# Patient Record
Sex: FEMALE | Race: WHITE | NOT HISPANIC OR LATINO | Employment: OTHER | URBAN - METROPOLITAN AREA
[De-identification: names, ages, dates, MRNs, and addresses within clinical notes are randomized per-mention and may not be internally consistent; named-entity substitution may affect disease eponyms.]

---

## 2023-03-20 ENCOUNTER — DOCUMENTATION (OUTPATIENT)
Dept: PRIMARY CARE | Facility: CLINIC | Age: 83
End: 2023-03-20
Payer: MEDICARE

## 2023-03-22 ENCOUNTER — PATIENT OUTREACH (OUTPATIENT)
Dept: PRIMARY CARE | Facility: CLINIC | Age: 83
End: 2023-03-22
Payer: MEDICARE

## 2023-04-06 ENCOUNTER — OFFICE VISIT (OUTPATIENT)
Dept: PRIMARY CARE | Facility: CLINIC | Age: 83
End: 2023-04-06
Payer: MEDICARE

## 2023-04-06 VITALS
SYSTOLIC BLOOD PRESSURE: 127 MMHG | HEIGHT: 60 IN | DIASTOLIC BLOOD PRESSURE: 55 MMHG | BODY MASS INDEX: 37.3 KG/M2 | TEMPERATURE: 97.4 F | WEIGHT: 190 LBS

## 2023-04-06 DIAGNOSIS — I48.0 PAROXYSMAL ATRIAL FIBRILLATION (MULTI): Primary | ICD-10-CM

## 2023-04-06 DIAGNOSIS — E66.01 OBESITY, MORBID (MULTI): ICD-10-CM

## 2023-04-06 DIAGNOSIS — R45.89 DEPRESSED MOOD: ICD-10-CM

## 2023-04-06 DIAGNOSIS — I49.5 SA NODE DYSFUNCTION (MULTI): ICD-10-CM

## 2023-04-06 DIAGNOSIS — G95.89 LUMBAR EPIDURAL MASS (MULTI): ICD-10-CM

## 2023-04-06 PROBLEM — H35.30 AMD (AGE RELATED MACULAR DEGENERATION): Status: ACTIVE | Noted: 2023-04-06

## 2023-04-06 PROBLEM — M31.6 TEMPORAL ARTERITIS (MULTI): Status: RESOLVED | Noted: 2023-04-06 | Resolved: 2023-04-06

## 2023-04-06 PROBLEM — I10 HYPERTENSION: Status: ACTIVE | Noted: 2023-04-06

## 2023-04-06 PROBLEM — R92.8 ABNORMAL FINDING ON MAMMOGRAPHY: Status: ACTIVE | Noted: 2023-04-06

## 2023-04-06 PROBLEM — M31.6 TEMPORAL ARTERITIS (MULTI): Status: ACTIVE | Noted: 2023-04-06

## 2023-04-06 PROBLEM — G25.81 RESTLESS LEGS SYNDROME: Status: ACTIVE | Noted: 2023-04-06

## 2023-04-06 PROBLEM — I65.23 ASYMPTOMATIC STENOSIS OF BOTH CAROTID ARTERIES WITHOUT INFARCTION: Status: ACTIVE | Noted: 2023-04-06

## 2023-04-06 PROBLEM — H52.02 HYPERMETROPIA OF LEFT EYE: Status: ACTIVE | Noted: 2023-04-06

## 2023-04-06 PROBLEM — H52.223 MYOPIA OF BOTH EYES WITH REGULAR ASTIGMATISM: Status: ACTIVE | Noted: 2023-04-06

## 2023-04-06 PROBLEM — Z95.0 S/P PLACEMENT OF CARDIAC PACEMAKER: Status: ACTIVE | Noted: 2023-04-06

## 2023-04-06 PROBLEM — N95.2 VAGINAL ATROPHY: Status: ACTIVE | Noted: 2023-04-06

## 2023-04-06 PROBLEM — R01.1 SYSTOLIC MURMUR: Status: ACTIVE | Noted: 2023-04-06

## 2023-04-06 PROBLEM — E78.5 HYPERLIPIDEMIA: Status: ACTIVE | Noted: 2023-04-06

## 2023-04-06 PROBLEM — M54.9 BACK PAIN, CHRONIC: Status: ACTIVE | Noted: 2023-04-06

## 2023-04-06 PROBLEM — G47.33 OBSTRUCTIVE SLEEP APNEA: Status: ACTIVE | Noted: 2023-04-06

## 2023-04-06 PROBLEM — M54.16 LUMBAR RADICULOPATHY: Status: ACTIVE | Noted: 2023-04-06

## 2023-04-06 PROBLEM — E11.22 CHRONIC KIDNEY DISEASE DUE TO DIABETES MELLITUS (MULTI): Status: ACTIVE | Noted: 2023-04-06

## 2023-04-06 PROBLEM — R09.89 LEFT CAROTID BRUIT: Status: ACTIVE | Noted: 2023-04-06

## 2023-04-06 PROBLEM — I44.2 THIRD DEGREE AV BLOCK (MULTI): Status: ACTIVE | Noted: 2023-04-06

## 2023-04-06 PROBLEM — E55.9 VITAMIN D DEFICIENCY: Status: ACTIVE | Noted: 2023-04-06

## 2023-04-06 PROBLEM — M25.559 HIP PAIN: Status: ACTIVE | Noted: 2023-04-06

## 2023-04-06 PROBLEM — Z96.1 BILATERAL PSEUDOPHAKIA: Status: ACTIVE | Noted: 2023-04-06

## 2023-04-06 PROBLEM — Z95.0 PRESENCE OF CARDIAC PACEMAKER: Status: ACTIVE | Noted: 2023-04-06

## 2023-04-06 PROBLEM — M17.0 ARTHRITIS OF BOTH KNEES: Status: ACTIVE | Noted: 2023-04-06

## 2023-04-06 PROBLEM — I27.20 PULMONARY HYPERTENSION (MULTI): Status: ACTIVE | Noted: 2023-04-06

## 2023-04-06 PROBLEM — M35.3 POLYMYALGIA RHEUMATICA (MULTI): Status: RESOLVED | Noted: 2023-04-06 | Resolved: 2023-04-06

## 2023-04-06 PROBLEM — E11.9 NON-INSULIN DEPENDENT TYPE 2 DIABETES MELLITUS (MULTI): Status: ACTIVE | Noted: 2023-04-06

## 2023-04-06 PROBLEM — M72.0 DUPUYTREN'S CONTRACTURE: Status: ACTIVE | Noted: 2023-04-06

## 2023-04-06 PROBLEM — R20.0 NUMBNESS IN FEET: Status: ACTIVE | Noted: 2023-04-06

## 2023-04-06 PROBLEM — E11.59 TYPE 2 DIABETES MELLITUS WITH OTHER CIRCULATORY COMPLICATIONS (MULTI): Status: ACTIVE | Noted: 2023-04-06

## 2023-04-06 PROBLEM — M35.3 POLYMYALGIA RHEUMATICA (MULTI): Status: ACTIVE | Noted: 2023-04-06

## 2023-04-06 PROBLEM — H52.13 MYOPIA OF BOTH EYES WITH REGULAR ASTIGMATISM: Status: ACTIVE | Noted: 2023-04-06

## 2023-04-06 PROBLEM — R60.0 BILATERAL LOWER EXTREMITY EDEMA: Status: ACTIVE | Noted: 2023-04-06

## 2023-04-06 PROBLEM — M65.341 TRIGGER RING FINGER OF RIGHT HAND: Status: ACTIVE | Noted: 2023-04-06

## 2023-04-06 PROBLEM — M81.0 OSTEOPOROSIS: Status: ACTIVE | Noted: 2023-04-06

## 2023-04-06 PROBLEM — J45.30 ASTHMA, MILD PERSISTENT (HHS-HCC): Status: ACTIVE | Noted: 2023-04-06

## 2023-04-06 PROBLEM — M65.30 TRIGGER FINGER: Status: ACTIVE | Noted: 2023-04-06

## 2023-04-06 PROBLEM — H43.813 PVD (POSTERIOR VITREOUS DETACHMENT), BOTH EYES: Status: ACTIVE | Noted: 2023-04-06

## 2023-04-06 PROBLEM — H26.492 PCO (POSTERIOR CAPSULAR OPACIFICATION), LEFT: Status: ACTIVE | Noted: 2023-04-06

## 2023-04-06 PROBLEM — R73.9 HYPERGLYCEMIA: Status: ACTIVE | Noted: 2023-04-06

## 2023-04-06 PROBLEM — G89.29 BACK PAIN, CHRONIC: Status: ACTIVE | Noted: 2023-04-06

## 2023-04-06 PROBLEM — H10.10 ALLERGIC CONJUNCTIVITIS: Status: ACTIVE | Noted: 2023-04-06

## 2023-04-06 PROBLEM — I49.9 CARDIAC ARRHYTHMIA: Status: ACTIVE | Noted: 2023-04-06

## 2023-04-06 PROBLEM — H43.819 POSTERIOR VITREOUS DETACHMENT: Status: ACTIVE | Noted: 2023-04-06

## 2023-04-06 PROBLEM — K21.9 GERD (GASTROESOPHAGEAL REFLUX DISEASE): Status: ACTIVE | Noted: 2023-04-06

## 2023-04-06 PROBLEM — I50.32 CHRONIC DIASTOLIC CONGESTIVE HEART FAILURE (MULTI): Status: ACTIVE | Noted: 2023-04-06

## 2023-04-06 PROBLEM — M25.561 BILATERAL KNEE PAIN: Status: ACTIVE | Noted: 2023-04-06

## 2023-04-06 PROBLEM — H35.54 RPE (RETINAL PIGMENT EPITHELIUM) HYPERTROPHY: Status: ACTIVE | Noted: 2023-04-06

## 2023-04-06 PROBLEM — M25.562 BILATERAL KNEE PAIN: Status: ACTIVE | Noted: 2023-04-06

## 2023-04-06 PROCEDURE — 1036F TOBACCO NON-USER: CPT | Performed by: STUDENT IN AN ORGANIZED HEALTH CARE EDUCATION/TRAINING PROGRAM

## 2023-04-06 PROCEDURE — 3078F DIAST BP <80 MM HG: CPT | Performed by: STUDENT IN AN ORGANIZED HEALTH CARE EDUCATION/TRAINING PROGRAM

## 2023-04-06 PROCEDURE — 1159F MED LIST DOCD IN RCRD: CPT | Performed by: STUDENT IN AN ORGANIZED HEALTH CARE EDUCATION/TRAINING PROGRAM

## 2023-04-06 PROCEDURE — 3074F SYST BP LT 130 MM HG: CPT | Performed by: STUDENT IN AN ORGANIZED HEALTH CARE EDUCATION/TRAINING PROGRAM

## 2023-04-06 PROCEDURE — 1160F RVW MEDS BY RX/DR IN RCRD: CPT | Performed by: STUDENT IN AN ORGANIZED HEALTH CARE EDUCATION/TRAINING PROGRAM

## 2023-04-06 PROCEDURE — 99214 OFFICE O/P EST MOD 30 MIN: CPT | Performed by: STUDENT IN AN ORGANIZED HEALTH CARE EDUCATION/TRAINING PROGRAM

## 2023-04-06 RX ORDER — DILTIAZEM HYDROCHLORIDE 360 MG/1
1 CAPSULE, EXTENDED RELEASE ORAL DAILY
COMMUNITY
Start: 2012-11-10 | End: 2023-07-13 | Stop reason: ALTCHOICE

## 2023-04-06 RX ORDER — HYDROGEN PEROXIDE 3 %
1 SOLUTION, NON-ORAL MISCELLANEOUS DAILY
COMMUNITY

## 2023-04-06 RX ORDER — LOSARTAN POTASSIUM 100 MG/1
1 TABLET ORAL DAILY
COMMUNITY
Start: 2018-04-03 | End: 2024-06-04

## 2023-04-06 RX ORDER — FUROSEMIDE 40 MG/1
1 TABLET ORAL DAILY
COMMUNITY
Start: 2018-04-03

## 2023-04-06 RX ORDER — CLOPIDOGREL BISULFATE 75 MG/1
1 TABLET ORAL DAILY
COMMUNITY
Start: 2016-12-13 | End: 2023-07-13 | Stop reason: ALTCHOICE

## 2023-04-06 RX ORDER — ACETAMINOPHEN 500 MG
1 TABLET ORAL DAILY
COMMUNITY
Start: 2022-03-29

## 2023-04-06 RX ORDER — NEOMYCIN/POLYMYXIN B/PRAMOXINE 3.5-10K-1
CREAM (GRAM) TOPICAL
COMMUNITY
Start: 2022-03-29

## 2023-04-06 RX ORDER — DENOSUMAB 60 MG/ML
INJECTION SUBCUTANEOUS
COMMUNITY
Start: 2021-12-10 | End: 2024-03-18 | Stop reason: SDUPTHER

## 2023-04-06 RX ORDER — FLECAINIDE ACETATE 50 MG/1
1 TABLET ORAL 2 TIMES DAILY
COMMUNITY
Start: 2023-03-21 | End: 2024-04-08 | Stop reason: SDUPTHER

## 2023-04-06 RX ORDER — METOPROLOL TARTRATE 25 MG/1
1 TABLET, FILM COATED ORAL 2 TIMES DAILY
COMMUNITY
Start: 2023-03-18 | End: 2024-04-08 | Stop reason: SDUPTHER

## 2023-04-06 RX ORDER — ROSUVASTATIN CALCIUM 40 MG/1
1 TABLET, COATED ORAL DAILY
COMMUNITY
Start: 2016-09-30 | End: 2023-11-27 | Stop reason: SDUPTHER

## 2023-04-06 ASSESSMENT — PATIENT HEALTH QUESTIONNAIRE - PHQ9
SUM OF ALL RESPONSES TO PHQ9 QUESTIONS 1 AND 2: 0
2. FEELING DOWN, DEPRESSED OR HOPELESS: NOT AT ALL
1. LITTLE INTEREST OR PLEASURE IN DOING THINGS: NOT AT ALL

## 2023-04-06 ASSESSMENT — ENCOUNTER SYMPTOMS
OCCASIONAL FEELINGS OF UNSTEADINESS: 0
DEPRESSION: 0
LOSS OF SENSATION IN FEET: 0

## 2023-04-06 ASSESSMENT — COLUMBIA-SUICIDE SEVERITY RATING SCALE - C-SSRS
2. HAVE YOU ACTUALLY HAD ANY THOUGHTS OF KILLING YOURSELF?: NO
1. IN THE PAST MONTH, HAVE YOU WISHED YOU WERE DEAD OR WISHED YOU COULD GO TO SLEEP AND NOT WAKE UP?: NO
6. HAVE YOU EVER DONE ANYTHING, STARTED TO DO ANYTHING, OR PREPARED TO DO ANYTHING TO END YOUR LIFE?: NO

## 2023-04-06 NOTE — PROGRESS NOTES
"82-year-old female presenting for hospital follow-up.  Admitted March 17, discharged March 18.    Hospital course:    \"DANETTE COHEN is a 82 year old Female with a past medical history of HTN,   GERD, chronic low back pain,T2DM (last HgA1c 6.0%), asthma, CKD stage 3b,   HFpEF, CHB s/p PPM placement, osteoporosis, ALEXANDER on CPAP at home, and   hyperlipidemia.   According to the patient she was called by the device clinic last Monday seen   there were readings on her device that needed to be looked at(episodes of   atrial fibrillation).  Dr. Garcia then called the patient on Wednesday scheduled   an appointment with him for next Tuesday.  Pt c/o weakness for the last week.   Her  was taking her pulse ox and noticed her heart rate going from very   high to very low at times so that combined with the weakness he decided to come   to the emergency room.  Her pacemaker was interrogated in the ER it is a [employer] it showed an AT/AF burden of 2%; no device malfunction was noted per   report and device rep.   Labs obtained on admit notable for elevated , mild troponin elevation of   21, Cr 1.37 and GFR 38. Renal function as expected given patient history of CKD   stage 3. CXR obtained on admit was negative. No medications were administered   to patient while she was in the ED. EKG obtained while in ED showed A-fib with   RVR with occasional paced beats.       New Onset Atrial Fibrillation   - started on heparin gtt.   - evaluated by cardiology who initiated metoprolol BID and transitioned to PO   Eliquis.   - ppm device report shows 2% burden; no device malfunction reported   - patient has scheduled follow up with EP Dr. White on 3/21       She was cleared by cardiology for discharge; The patient is medically stable   for discharge home. \"    Patient feeling well, following with cardiology in the outpatient.  Adjusting well to recent med changes, denies any side effects.    Does admit to some " depressed mood, has been going on long-term.  Several years ago was on Zoloft, wanted to discontinue, feels better off of it.  Has done counseling in the past, did not find it beneficial, does not want to pursue at this time.  Believes exercise would help, and wants to increase her home exercise.  Has good support system.    12 point ROS reviewed and negative other than as stated in HPI    General: Alert, oriented, pleasant, in no acute distress  HEENT:      Head: normocephalic, atraumatic;      eyes: EOMI, no scleral icterus;   CV: Heart with regular rate and rhythm, normal S1/S2, no murmurs  Lungs: CTAB without wheezing, rhonchi or rales; good respiratory effort, no increased work of breathing  Extremities: Trace edema bilaterally no cyanosis  Neuro: Cranial nerves grossly intact; alert and oriented  Psych: Appropriate mood and affect    1.  A-fib 2. SA node dysfunction  -Sinus rhythm on auscultation, feels well, stable  -Continue metoprolol tartrate 25 mg BID, flecainide- 50 mg BID, Eliquis 2.5 mg BID  -Continue to follow with cardiology    3.  Depressed mood  - Patient previously on Zoloft, does not wish to return to medication, does not wish counseling at this time  - Acknowledges the increased exercise would be beneficial    4.  Lumbar epidural mass  - Chart review shows lipomatous mass, being followed by pain management    Follow-up Medicare July as scheduled    Christopher D'Amico, DO

## 2023-04-10 ENCOUNTER — PATIENT OUTREACH (OUTPATIENT)
Dept: PRIMARY CARE | Facility: CLINIC | Age: 83
End: 2023-04-10
Payer: MEDICARE

## 2023-04-10 NOTE — PROGRESS NOTES
Spoke with renea.  States doing good.  Appt went well last week.  No questions or concerns.  Has another PCP appt in a few months.  Seeing cardiology tomorrow.  Does not want another follow up call this month.  Aware to call MD office with questions or concerns.

## 2023-05-02 ENCOUNTER — APPOINTMENT (OUTPATIENT)
Dept: PRIMARY CARE | Facility: CLINIC | Age: 83
End: 2023-05-02
Payer: MEDICARE

## 2023-07-12 PROBLEM — M19.91 LOCALIZED, PRIMARY OSTEOARTHRITIS: Status: ACTIVE | Noted: 2018-06-04

## 2023-07-12 PROBLEM — J45.909 ASTHMA (HHS-HCC): Status: ACTIVE | Noted: 2023-07-12

## 2023-07-12 PROBLEM — R06.09 DYSPNEA ON EXERTION: Status: ACTIVE | Noted: 2023-07-12

## 2023-07-12 RX ORDER — ASPIRIN 81 MG/1
1 TABLET ORAL DAILY
COMMUNITY
Start: 2023-04-11

## 2023-07-12 RX ORDER — DICLOFENAC SODIUM 10 MG/G
GEL TOPICAL
COMMUNITY
Start: 2018-06-04

## 2023-07-12 RX ORDER — GABAPENTIN 300 MG/1
CAPSULE ORAL
COMMUNITY
Start: 2022-10-03 | End: 2023-07-13 | Stop reason: ALTCHOICE

## 2023-07-12 RX ORDER — ALBUTEROL SULFATE 90 UG/1
AEROSOL, METERED RESPIRATORY (INHALATION) 4 TIMES DAILY PRN
COMMUNITY
End: 2023-11-28 | Stop reason: ALTCHOICE

## 2023-07-13 ENCOUNTER — OFFICE VISIT (OUTPATIENT)
Dept: PRIMARY CARE | Facility: CLINIC | Age: 83
End: 2023-07-13
Payer: MEDICARE

## 2023-07-13 VITALS
OXYGEN SATURATION: 96 % | WEIGHT: 193 LBS | HEART RATE: 76 BPM | DIASTOLIC BLOOD PRESSURE: 70 MMHG | BODY MASS INDEX: 37.89 KG/M2 | HEIGHT: 60 IN | SYSTOLIC BLOOD PRESSURE: 144 MMHG

## 2023-07-13 DIAGNOSIS — M72.0 DUPUYTREN'S CONTRACTURE: ICD-10-CM

## 2023-07-13 DIAGNOSIS — M35.3 POLYMYALGIA RHEUMATICA (MULTI): ICD-10-CM

## 2023-07-13 DIAGNOSIS — E11.59 TYPE 2 DIABETES MELLITUS WITH OTHER CIRCULATORY COMPLICATIONS (MULTI): Primary | ICD-10-CM

## 2023-07-13 DIAGNOSIS — Z00.00 MEDICARE ANNUAL WELLNESS VISIT, SUBSEQUENT: ICD-10-CM

## 2023-07-13 DIAGNOSIS — M81.0 OSTEOPOROSIS, UNSPECIFIED OSTEOPOROSIS TYPE, UNSPECIFIED PATHOLOGICAL FRACTURE PRESENCE: ICD-10-CM

## 2023-07-13 DIAGNOSIS — Z00.00 WELLNESS EXAMINATION: ICD-10-CM

## 2023-07-13 DIAGNOSIS — M54.9 CHRONIC BACK PAIN, UNSPECIFIED BACK LOCATION, UNSPECIFIED BACK PAIN LATERALITY: ICD-10-CM

## 2023-07-13 DIAGNOSIS — E78.5 HYPERLIPIDEMIA, UNSPECIFIED HYPERLIPIDEMIA TYPE: ICD-10-CM

## 2023-07-13 DIAGNOSIS — Z78.0 ASYMPTOMATIC POSTMENOPAUSAL STATE: ICD-10-CM

## 2023-07-13 DIAGNOSIS — I48.0 PAROXYSMAL ATRIAL FIBRILLATION (MULTI): ICD-10-CM

## 2023-07-13 DIAGNOSIS — J45.909 ASTHMA, UNSPECIFIED ASTHMA SEVERITY, UNSPECIFIED WHETHER COMPLICATED, UNSPECIFIED WHETHER PERSISTENT (HHS-HCC): ICD-10-CM

## 2023-07-13 DIAGNOSIS — I50.32 CHRONIC DIASTOLIC CONGESTIVE HEART FAILURE (MULTI): ICD-10-CM

## 2023-07-13 DIAGNOSIS — G89.29 CHRONIC BACK PAIN, UNSPECIFIED BACK LOCATION, UNSPECIFIED BACK PAIN LATERALITY: ICD-10-CM

## 2023-07-13 DIAGNOSIS — E55.9 VITAMIN D DEFICIENCY: ICD-10-CM

## 2023-07-13 DIAGNOSIS — E11.22 CHRONIC KIDNEY DISEASE DUE TO DIABETES MELLITUS (MULTI): ICD-10-CM

## 2023-07-13 DIAGNOSIS — I49.5 SINOATRIAL NODE DYSFUNCTION (MULTI): ICD-10-CM

## 2023-07-13 PROBLEM — M48.062 LUMBAR STENOSIS WITH NEUROGENIC CLAUDICATION: Status: ACTIVE | Noted: 2023-07-13

## 2023-07-13 PROBLEM — M31.6 TEMPORAL ARTERITIS (MULTI): Status: ACTIVE | Noted: 2023-07-13

## 2023-07-13 PROCEDURE — 1126F AMNT PAIN NOTED NONE PRSNT: CPT | Performed by: STUDENT IN AN ORGANIZED HEALTH CARE EDUCATION/TRAINING PROGRAM

## 2023-07-13 PROCEDURE — 99397 PER PM REEVAL EST PAT 65+ YR: CPT | Performed by: STUDENT IN AN ORGANIZED HEALTH CARE EDUCATION/TRAINING PROGRAM

## 2023-07-13 PROCEDURE — 1170F FXNL STATUS ASSESSED: CPT | Performed by: STUDENT IN AN ORGANIZED HEALTH CARE EDUCATION/TRAINING PROGRAM

## 2023-07-13 PROCEDURE — 99214 OFFICE O/P EST MOD 30 MIN: CPT | Performed by: STUDENT IN AN ORGANIZED HEALTH CARE EDUCATION/TRAINING PROGRAM

## 2023-07-13 PROCEDURE — G0439 PPPS, SUBSEQ VISIT: HCPCS | Performed by: STUDENT IN AN ORGANIZED HEALTH CARE EDUCATION/TRAINING PROGRAM

## 2023-07-13 PROCEDURE — 1036F TOBACCO NON-USER: CPT | Performed by: STUDENT IN AN ORGANIZED HEALTH CARE EDUCATION/TRAINING PROGRAM

## 2023-07-13 PROCEDURE — 3077F SYST BP >= 140 MM HG: CPT | Performed by: STUDENT IN AN ORGANIZED HEALTH CARE EDUCATION/TRAINING PROGRAM

## 2023-07-13 PROCEDURE — 1160F RVW MEDS BY RX/DR IN RCRD: CPT | Performed by: STUDENT IN AN ORGANIZED HEALTH CARE EDUCATION/TRAINING PROGRAM

## 2023-07-13 PROCEDURE — 3078F DIAST BP <80 MM HG: CPT | Performed by: STUDENT IN AN ORGANIZED HEALTH CARE EDUCATION/TRAINING PROGRAM

## 2023-07-13 PROCEDURE — 1159F MED LIST DOCD IN RCRD: CPT | Performed by: STUDENT IN AN ORGANIZED HEALTH CARE EDUCATION/TRAINING PROGRAM

## 2023-07-13 ASSESSMENT — PATIENT HEALTH QUESTIONNAIRE - PHQ9
SUM OF ALL RESPONSES TO PHQ9 QUESTIONS 1 AND 2: 0
1. LITTLE INTEREST OR PLEASURE IN DOING THINGS: NOT AT ALL
2. FEELING DOWN, DEPRESSED OR HOPELESS: NOT AT ALL

## 2023-07-13 ASSESSMENT — ENCOUNTER SYMPTOMS
LOSS OF SENSATION IN FEET: 0
DEPRESSION: 0
OCCASIONAL FEELINGS OF UNSTEADINESS: 0

## 2023-07-13 ASSESSMENT — ACTIVITIES OF DAILY LIVING (ADL)
DRESSING: INDEPENDENT
BATHING: INDEPENDENT
MANAGING_FINANCES: INDEPENDENT
GROCERY_SHOPPING: INDEPENDENT
BATHING: INDEPENDENT
GROCERY_SHOPPING: INDEPENDENT
TAKING_MEDICATION: INDEPENDENT
DRESSING: INDEPENDENT
MANAGING_FINANCES: INDEPENDENT
DOING_HOUSEWORK: INDEPENDENT
DOING_HOUSEWORK: INDEPENDENT
TAKING_MEDICATION: INDEPENDENT

## 2023-07-13 NOTE — PROGRESS NOTES
Subjective   Reason for Visit: Marcelle Hewitt is an 82 y.o. female here for a Medicare Wellness visit.          Reviewed all medications by prescribing practitioner or clinical pharmacist (such as prescriptions, OTCs, herbal therapies and supplements) and documented in the medical record.    HPI    Patient Care Team:  Christopher D'Amico, DO as PCP - General  Christopher D'Amico, DO as PCP - [employer] PCP     Review of Systems    Objective   Vitals:  /70   Pulse 76   Ht 1.524 m (5')   Wt 87.5 kg (193 lb)   SpO2 96%   BMI 37.69 kg/m²       Physical Exam    Assessment/Plan   Problem List Items Addressed This Visit    None           HPI: 82-year-old female presenting for follow-up on multiple chronic conditions, Medicare annual wellness exam/CPE.    Chronic back pain  Stable, following with pain management.  Has upcoming epidural    A-fib, SA node dysfunction, CHF, HLD  Stable, tolerating current regimen well.  Asymptomatic.    DMII, CKD 3B  Stable on SGLT2 only.  A1c generally at goal.  Does not see podiatry.  Is up-to-date on eye exam.     Asthma  Stable without any medication    Osteoporosis  Stable, on Prolia every 6 months.  Due for DEXA.    12 point ROS reviewed and negative other than as stated in HPI      General: Alert, oriented, pleasant, in no acute distress  HEENT:      Head: normocephalic, atraumatic;      eyes: EOMI, no scleral icterus;   Neck: soft, supple, non-tender, no masses appreciated  CV: Heart with regular rate and rhythm, normal S1/S2, no murmurs  Lungs: CTAB without wheezing, rhonchi or rales; good respiratory effort, no increased work of breathing  Abdomen: soft, non-tender, non-distended, no masses appreciated  Extremities: Trace edema, no cyanosis  Neuro: Cranial nerves grossly intact; alert and oriented  Psych: Appropriate mood and affect    1. HM  -CBC, CMP, Lipid panel, Vit D, TSH with reflex T4  -Vaccines:       Flu: Out of season      Shingrix:  Recommended, advised to go to local pharmacy      Pneumococcal: UTD      Tdap: Recommended, advised to go to local pharmacy  -Manjeet w/ roney: no longer screening  -Colonoscopy: no longer screening  -Osteoporosis screening: DEXA ordered    2. Chronic back pain  -Continue to follow with pain management  -Upcoming epidural scheduled    3.  A-fib 4. SA node dysfunction 5.  CHF  -Sinus rhythm on auscultation, feels well, stable  -Continue metoprolol tartrate 25 mg BID, flecainide 50 mg BID, Eliquis 2.5 mg BID  -Furosemide 40 mg as needed, losartan 100 mg, Jardiance 25 mg  -Continue to follow with cardiology    6.  DMII 7.  CKD 3  -A1c consistently at goal, last GFR at 49  -UTD eye exam, diabetic foot exam positive in office, podiatry referral given  -Continue Jardiance 25 mg qd  -Avoid nephrotoxic drugs, and stay adequately hydrated, will refer to nephrology if GFR decreases below thirty    8.  Asthma  -Stable without any medication    9. HLD  -Continue rosuvastatin 40 mg daily     10. Osteoporosis  -Continue Prolia 60 mg every 6 months    F/U 6 months or sooner if indicated    Chris D'Amico, DO

## 2023-07-19 ENCOUNTER — LAB (OUTPATIENT)
Dept: LAB | Facility: LAB | Age: 83
End: 2023-07-19
Payer: MEDICARE

## 2023-07-19 DIAGNOSIS — M54.9 CHRONIC BACK PAIN, UNSPECIFIED BACK LOCATION, UNSPECIFIED BACK PAIN LATERALITY: ICD-10-CM

## 2023-07-19 DIAGNOSIS — I49.5 SINOATRIAL NODE DYSFUNCTION (MULTI): ICD-10-CM

## 2023-07-19 DIAGNOSIS — E11.22 CHRONIC KIDNEY DISEASE DUE TO DIABETES MELLITUS (MULTI): ICD-10-CM

## 2023-07-19 DIAGNOSIS — E11.59 TYPE 2 DIABETES MELLITUS WITH OTHER CIRCULATORY COMPLICATIONS (MULTI): ICD-10-CM

## 2023-07-19 DIAGNOSIS — M81.0 OSTEOPOROSIS, UNSPECIFIED OSTEOPOROSIS TYPE, UNSPECIFIED PATHOLOGICAL FRACTURE PRESENCE: ICD-10-CM

## 2023-07-19 DIAGNOSIS — E55.9 VITAMIN D DEFICIENCY: ICD-10-CM

## 2023-07-19 DIAGNOSIS — Z00.00 WELLNESS EXAMINATION: ICD-10-CM

## 2023-07-19 DIAGNOSIS — M72.0 DUPUYTREN'S CONTRACTURE: ICD-10-CM

## 2023-07-19 DIAGNOSIS — J45.909 ASTHMA, UNSPECIFIED ASTHMA SEVERITY, UNSPECIFIED WHETHER COMPLICATED, UNSPECIFIED WHETHER PERSISTENT (HHS-HCC): ICD-10-CM

## 2023-07-19 DIAGNOSIS — Z00.00 MEDICARE ANNUAL WELLNESS VISIT, SUBSEQUENT: ICD-10-CM

## 2023-07-19 DIAGNOSIS — I48.0 PAROXYSMAL ATRIAL FIBRILLATION (MULTI): ICD-10-CM

## 2023-07-19 DIAGNOSIS — I50.32 CHRONIC DIASTOLIC CONGESTIVE HEART FAILURE (MULTI): ICD-10-CM

## 2023-07-19 DIAGNOSIS — E78.5 HYPERLIPIDEMIA, UNSPECIFIED HYPERLIPIDEMIA TYPE: ICD-10-CM

## 2023-07-19 DIAGNOSIS — G89.29 CHRONIC BACK PAIN, UNSPECIFIED BACK LOCATION, UNSPECIFIED BACK PAIN LATERALITY: ICD-10-CM

## 2023-07-19 DIAGNOSIS — M35.3 POLYMYALGIA RHEUMATICA (MULTI): ICD-10-CM

## 2023-07-19 LAB
ALANINE AMINOTRANSFERASE (SGPT) (U/L) IN SER/PLAS: 13 U/L (ref 7–45)
ALBUMIN (G/DL) IN SER/PLAS: 3.8 G/DL (ref 3.4–5)
ALKALINE PHOSPHATASE (U/L) IN SER/PLAS: 67 U/L (ref 33–136)
ANION GAP IN SER/PLAS: 11 MMOL/L (ref 10–20)
ASPARTATE AMINOTRANSFERASE (SGOT) (U/L) IN SER/PLAS: 16 U/L (ref 9–39)
BILIRUBIN TOTAL (MG/DL) IN SER/PLAS: 0.4 MG/DL (ref 0–1.2)
CALCIDIOL (25 OH VITAMIN D3) (NG/ML) IN SER/PLAS: 33 NG/ML
CALCIUM (MG/DL) IN SER/PLAS: 9.2 MG/DL (ref 8.6–10.6)
CARBON DIOXIDE, TOTAL (MMOL/L) IN SER/PLAS: 28 MMOL/L (ref 21–32)
CHLORIDE (MMOL/L) IN SER/PLAS: 110 MMOL/L (ref 98–107)
CHOLESTEROL (MG/DL) IN SER/PLAS: 137 MG/DL (ref 0–199)
CHOLESTEROL IN HDL (MG/DL) IN SER/PLAS: 45.6 MG/DL
CHOLESTEROL/HDL RATIO: 3
CREATININE (MG/DL) IN SER/PLAS: 0.96 MG/DL (ref 0.5–1.05)
ERYTHROCYTE DISTRIBUTION WIDTH (RATIO) BY AUTOMATED COUNT: 15.9 % (ref 11.5–14.5)
ERYTHROCYTE MEAN CORPUSCULAR HEMOGLOBIN CONCENTRATION (G/DL) BY AUTOMATED: 32.6 G/DL (ref 32–36)
ERYTHROCYTE MEAN CORPUSCULAR VOLUME (FL) BY AUTOMATED COUNT: 91 FL (ref 80–100)
ERYTHROCYTES (10*6/UL) IN BLOOD BY AUTOMATED COUNT: 4.25 X10E12/L (ref 4–5.2)
ESTIMATED AVERAGE GLUCOSE FOR HBA1C: 128 MG/DL
GFR FEMALE: 58 ML/MIN/1.73M2
GLUCOSE (MG/DL) IN SER/PLAS: 99 MG/DL (ref 74–99)
HEMATOCRIT (%) IN BLOOD BY AUTOMATED COUNT: 38.7 % (ref 36–46)
HEMOGLOBIN (G/DL) IN BLOOD: 12.6 G/DL (ref 12–16)
HEMOGLOBIN A1C/HEMOGLOBIN TOTAL IN BLOOD: 6.1 %
LDL: 62 MG/DL (ref 0–99)
LEUKOCYTES (10*3/UL) IN BLOOD BY AUTOMATED COUNT: 11.1 X10E9/L (ref 4.4–11.3)
NRBC (PER 100 WBCS) BY AUTOMATED COUNT: 0 /100 WBC (ref 0–0)
PLATELETS (10*3/UL) IN BLOOD AUTOMATED COUNT: 222 X10E9/L (ref 150–450)
POTASSIUM (MMOL/L) IN SER/PLAS: 4.4 MMOL/L (ref 3.5–5.3)
PROTEIN TOTAL: 6.2 G/DL (ref 6.4–8.2)
SODIUM (MMOL/L) IN SER/PLAS: 145 MMOL/L (ref 136–145)
THYROTROPIN (MIU/L) IN SER/PLAS BY DETECTION LIMIT <= 0.05 MIU/L: 2.43 MIU/L (ref 0.44–3.98)
TRIGLYCERIDE (MG/DL) IN SER/PLAS: 148 MG/DL (ref 0–149)
UREA NITROGEN (MG/DL) IN SER/PLAS: 19 MG/DL (ref 6–23)
VLDL: 30 MG/DL (ref 0–40)

## 2023-07-19 PROCEDURE — 84443 ASSAY THYROID STIM HORMONE: CPT

## 2023-07-19 PROCEDURE — 85027 COMPLETE CBC AUTOMATED: CPT

## 2023-07-19 PROCEDURE — 80061 LIPID PANEL: CPT

## 2023-07-19 PROCEDURE — 83036 HEMOGLOBIN GLYCOSYLATED A1C: CPT

## 2023-07-19 PROCEDURE — 80053 COMPREHEN METABOLIC PANEL: CPT

## 2023-07-19 PROCEDURE — 36415 COLL VENOUS BLD VENIPUNCTURE: CPT

## 2023-07-19 PROCEDURE — 82306 VITAMIN D 25 HYDROXY: CPT

## 2023-07-19 NOTE — LETTER
July 25, 2023     Marcelle Hewitt  [address] [facility] [id number]      Dear Ms. Hewitt:    Below are the results from your recent visit:  CKD stable, kidney function has improved over the past year.  Continue to avoid nephrotoxic medications.     Hemoglobin A1c at 6.1, at goal.     Remaining labs unremarkable.

## 2023-07-20 NOTE — RESULT ENCOUNTER NOTE
CKD stable, kidney function has improved over the past year.  Continue to avoid nephrotoxic medications.    Hemoglobin A1c at 6.1, at goal.    Remaining labs unremarkable.

## 2023-07-24 ENCOUNTER — DOCUMENTATION (OUTPATIENT)
Dept: PRIMARY CARE | Facility: CLINIC | Age: 83
End: 2023-07-24
Payer: MEDICARE

## 2023-07-25 ENCOUNTER — HOSPITAL ENCOUNTER (OUTPATIENT)
Dept: DATA CONVERSION | Facility: HOSPITAL | Age: 83
End: 2023-07-25
Attending: ANESTHESIOLOGY
Payer: MEDICARE

## 2023-07-25 DIAGNOSIS — M48.062 SPINAL STENOSIS, LUMBAR REGION WITH NEUROGENIC CLAUDICATION: ICD-10-CM

## 2023-07-25 DIAGNOSIS — M54.16 RADICULOPATHY, LUMBAR REGION: ICD-10-CM

## 2023-07-27 ENCOUNTER — TELEPHONE (OUTPATIENT)
Dept: PRIMARY CARE | Facility: CLINIC | Age: 83
End: 2023-07-27
Payer: MEDICARE

## 2023-07-27 DIAGNOSIS — G89.29 CHRONIC PAIN OF BOTH KNEES: ICD-10-CM

## 2023-07-27 DIAGNOSIS — G89.29 CHRONIC BACK PAIN, UNSPECIFIED BACK LOCATION, UNSPECIFIED BACK PAIN LATERALITY: Primary | ICD-10-CM

## 2023-07-27 DIAGNOSIS — M54.9 CHRONIC BACK PAIN, UNSPECIFIED BACK LOCATION, UNSPECIFIED BACK PAIN LATERALITY: Primary | ICD-10-CM

## 2023-07-27 DIAGNOSIS — M25.562 CHRONIC PAIN OF BOTH KNEES: ICD-10-CM

## 2023-07-27 DIAGNOSIS — M25.561 CHRONIC PAIN OF BOTH KNEES: ICD-10-CM

## 2023-09-03 PROBLEM — E66.01 CLASS 2 SEVERE OBESITY WITH SERIOUS COMORBIDITY AND BODY MASS INDEX (BMI) OF 37.0 TO 37.9 IN ADULT (MULTI): Status: ACTIVE | Noted: 2023-09-03

## 2023-09-03 PROBLEM — E66.812 CLASS 2 SEVERE OBESITY WITH SERIOUS COMORBIDITY AND BODY MASS INDEX (BMI) OF 37.0 TO 37.9 IN ADULT: Status: ACTIVE | Noted: 2023-09-03

## 2023-09-19 ENCOUNTER — APPOINTMENT (OUTPATIENT)
Dept: PRIMARY CARE | Facility: CLINIC | Age: 83
End: 2023-09-19
Payer: MEDICARE

## 2023-10-02 NOTE — OP NOTE
Post Operative Note:     PreOp Diagnosis: Lumbar spinal stenosis with neurogenic  claudication   Post-Procedure Diagnosis: Lumbar spinal stenosis  with neurogenic claudication   Procedure: 1.  Right L3 and right L4 transforaminal  epidural steroid injection using fluoroscopic guidance   Surgeon: Kaylee Byrnes MD, PhD   Resident/Fellow/Other Assistant: Red Briones MD   Estimated Blood Loss (mL): none   Specimen: no   Findings: See Operative Note     Operative Report Dictated:  Dictation: not applicable - note contains Operative  Report   Operative Report:    Ms. Hewitt is an 82-year-old lady with history of mild to moderate lumbar spinal stenosis presenting for right L3 and right L4 transforaminal epidural steroid  injection.  The patient has been denied by her insurance carrier to have the Vertiflex interspinous spacer procedure.  He has discontinued her Eliquis 4-1/2 days ago and understands to resume it tomorrow.    The patient was identified in the preoperative area and informed consent was obtained.  Patient was brought to the procedure room and placed in the prone position.  Using fluoroscopic guidance, the skin and subcutaneous tissue overlying needle trajectories  to the right L3 and L4 foramina were anesthetized with a total of 5 cc of Lidocaine.  22-gauge pencil point needles were then advanced under fluoroscopic guidance into the foramina where the L3 and L4 nerve roots exit on the right.  Needle positions were  confirmed in AP and lateral views.  Injection of iohexol contrast under live fluoroscopy revealed appropriate epidural spread without vascular uptake.  Thereafter, 5 mg dexamethasone injected into each needle tip and the needles removed.  Patient was  then transferred to the recovery room in stable condition and will update us on outpatient basis on the response to the procedure.     Attestation:   Note Completion:  Attending Attestation I was present for the entire procedure          Electronic Signatures:  Kaylee Byrnes)  (Signed 25-Jul-2023 13:59)   Authored: Post Operative Note, Note Completion      Last Updated: 25-Jul-2023 13:59 by Kaylee Byrnes)

## 2023-10-10 ENCOUNTER — APPOINTMENT (OUTPATIENT)
Dept: CARDIOLOGY | Facility: CLINIC | Age: 83
End: 2023-10-10
Payer: MEDICARE

## 2023-10-10 ENCOUNTER — OFFICE VISIT (OUTPATIENT)
Dept: CARDIOLOGY | Facility: CLINIC | Age: 83
End: 2023-10-10
Payer: MEDICARE

## 2023-10-10 ENCOUNTER — OFFICE VISIT (OUTPATIENT)
Dept: NEUROLOGY | Facility: CLINIC | Age: 83
End: 2023-10-10
Payer: MEDICARE

## 2023-10-10 VITALS
HEART RATE: 81 BPM | SYSTOLIC BLOOD PRESSURE: 125 MMHG | BODY MASS INDEX: 37.89 KG/M2 | DIASTOLIC BLOOD PRESSURE: 71 MMHG | WEIGHT: 193 LBS | HEIGHT: 60 IN

## 2023-10-10 VITALS
SYSTOLIC BLOOD PRESSURE: 156 MMHG | HEART RATE: 70 BPM | HEIGHT: 60 IN | DIASTOLIC BLOOD PRESSURE: 72 MMHG | BODY MASS INDEX: 38.21 KG/M2 | WEIGHT: 194.6 LBS

## 2023-10-10 DIAGNOSIS — G25.81 RLS (RESTLESS LEGS SYNDROME): ICD-10-CM

## 2023-10-10 DIAGNOSIS — G44.89 OTHER HEADACHE SYNDROME: Primary | ICD-10-CM

## 2023-10-10 DIAGNOSIS — R20.0 NUMBNESS IN FEET: ICD-10-CM

## 2023-10-10 DIAGNOSIS — Z95.0 PRESENCE OF CARDIAC PACEMAKER: Primary | ICD-10-CM

## 2023-10-10 PROCEDURE — 1126F AMNT PAIN NOTED NONE PRSNT: CPT | Performed by: INTERNAL MEDICINE

## 2023-10-10 PROCEDURE — 3074F SYST BP LT 130 MM HG: CPT | Performed by: PSYCHIATRY & NEUROLOGY

## 2023-10-10 PROCEDURE — 1160F RVW MEDS BY RX/DR IN RCRD: CPT | Performed by: INTERNAL MEDICINE

## 2023-10-10 PROCEDURE — 3078F DIAST BP <80 MM HG: CPT | Performed by: INTERNAL MEDICINE

## 2023-10-10 PROCEDURE — 99214 OFFICE O/P EST MOD 30 MIN: CPT | Performed by: PSYCHIATRY & NEUROLOGY

## 2023-10-10 PROCEDURE — 3078F DIAST BP <80 MM HG: CPT | Performed by: PSYCHIATRY & NEUROLOGY

## 2023-10-10 PROCEDURE — 3077F SYST BP >= 140 MM HG: CPT | Performed by: INTERNAL MEDICINE

## 2023-10-10 PROCEDURE — 1160F RVW MEDS BY RX/DR IN RCRD: CPT | Performed by: PSYCHIATRY & NEUROLOGY

## 2023-10-10 PROCEDURE — 1036F TOBACCO NON-USER: CPT | Performed by: INTERNAL MEDICINE

## 2023-10-10 PROCEDURE — 99214 OFFICE O/P EST MOD 30 MIN: CPT | Performed by: INTERNAL MEDICINE

## 2023-10-10 PROCEDURE — 1036F TOBACCO NON-USER: CPT | Performed by: PSYCHIATRY & NEUROLOGY

## 2023-10-10 PROCEDURE — 1126F AMNT PAIN NOTED NONE PRSNT: CPT | Performed by: PSYCHIATRY & NEUROLOGY

## 2023-10-10 PROCEDURE — 1159F MED LIST DOCD IN RCRD: CPT | Performed by: INTERNAL MEDICINE

## 2023-10-10 PROCEDURE — 1159F MED LIST DOCD IN RCRD: CPT | Performed by: PSYCHIATRY & NEUROLOGY

## 2023-10-10 NOTE — PROGRESS NOTES
Subjective     Marcelle Hewitt is a 83 y.o. year old female seen in follow-up today for restless legs syndrome, lumbar stenosis, numbness in feet, headache    HPI    83-year-old right-handed  woman with past medical history significant for hypertension, diabetes, dyslipidemia, coronary disease, pacemaker placement in 2008 for syncope, L1 compression deformity, cataract surgeries.     I have followed her since 2009. She presented initially with back and lower extremity pain including RLS responsive to ropinirole. More recently she complained in early 2014 of neck and shoulder girdle pain and new headaches. Inflammatory markers were elevated. Temporal artery biopsy was negative. She took prednisone for a diagnosis of polymyalgia rheumatica and ultimately came off prednisone in mid 2016. She was found in November 2016 on carotid ultrasound to have 50-69% bilateral ICA stenosis, but a subsequent carotid ultrasound in November 2017 found less than 50% bilateral ICA stenosis.      I evaluated her by audiovisual visit on 2/1/2021. At that time she reported headaches and I sent her for labs which showed normal ESR and CRP.     I evaluated her again on 6/10/2021, in the office. She reported resolution of headaches at that time. She continued to be bothered by low back and bilateral lower extremity pain including a possible component of RLS. I suggested turmeric 450 mg at bedtime.     I evaluated him again on 2/15/2022, in the office. She indicated an exacerbation of headaches for 2-3 weeks fairly continuously around November/December 2021, and around the time did test positive for COVID-19 with low-grade fevers and some somnolence as the manifestation. Her headaches subsequently resolved and had not returned at the point she saw me last. He did indicate ongoing low back and bilateral lower extremity pain.    I evaluated her most recently on 10/11/2022 in the office.  She appeared neurologically stable at that  visit with no significant recurrence of headaches.  She was seeing pain management regarding chronic low back pain.  She was not significantly bothered by RLS symptoms.    She is evaluated again today in the office.    She has had no recent headaches of note.  A couple of months ago on a single occasion she noted awakening in the middle of the night with headache but the pain was only moderate and the headache was relatively short-lived.    She notes RLS symptoms occasionally but disruption of sleep is only very infrequent.  She is not taking gabapentin or other specific agent for this condition.    Her main complaint today is numbness in the feet.  She indicates that she has noted this to some extent for about the past 5 years and subjectively it has been a static complaint.  It involves both the soles and dorsal aspects of the feet.  However, she was additionally concerned when she saw her PCP in July for a wellness check and apparently had difficulty perceiving a light touch stimulus over the feet.  She was referred to podiatry but her first appointment with them is not until 11/6.    She endorses occasional pain in the calves which tends to occur in the context of weather changes, but denies painful feet.    She is on Jardiance but it indicates that is primarily for her heart rather than for diabetes.  She is not on insulin.    With regard to vision she indicates a diagnosis of dry macular degeneration OS, for which she is on AREDS.    Regard to gait/balance she does not routinely use a cane or walker but indicates that she is very careful.  She denies recent falls.  She does occasionally note transient instability on arising from a chair after prolonged sitting, so typically stands still for at least 5-10 seconds before she starts walking.    She has ongoing low back pain but it is significantly improved after epidural injections.  The injections did not improve the numbness in her distal lower  extremities.    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 (CMS/[facility])    Chronic diastolic congestive heart failure (CMS/[facility])    Cardiac arrhythmia    Left carotid bruit    Hypertension    Hypermetropia of left eye    Hyperlipidemia    Hyperglycemia    Hip pain    GERD (gastroesophageal reflux disease)    Lumbar radiculopathy    Numbness in feet    Myopia of both eyes with regular astigmatism    Obstructive sleep apnea    Osteoporosis    PCO (posterior capsular opacification), left    Presence of cardiac pacemaker    Pulmonary hypertension (CMS/[facility])    Restless legs syndrome    RPE (retinal pigment epithelium) hypertrophy    Posterior vitreous detachment    PVD (posterior vitreous detachment), both eyes    Sinoatrial node dysfunction (CMS/[facility])    Systolic murmur    Third degree AV block (CMS/[facility])    Vitamin D deficiency    Non-insulin dependent type 2 diabetes mellitus (CMS/[facility])    Type 2 diabetes mellitus with other circulatory complications (CMS/[facility])    S/P placement of cardiac pacemaker    Vaginal atrophy    Arthritis of both knees    Lumbar epidural mass (CMS/[facility])    Obesity, morbid (CMS/[facility])    Asthma    Dyspnea on exertion    Localized, primary osteoarthritis    Paroxysmal atrial fibrillation (CMS/[facility])    Lumbar stenosis with neurogenic claudication    Temporal arteritis (CMS/[facility])    Polymyalgia rheumatica (CMS/[facility])    BMI 37.0-37.9, adult    Class 2 severe obesity with serious comorbidity and body mass index (BMI) of 37.0  to 37.9 in adult (CMS/[facility])     Past Medical History:   Diagnosis Date    Abnormal finding of blood chemistry, unspecified 07/08/2022    Abnormal blood chemistry    Anesthesia of skin 11/12/2020    Numbness of foot    Bilateral primary osteoarthritis of hip 03/01/2021    Osteoarthritis, hip, bilateral    Dermatochalasis of unspecified eye, unspecified eyelid 05/06/2019    Dermatochalasis    Dry eye syndrome of bilateral lacrimal glands 03/10/2022    Dry eyes    Dry eye syndrome of unspecified lacrimal gland 04/29/2016    Dry eye syndrome    Encounter for prophylactic measures, unspecified 11/12/2020    Need for prophylactic measure    Encounter for screening mammogram for malignant neoplasm of breast     Visit for screening mammogram    Headache, unspecified 07/02/2021    Morning headache    History of falling 07/12/2017    History of fall    Keratoconjunctivitis sicca, not specified as Sjogren's, bilateral 03/10/2022    Keratoconjunctivitis sicca of both eyes not due to Sjogren's syndrome    Meibomian gland dysfunction left eye, upper and lower eyelids 03/10/2022    Meibomian gland dysfunction (MGD) of upper and lower eyelid of left eye    Meibomian gland dysfunction right eye, upper and lower eyelids 03/10/2022    Meibomian gland dysfunction (MGD) of upper and lower eyelid of right eye    Menopausal and female climacteric states 04/29/2016    Postmenopausal disorder    Morbid (severe) obesity due to excess calories (CMS/[facility]) 07/08/2022    Class 2 severe obesity with serious comorbidity in adult    Myopia, bilateral 03/10/2022    Myopia with presbyopia of both eyes    Myopia, right eye 03/10/2022    Myopia of right eye    Nonexudative age-related macular degeneration, bilateral, early dry stage 05/06/2019    Early dry stage nonexudative age-related macular degeneration of both eyes    Obesity, unspecified 12/02/2020    Obesity (BMI 30-39.9)    Obstructive sleep apnea (adult) (pediatric) 09/21/2017    ALEXANDER on CPAP     Other abnormalities of breathing 12/20/2021    Difficulty breathing    Other conditions influencing health status     DEXA Body Composition Study    Other conditions influencing health status     History of pregnancy    Other fracture of upper and lower end of left fibula, subsequent encounter for closed fracture with routine healing 06/04/2019    Closed fracture of distal end of left fibula with routine healing, unspecified fracture morphology, subsequent encounter    Other headache syndrome 07/13/2016    Other headache syndrome    Other secondary cataract, unspecified eye     PCO (posterior capsular opacification)    Other specified abnormal findings of blood chemistry 04/08/2020    Elevated serum creatinine    Other symptoms and signs involving the musculoskeletal system 09/08/2021    Weakness of both lower extremities    Other vitreous opacities, unspecified eye     Floaters    Pain in leg, unspecified 11/13/2019    Leg pain    Pain in unspecified limb 09/14/2017    Limb pain    Personal history of diseases of the skin and subcutaneous tissue 04/11/2019    History of cellulitis    Personal history of other diseases of the musculoskeletal system and connective tissue 07/25/2017    History of neck pain    Personal history of other diseases of the musculoskeletal system and connective tissue 07/12/2017    History of muscle spasm    Personal history of other diseases of the musculoskeletal system and connective tissue 06/27/2019    History of osteoporosis    Personal history of other diseases of the musculoskeletal system and connective tissue 03/02/2021    History of spinal stenosis    Personal history of other diseases of the nervous system and sense organs 03/10/2022    History of blepharitis    Personal history of other diseases of the nervous system and sense organs 11/16/2017    History of sciatica    Personal history of other diseases of the nervous system and sense organs 04/17/2019    History of iritis     Personal history of other drug therapy 10/06/2021    History of influenza vaccination    Personal history of other medical treatment 02/22/2021    History of screening mammography    Personal history of other specified conditions 10/06/2021    History of excessive sweating    Personal history of other specified conditions 02/15/2022    History of headache    Personal history of other specified conditions 12/08/2020    History of shortness of breath    Personal history of other specified conditions 12/19/2017    History of urinary frequency    Personal history of other specified conditions 04/03/2018    History of edema    Personal history of other specified conditions 07/08/2022    History of edema    Personal history of other specified conditions 04/12/2019    History of chronic pain    Personal history of urinary (tract) infections 07/15/2021    History of urinary tract infection    Polyosteoarthritis, unspecified 11/12/2020    Osteoarthritis of multiple joints    Presence of intraocular lens     Presence of artificial intra-ocular lens    Presence of intraocular lens 03/10/2022    Pseudophakia of both eyes    Presence of spectacles and contact lenses     Wears eyeglasses    Shortness of breath 06/30/2022    SOB (shortness of breath) on exertion    Spinal stenosis, lumbar region without neurogenic claudication 02/15/2022    Lumbar canal stenosis    Unilateral primary osteoarthritis, left knee 05/07/2020    Primary osteoarthritis of left knee    Unspecified disorder of refraction 03/10/2022    Refractive error    Unspecified epiphora, unspecified side     Eye tearing    Urinary tract infection, site not specified 12/19/2017    Recurrent UTI     Past Surgical History:   Procedure Laterality Date    CARDIAC PACEMAKER PLACEMENT  09/17/2021    Pacemaker Permanent Placement    CATARACT EXTRACTION  11/15/2013    Cataract Surgery    OTHER SURGICAL HISTORY  06/04/2019    Tonsillectomy     Social History     Tobacco Use     Smoking status: Never    Smokeless tobacco: Never   Substance Use Topics    Alcohol use: Not on file     family history includes Breast cancer in her sister; Diabetes in her sister; Heart disease in her father and mother; Hypertension in her sister; Multiple sclerosis in her sister; Sleep apnea in her father and mother; Strabismus in her child.    Current Outpatient Medications:     albuterol 90 mcg/actuation inhaler, Inhale 4 times a day as needed., Disp: , Rfl:     apixaban (Eliquis) 2.5 mg tablet, Take 1 tablet (2.5 mg) by mouth 2 times a day., Disp: , Rfl:     apixaban (Eliquis) 2.5 mg tablet, TAKE 1 TABLET BY MOUTH TWO TIMES A DAY, Disp: 60 tablet, Rfl: 11    aspirin 81 mg EC tablet, Take 1 tablet (81 mg) by mouth once daily., Disp: , Rfl:     calcium phosphate-vitamin D3 250 mg-12.5 mcg (500 unit) tablet,chewable, TAKE BY MOUTH AS DIRECTED, Disp: , Rfl:     cholecalciferol (Vitamin D-3) 50 mcg (2,000 unit) capsule, Take 1 capsule (50 mcg) by mouth once daily., Disp: , Rfl:     denosumab (Prolia) 60 mg/mL syringe, 60 mg subq every 6 months, Disp: , Rfl:     diclofenac sodium (Voltaren) 1 % gel gel, , Disp: , Rfl:     empagliflozin (Jardiance) 25 mg, Take 1 tablet (25 mg) by mouth once daily., Disp: , Rfl:     empagliflozin (Jardiance) 25 mg, TAKE 1 TABLET BY MOUTH ONCE DAILY IN THE MORNING, Disp: 30 tablet, Rfl: 11    esomeprazole (NexIUM) 20 mg DR capsule, Take 1 capsule (20 mg) by mouth once daily., Disp: , Rfl:     flecainide (Tambocor) 50 mg tablet, Take 1 tablet (50 mg) by mouth 2 times a day., Disp: , Rfl:     furosemide (Lasix) 40 mg tablet, Take 1 tablet (40 mg) by mouth once daily., Disp: , Rfl:     losartan (Cozaar) 100 mg tablet, Take 1 tablet (100 mg) by mouth once daily., Disp: , Rfl:     metoprolol tartrate (Lopressor) 25 mg tablet, Take 1 tablet (25 mg) by mouth 2 times a day., Disp: , Rfl:     rosuvastatin (Crestor) 40 mg tablet, Take 1 tablet (40 mg) by mouth once daily., Disp: , Rfl:    Allergies   Allergen Reactions    Niacin Unknown    Pregabalin Unknown    Sulfa (Sulfonamide Antibiotics) Unknown       Objective   Neurological Exam  Physical Exam    Physical Examination:  General: Alert woman who was ambulatory without assistive devices.    Cardiovascular: Warm feet without discoloration or edema, with strong symmetric dorsalis pedis pulses.  Cranial Nerves:  Funduscopic exam was  not well visualized bilaterally on nondilated exam.  Pupils were equal, round and reactive to light with no relative afferent pupillary defect.  Extraocular movements were intact and conjugate without nystagmus.  No ptosis.  Visual fields were full to confrontation tested binocularly.  Facial sensation was symmetric to pin.  Facial motor function was symmetrically intact.  Hearing was grossly intact.  No dysarthria.  Shoulder shrug was symmetric.  Tongue protrusion was midline.  Motor: Muscle bulk and tone were normal throughout. There was no pronator drift or asymmetry of finger taps. Confrontation strength was symmetrically 5/5 throughout the upper and lower extremities.   Sensation: Pin was symmetrically sharp over the volar fingertips and was also sharp over the toes without stocking gradient.  Vibration thresholds were normal at the index fingers.  Vibration perception was absent bilaterally at the great toes, trace at the left medial malleolus, strong at the right lateral malleolus.  Romberg sign was absent.  Station: Intact and stable.  Gait: Stable and remarkable for a mildly tentative appearance and a short stride length, without apraxia/shuffling.  She needed to touch the wall quite frequently to perform tandem.    Assessment/Plan     She is doing well from the standpoint of headaches, also from the standpoint of restless legs syndrome.    Today's visit was mostly focused on her complaint of numbness in the feet.  I reviewed a differential diagnosis of polyneuropathy versus lumbosacral polyradiculopathy, or  a combination of the two    As she is troubled by this symptom and anxious as to  how best to approach it,  I recommended further evaluation with EMG.    She indicated however that she would first like to keep her upcoming appointment with podiatry in early November before considering the EMG.  I advised her to contact the office if she would like to proceed with it.    We will determine further evaluation and management steps and timing of her next office visit depending on the EMG findings.

## 2023-10-10 NOTE — PATIENT INSTRUCTIONS
I am glad to hear that you are doing well from the standpoint of headache.    We mainly discussed your foot numbness today.  It may be indicative of a peripheral neuropathy, whether related to borderline diabetes or other factors.  However, it may be instead be emanating from your lumbar spine.    The best way to evaluate further is electrodiagnostic testing (EMG).  This will help to determine whether there is a neuropathy to be be evaluated further or whether the numbness in your feet is indeed coming from your lumbar spine, in which case there is not much rationale for further testing unless you are inclined to pursue spine surgery.    I will anticipate seeing you back shortly for the EMG and we will decide on further evaluation and management steps, and timing of your next visit, depending on the EMG findings.

## 2023-10-10 NOTE — PROGRESS NOTES
Subjective   Marcelle Hewitt is a 83 y.o. female.    Chief Complaint:  Dx. Cad, htn, hdl, A-fib    This is an 82 y/o female here today for a six month Cardiology follow up visit. She complains of numbness to feet and generalized weakness. She continues to get sob with exertion. She was seen at [facility] in June 2022 with fatigue, cough, and increased sob. Continues to have 1-2+ lower leg edema. She had a Cardiac cath in 2018, while in [place], which showed normal coronaries. An Echo was done in April, reviewed results- see report. Has a pacemaker which is checked every six months and PRN. Reviewed lab work results and medications. She wears CPAP at night for sleep apnea. She has arthritis to both knees which affects her ability to exercise.         ROS  ASSESSMENT:     1. Documented normal coronary arteries by cardiac cath 11/1994 and 10/2001 and 1/2018. The patient again had cardiac cath in [place] 01/2018. Normal coronary arteries. Echocardiogram was performed on 10/05/2016 showing a preserved LV ejection fraction of 55â€“60 percent with mild hypokinesis of the anteroseptal wall presumably due to pacemaker activity. There was however evidence of moderate pulmonary hypertension with moderate 2+ tricuspid valve regurgitation.  2. Positive family history of CAD.  3. History of rate related left bundle branch block conduction delay.  4. Status post dual-chamber permanent pacemaker insertion 08/14/2008 for cardiogenic syncope with pulse generator replacement 08/12/2019. The patient did have a recent device check on 04/06/2020 showing right atrial pacing 87% right ventricular pacing only 1%. There were no ventricular high rate episodes. There were 3 episodes of either atrial tachycardia or atrial fibrillation all lasting less than 1 minute. The patient did have a more recent pacemaker interrogation on 8/27/2020 done remotely. This demonstrated 100% right atrial pacing with 0% right ventricular pacemaking  activity. The estimated battery life is 12 years. There were no episodes of atrial tachycardia or atrial fibrillation on Cardizem  mg daily.  The patient has had a more recent device interrogation 6/7/2023 estimated battery life 9.5 years.  5 episodes of high atrial rates longest lasting greater than 48 hours on 3/13/2023.  Total burden of atrial fibrillation is 2%.  She is atrial pacing 92% ventricular pacing 1%.  The patient is presently on flecainide 50 mg twice daily Eliquis 2.5 mg twice daily and metoprolol 25 mg twice daily.  5. Hypertension. The patient will remain on the same dose of the Cardizem  mg daily and Losartan 50 mg daily. If blood pressure elevates in the future could increase the dose of losartan.  6. Hyperlipidemia. She is presently on rosuvastatin 40 mg daily and her most recent lab work from 04/01/2020 included a cholesterol of 130 LDL 63 HDL 40 triglyceride 132. The CBC and SMA panels were normal with creatinine of 1.11. The patient did have repeat lab work performed on 11/12/2020 with cholesterol 156 LDL 77 triglyceride 100 HDL 58. The liver function panel was normal. Creatinine was 1.22 and glycohemoglobin 6.4%. Vitamin D level was 30. The CBC was normal.  The patient had recent lab work 7/19/2023 with cholesterol 137 LDL 62 HDL 45 triglyceride 148 which is satisfactory.  The glycohemoglobin was 6.1% CBC and electrolyte panel is normal.  7. History of cardiogenic syncope.  8. GERD.  9. Bronchospastic airway disease.  10.Osteoporosis.  11.History of atypical pneumonia.  12.Remote left-sided cataract extraction.  13.Borderline type 2 diabetes. The patient's most recent glycohemoglobin was 6. 5% on 04/01/2020 and also 6.4% when checked on 11/2/2020.  Patient complaining of some numbness in the feet scheduled for EMG.  14. PVD: Patient apparently saw PCP after visit from [place] with bilateral pedal edema and redness. Was switched from losartan-hydrochlorothiazide to furosemide 40 mg  daily and given antibiotic. Remain on furosemide 40 mg daily. The patient did have lower extremity ultrasound on 04/03/2018 negative for DVT.  15. Obstructive sleep apnea: On CPAP.   16. Low-grade carotid vascular disease. Carotid US done 11/2017 showed < 50% stenosis bilaterally with possible left subclavian stenosis. The patient is on Plavix 75 mg daily as per neurology.  17. OA. Left knee moderate OA. Follows with Dr Wing. Recent x-rays from 05/07/2020 demonstrated bilateral advanced medial compartmental DJD.  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 The patient is had 2 episodes of stumbling and falling over the past several weeks. On one occasion she tripped over a curb and another time on uneven pavement. A head CT on 11/22/2019 was unremarkable. A CT scan of the lumbosacral spine on 11/20/2019 showed multilevel DJD most prominent at L5-S1. the patient is followed by neurology.  Patient has received epidural injections to the lumbosacral spine for her chronic low back pain.  She has some numbness in her feet and will be scheduled for EMG.  19. Vitamin D deficiency. The patient had a vitamin D level of only 26 on 10/04/2019 as been placed on supplementation.  20. Obstructive sleep apnea. Continue follow-up with pulmonology and the use of a BiPAP mask.     Objective   Cardiovascular:      Comments: This is an 84 y/o female here today for a cardiology follow up visit  Cardiovascular Rate and Rhythm regular with S1 and S2 with 2/6 murmur  Pulmonary lungs clear bilaterally to auscultation posterior  Neck supple left Carotid bruit no JVP good carotid upstroke  Abdomen soft nontender bowel sounds present in all four quadrants  Extremities with 1+ lower leg edema pedal pulse present bilaterally             Lab Review:   Lab on 07/19/2023   Component Date Value    WBC 07/19/2023 11.1     nRBC 07/19/2023 0.0     RBC 07/19/2023 4.25     Hemoglobin 07/19/2023 12.6     Hematocrit 07/19/2023 38.7     MCV 07/19/2023 91     MCHC  07/19/2023 32.6     Platelets 07/19/2023 222     RDW 07/19/2023 15.9 (H)     Cholesterol 07/19/2023 137     HDL 07/19/2023 45.6     Cholesterol/HDL Ratio 07/19/2023 3.0     LDL 07/19/2023 62     VLDL 07/19/2023 30     Triglycerides 07/19/2023 148     Glucose 07/19/2023 99     Sodium 07/19/2023 145     Potassium 07/19/2023 4.4     Chloride 07/19/2023 110 (H)     Bicarbonate 07/19/2023 28     Anion Gap 07/19/2023 11     Urea Nitrogen 07/19/2023 19     Creatinine 07/19/2023 0.96     GFR Female 07/19/2023 58 (A)     Calcium 07/19/2023 9.2     Albumin 07/19/2023 3.8     Alkaline Phosphatase 07/19/2023 67     Total Protein 07/19/2023 6.2 (L)     AST 07/19/2023 16     Total Bilirubin 07/19/2023 0.4     ALT (SGPT) 07/19/2023 13     TSH 07/19/2023 2.43     Vitamin D, 25-Hydroxy 07/19/2023 33     Hemoglobin A1C 07/19/2023 6.1 (A)     Estimated Average Glucose 07/19/2023 128    Legacy Encounter on 06/27/2023   Component Date Value    Ventricular Rate 06/27/2023 74     Atrial Rate 06/27/2023 340     QRS Duration 06/27/2023 140     QT Interval 06/27/2023 414     QTC Calculation(Bazett) 06/27/2023 459     R Axis 06/27/2023 1     T Axis 06/27/2023 134     QRS Count 06/27/2023 12     Q Onset 06/27/2023 213     T Offset 06/27/2023 420     QTC Fredericia 06/27/2023 444        Assessment/Plan   Follow up in four months  Lab work was done in July  Continue current medications

## 2023-10-11 ENCOUNTER — HOSPITAL ENCOUNTER (OUTPATIENT)
Dept: NEUROLOGY | Facility: CLINIC | Age: 83
Discharge: HOME | End: 2023-10-11
Payer: MEDICARE

## 2023-10-11 DIAGNOSIS — G56.01 CARPAL TUNNEL SYNDROME OF RIGHT WRIST: ICD-10-CM

## 2023-10-11 DIAGNOSIS — R20.0 NUMBNESS IN FEET: ICD-10-CM

## 2023-10-11 DIAGNOSIS — G62.89 OTHER POLYNEUROPATHY: Primary | ICD-10-CM

## 2023-10-11 PROCEDURE — 95913 NRV CNDJ TEST 13/> STUDIES: CPT | Performed by: PSYCHIATRY & NEUROLOGY

## 2023-10-11 PROCEDURE — 95886 MUSC TEST DONE W/N TEST COMP: CPT | Performed by: PSYCHIATRY & NEUROLOGY

## 2023-10-11 NOTE — PROCEDURES
EMG & nerve conduction    Date/Time: 10/11/2023 2:55 PM    Performed by: Jayce Borden MD  Authorized by: Jayce Borden MD    Consent:     Consent obtained:  Written    Consent given by:  Patient  Universal protocol:     Procedure explained and questions answered to patient or proxy's satisfaction: yes      Patient identity confirmed:  Verbally with patient  Indications:     Indications:  Polyneuropathy  Sedation:     Sedation type:  None  Anesthesia:     Anesthesia method:  None  Procedure specific details:      This is an abnormal study, with findings as follows:    1) There is electrophysiologic evidence consistent with a mild right median neuropathy at the wrist without active denervation in the sampled thenar muscle.    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    3) There is electrophysiologic evidence suggestive of a superimposed chronic right lumbosacral polyradiculopathy.    Jayce Borden MD    Post-procedure details:     Procedure completion:  Tolerated well, no immediate complications

## 2023-10-12 ENCOUNTER — PHARMACY VISIT (OUTPATIENT)
Dept: PHARMACY | Facility: CLINIC | Age: 83
End: 2023-10-12
Payer: MEDICARE

## 2023-10-12 DIAGNOSIS — I48.91 A-FIB (MULTI): Primary | ICD-10-CM

## 2023-10-12 DIAGNOSIS — E11.9 DIABETES MELLITUS TYPE II, NON INSULIN DEPENDENT (MULTI): ICD-10-CM

## 2023-10-16 ENCOUNTER — PHARMACY VISIT (OUTPATIENT)
Dept: PHARMACY | Facility: CLINIC | Age: 83
End: 2023-10-16
Payer: MEDICARE

## 2023-10-16 PROCEDURE — RXMED WILLOW AMBULATORY MEDICATION CHARGE

## 2023-10-17 ENCOUNTER — ANCILLARY PROCEDURE (OUTPATIENT)
Dept: RADIOLOGY | Facility: CLINIC | Age: 83
End: 2023-10-17
Payer: MEDICARE

## 2023-10-17 DIAGNOSIS — Z78.0 ASYMPTOMATIC MENOPAUSAL STATE: ICD-10-CM

## 2023-10-17 PROCEDURE — 77080 DXA BONE DENSITY AXIAL: CPT | Performed by: RADIOLOGY

## 2023-10-17 PROCEDURE — 77080 DXA BONE DENSITY AXIAL: CPT

## 2023-10-17 NOTE — RESULT ENCOUNTER NOTE
Bone density scan showed some improvement from previous read.  Continue Prolia, continue with scans every 2 years.

## 2023-10-17 NOTE — LETTER
October 20, 2023     Marcelle Hewitt  [address] [facility] [address] [address]      Dear Ms. Hewitt:    Below are the results from your recent visit:      Bone density scan showed some improvement from previous read.  Continue Prolia, continue with scans every 2 years.           If you have any questions or concerns, please don't hesitate to call.

## 2023-10-20 ENCOUNTER — TELEPHONE (OUTPATIENT)
Dept: NEUROLOGY | Facility: CLINIC | Age: 83
End: 2023-10-20
Payer: MEDICARE

## 2023-10-20 DIAGNOSIS — G62.89 OTHER POLYNEUROPATHY: Primary | ICD-10-CM

## 2023-10-20 NOTE — TELEPHONE ENCOUNTER
She contacted the office regarding the recent EMG and questions about how to proceed.    I reviewed the findings of the EMG.  Moderate chronic axonal polyneuropathy.  Mild right median neuropathy at the wrist.  Cannot exclude superimposed component of chronic right lumbar polyradiculopathy.    I recommended further laboratory testing for etiologies of polyneuropathy.  She will have the labs drawn at her convenience and my office will contact her with the results.    I recommended night splinting of the right wrist for median neuropathy at the wrist.  I reviewed the appropriate type of splint for her to obtain and counseled her to try to wear this consistently overnight every night.

## 2023-10-24 ENCOUNTER — CLINICAL SUPPORT (OUTPATIENT)
Dept: PHARMACY | Facility: HOSPITAL | Age: 83
End: 2023-10-24
Payer: MEDICARE

## 2023-10-24 ENCOUNTER — PHARMACY VISIT (OUTPATIENT)
Dept: PHARMACY | Facility: CLINIC | Age: 83
End: 2023-10-24
Payer: MEDICARE

## 2023-10-24 DIAGNOSIS — I50.32 CHRONIC DIASTOLIC CONGESTIVE HEART FAILURE (MULTI): Primary | ICD-10-CM

## 2023-10-24 DIAGNOSIS — I48.91 A-FIB (MULTI): ICD-10-CM

## 2023-10-24 DIAGNOSIS — I48.0 PAROXYSMAL ATRIAL FIBRILLATION (MULTI): ICD-10-CM

## 2023-10-24 DIAGNOSIS — E11.9 DIABETES MELLITUS TYPE II, NON INSULIN DEPENDENT (MULTI): ICD-10-CM

## 2023-10-24 PROCEDURE — RXMED WILLOW AMBULATORY MEDICATION CHARGE

## 2023-10-24 NOTE — Clinical Note
Larry Hadley! Completed a 3 month follow up with Marcelle. She is doing very well. Seems like Jardiance has helped with SOB/coughing. Will follow up in 3 months.

## 2023-10-24 NOTE — PROGRESS NOTES
"Pharmacist Clinic: Anticoagulation and Heart Failure Management  Marcelle Hewitt was referred to the Clinical Pharmacy Team for her anticoagulation and Heart Failure management.    Referring Provider:  Afua Best    Last Appointment w/ Pharmacist: 7/28/2023   Pharmacist Name: Roxannberta Cabezasris   _______________________________________________________________________  PHARMACY ASSESSMENT    Review of Past Appointment:   - Patient was referred for Eliquis and Jardiance management. I got her set up for financial aid through [facility]. Now receiving both medications for free.     RELEVANT LAB RESULTS  Lab Results   Component Value Date    BILITOT 0.4 07/19/2023    CALCIUM 9.2 07/19/2023    CO2 28 07/19/2023     (H) 07/19/2023    CREATININE 0.96 07/19/2023    GLUCOSE 99 07/19/2023    ALKPHOS 67 07/19/2023    K 4.4 07/19/2023    PROT 6.2 (L) 07/19/2023     07/19/2023    AST 16 07/19/2023    ALT 13 07/19/2023    BUN 19 07/19/2023    ANIONGAP 11 07/19/2023    MG 2.00 03/17/2023    PHOS 2.8 03/18/2023    ALBUMIN 3.8 07/19/2023    GFRF 58 (A) 07/19/2023     Lab Results   Component Value Date    TRIG 148 07/19/2023    CHOL 137 07/19/2023    HDL 45.6 07/19/2023     No results found for: \"BMCBC\", \"CBCDIF\"   _______________________________________________________________________  ANTICOAGULATION ASSESSMENT    The ASCVD Risk score (Thomas DK, et al., 2019) failed to calculate for the following reasons:    The 2019 ASCVD risk score is only valid for ages 40 to 79    DIAGNOSIS: prevention of nonvalvular atrial fibrilliation stroke and systemic embolism  - Patient is projected to be on anticoagulation indefinitely   - [id number] Score: [6] (only included if diagnosis is atrial fibrillation)   Age: [<65 (0)] [65-74 (+1)] [> 75 (+2)]: 2  Sex: [Male/Female (+1)]: 1  CHF history: [No/Yes(+1)]: 1  Hypertension history: [No/Yes(+1)]: 1  Stroke/TIA/thromboembolism history: [No/Yes(+2)]: 0  Vascular disease history (prior MI, " peripheral artery disease, aortic plaque): [No/Yes(+1)]: 0  Diabetes history: [No/Yes(+1)]: 1    CURRENT PHARMACOTHERAPY:   - Eliquis 2.5 mg  - 84 yo  - Estes: 88.3 kg  - Scr 0.96  - Provider prefers lower dose with kidney function     UPDATE ON PHARMACOTHERAPY:   Affordability/Accessibility: Receiving Eliquis for free through  PAP. Renewal: 8/4/2024  Adherence/Organization: Taking as prescribed   Adverse Effects: No side effects; every now and then little bruising     Recent Hospitalizations: No (3/17/2023 - a fib diagnosis)   Recent Falls/Trauma: No - careful when moving around   Changes in Tobacco or Alcohol Intake: No alcohol intake - sometimes a glass of wine when out for events  _______________________________________________________________________  CHF ASSESSMENT     Symptom/Staging:  -Most recent ejection fraction: 55-60% (4/2022)  -NYHA Stage: Unknown    Guideline-Directed Medical Therapy:  -ARNI: No   -If no, then ACEi/ARB?: Yes, describe: losartan  -Beta Blocker: Yes, describe: metoprolol  -MRA: No  -SGLT2i: Yes, describe: Jardiance    Secondary Prevention:  -The ASCVD Risk score (Thomas DK, et al., 2019) failed to calculate for the following reasons:    The 2019 ASCVD risk score is only valid for ages 40 to 79   -Aspirin 81mg? yes  -Statin?: Yes, describe: rosuvastatin  -HTN?: Yes, describe: last 156/72    CURRENT PHARMACOTHERAPY:   - Jardiance 25 mg every day  - GFR 58  - A1c 6.1%    UPDATE ON PHARMACOTHERAPY:   Affordability/Accessibility: Patient is receiving Jardiance for free through  PAP. Renewal: 8/4/2024  Adherence/Organization: Taking as prescribed   Adverse Effects: No side effects    Monitoring Weights at Home: No  Home Weight Recordings: NA    Past In Office Weight Readings: 194 lbs    Monitoring Blood Pressure at Home: Yes  Home BP Recordings: 150/60-70    Past In Office BP Readings: 156/72    HEALTH MANAGEMENT    Maintaining fluid restriction (<2 L/day): No  Edema/swelling: Yes -  occasionally in the left leg/lower calf; when that happens she takes her Lasix    Shortness of breath: No - improved since taking Jardiance; still there a little bit  Trouble sleeping/lying down: No - does have sleep apnea but wears mask  and sleeps very well   Dry/hacking cough: No  Recent Hospitalizations: No    ______________________________________________________________________  PATIENT EDUCATION/GOALS  - Counseled patient on MOA, expectations, duration of therapy, contraindications, administration, and monitoring parameters  - Counseled patient of side effects that are indicative of bleeding such as dark tarry stool, unexplainable bruising, or vomiting up a coffee ground like substance  - Counseled patient on lifestyle modifications that can decrease your risk of having complications (smoking cessation, losing weight, daily weights, vaccines)  - Counseled patient on fluid intake and weight management. Recommended to not consume more than 2 liters of fliuids per day. If they have gained more than 2-3 pounds within a 24 hours period (or 5 pounds in a week), contact their cardiologist  - Answered all patient questions and concerns  _______________________________________________________  RECOMMENDATIONS/PLAN  1. CONTINUE Jardiance 25 mg every day and Eliquis 2.5 mg BID   2. New prescriptions were sent to [facility] pharmacy as old prescriptions were mixed up with conversion.   3. Will follow up in 3 months.     Next Cardiology Appointment: 12/26/2023  Clinical Pharmacist follow up: 1/24/2024  VAF/Application Expiration: Yes    Date: 8/4/2024  Type of Encounter: Leeann Crowley PharmD    Verbal consent to manage patient's drug therapy was obtained from the patient . They were informed they may decline to participate or withdraw from participation in pharmacy services at any time.    Continue all meds under the continuation of care with the referring provider and clinical pharmacy team.

## 2023-10-25 ENCOUNTER — OFFICE VISIT (OUTPATIENT)
Dept: SLEEP MEDICINE | Facility: CLINIC | Age: 83
End: 2023-10-25
Payer: MEDICARE

## 2023-10-25 ENCOUNTER — LAB (OUTPATIENT)
Dept: LAB | Facility: LAB | Age: 83
End: 2023-10-25
Payer: MEDICARE

## 2023-10-25 VITALS
HEIGHT: 60 IN | SYSTOLIC BLOOD PRESSURE: 150 MMHG | WEIGHT: 195 LBS | OXYGEN SATURATION: 98 % | DIASTOLIC BLOOD PRESSURE: 62 MMHG | BODY MASS INDEX: 38.28 KG/M2 | HEART RATE: 70 BPM

## 2023-10-25 DIAGNOSIS — I10 HYPERTENSION, UNSPECIFIED TYPE: ICD-10-CM

## 2023-10-25 DIAGNOSIS — G47.33 OBSTRUCTIVE SLEEP APNEA: ICD-10-CM

## 2023-10-25 DIAGNOSIS — G47.33 OSA (OBSTRUCTIVE SLEEP APNEA): Primary | ICD-10-CM

## 2023-10-25 DIAGNOSIS — G62.89 OTHER POLYNEUROPATHY: ICD-10-CM

## 2023-10-25 LAB
PROT SERPL-MCNC: 6.5 G/DL (ref 6.4–8.2)
RHEUMATOID FACT SER NEPH-ACNC: 12 IU/ML (ref 0–15)
VIT B12 SERPL-MCNC: 222 PG/ML (ref 211–911)

## 2023-10-25 PROCEDURE — 86320 SERUM IMMUNOELECTROPHORESIS: CPT | Performed by: PSYCHIATRY & NEUROLOGY

## 2023-10-25 PROCEDURE — 1160F RVW MEDS BY RX/DR IN RCRD: CPT | Performed by: STUDENT IN AN ORGANIZED HEALTH CARE EDUCATION/TRAINING PROGRAM

## 2023-10-25 PROCEDURE — 1036F TOBACCO NON-USER: CPT | Performed by: STUDENT IN AN ORGANIZED HEALTH CARE EDUCATION/TRAINING PROGRAM

## 2023-10-25 PROCEDURE — 84155 ASSAY OF PROTEIN SERUM: CPT

## 2023-10-25 PROCEDURE — 1126F AMNT PAIN NOTED NONE PRSNT: CPT | Performed by: STUDENT IN AN ORGANIZED HEALTH CARE EDUCATION/TRAINING PROGRAM

## 2023-10-25 PROCEDURE — 99214 OFFICE O/P EST MOD 30 MIN: CPT | Performed by: STUDENT IN AN ORGANIZED HEALTH CARE EDUCATION/TRAINING PROGRAM

## 2023-10-25 PROCEDURE — 3078F DIAST BP <80 MM HG: CPT | Performed by: STUDENT IN AN ORGANIZED HEALTH CARE EDUCATION/TRAINING PROGRAM

## 2023-10-25 PROCEDURE — 82607 VITAMIN B-12: CPT

## 2023-10-25 PROCEDURE — 86431 RHEUMATOID FACTOR QUANT: CPT

## 2023-10-25 PROCEDURE — 82525 ASSAY OF COPPER: CPT

## 2023-10-25 PROCEDURE — 36415 COLL VENOUS BLD VENIPUNCTURE: CPT

## 2023-10-25 PROCEDURE — 1159F MED LIST DOCD IN RCRD: CPT | Performed by: STUDENT IN AN ORGANIZED HEALTH CARE EDUCATION/TRAINING PROGRAM

## 2023-10-25 PROCEDURE — 86334 IMMUNOFIX E-PHORESIS SERUM: CPT

## 2023-10-25 PROCEDURE — 86038 ANTINUCLEAR ANTIBODIES: CPT

## 2023-10-25 PROCEDURE — 84165 PROTEIN E-PHORESIS SERUM: CPT | Performed by: PSYCHIATRY & NEUROLOGY

## 2023-10-25 PROCEDURE — 3077F SYST BP >= 140 MM HG: CPT | Performed by: STUDENT IN AN ORGANIZED HEALTH CARE EDUCATION/TRAINING PROGRAM

## 2023-10-25 PROCEDURE — 84165 PROTEIN E-PHORESIS SERUM: CPT

## 2023-10-25 ASSESSMENT — SLEEP AND FATIGUE QUESTIONNAIRES
HOW LIKELY ARE YOU TO NOD OFF OR FALL ASLEEP WHEN YOU ARE A PASSENGER IN A CAR FOR AN HOUR WITHOUT A BREAK: SLIGHT CHANCE OF DOZING
HOW LIKELY ARE YOU TO NOD OFF OR FALL ASLEEP WHILE SITTING AND READING: SLIGHT CHANCE OF DOZING
HOW LIKELY ARE YOU TO NOD OFF OR FALL ASLEEP WHILE LYING DOWN TO REST IN THE AFTERNOON WHEN CIRCUMSTANCES PERMIT: SLIGHT CHANCE OF DOZING
HOW LIKELY ARE YOU TO NOD OFF OR FALL ASLEEP WHILE SITTING AND TALKING TO SOMEONE: WOULD NEVER DOZE
HOW LIKELY ARE YOU TO NOD OFF OR FALL ASLEEP WHILE SITTING QUIETLY AFTER LUNCH WITHOUT ALCOHOL: WOULD NEVER DOZE
SITING INACTIVE IN A PUBLIC PLACE LIKE A CLASS ROOM OR A MOVIE THEATER: WOULD NEVER DOZE
SLEEP_PROBLEM_NOTICEABLE_TO_OTHERS: A LITTLE
WORRIED_DISTRESSED_DUE_TO_SLEEP: A LITTLE
SATISFACTION_WITH_CURRENT_SLEEP_PATTERN: SATISFIED
HOW LIKELY ARE YOU TO NOD OFF OR FALL ASLEEP IN A CAR, WHILE STOPPED FOR A FEW MINUTES IN TRAFFIC: SLIGHT CHANCE OF DOZING
HOW LIKELY ARE YOU TO NOD OFF OR FALL ASLEEP WHILE WATCHING TV: SLIGHT CHANCE OF DOZING
ESS-CHAD TOTAL SCORE: 5
SLEEP_PROBLEM_INTERFERES_DAILY_ACTIVITIES: A LITTLE

## 2023-10-25 NOTE — ASSESSMENT & PLAN NOTE
BP Readings from Last 1 Encounters:   10/25/23 150/62     - doing well, asymptomatic  - discussed at length the impact of untreated ALEXANDER and BP control  - continue current management and follow-up with PCP

## 2023-10-25 NOTE — ASSESSMENT & PLAN NOTE
BMI Readings from Last 1 Encounters:   10/25/23 38.08 kg/m²     - encouraged healthy weight loss via diet and exercise  - consider referral to the weight loss program at follow-up visit

## 2023-10-25 NOTE — ASSESSMENT & PLAN NOTE
- doing well with PAP therapy  - continue current setting  - renew PAP supply orders  - [facility] to fix the CPAP upload syncing issue

## 2023-10-25 NOTE — PROGRESS NOTES
Patient: Marcelle Hewitt    [id number]  : 1940 -- AGE 83 y.o.    Provider: Asim Shipman MD     Location [facility]   Service Date: 10/25/2023              [facility]  Followup Visit Note    HISTORY OF PRESENT ILLNESS     HISTORY OF PRESENT ILLNESS   Marcelle Hewitt is a 83 y.o. female with h/o ALEXANDER and Obesity who presents to a [facility] for followup.     Assessment and plan from last visit: 22    82 yo female with h/o HTN, DM, Obesity and ALEXANDER on CPAP here for f/u visit     # ALEXANDER  - previously diagnosed in  with severe ALEXANDER with AHI ~ 70  - doing well, continue current setting  - renew supply order sent to [employer]  - last download ended on  this year, potentially because the discontinuation of 3G service (hence not transmitting data any longer)  - checking against the machine, the last 30 day compliance remained excellent with good rAHI controlled.  - her machine is old and is running out of motor lifespan.   - ordering a new machine     # HTN  - 145/60  - no headache, blurry vision, chest pain, palpitation, dizziness, syncopal episodes   - continue to f/u with PCP      # Obesity  - current BMI 37  - working on weight loss  - Encouraged continuing healthy weight loss via diet and exercise      RTC 3 months after receiving the new machine        Current History    On today's visit, the patient reports that she went through some troubleshooting with [employer] and they just replaced her machine.  Now the machine is working properly, but doesn't seem like it's connected to [employer] airview cloud.      PAP Info  DURABLE MEDICAL EQUIPMENT COMPANY: [employer]  Machine: THERAPY: [employer] AIRSENSE 11  10 cm H2O - 16 cm H2O  Issues with therapy: ISSUES WITH THERAPY: None  Benefits with PAP: PERCEIVED BENEFITS OF PAP: refreshing sleep    RLS Followup:   none.    Daytime Symptoms    Patient reports DAYTIME SYMPTOMS: no daytime  symptoms  Patient denies daytime symptoms including: Denies: excessive daytime sleepiness sleep inertia late to work/school due to sleepiness irritability during the day difficulty with memory or concentration during the day feeling sleepy when driving    Naps: No  Fatigue: denies feeling fatigue    ESS: 5  MARY: 4  FOSQ: 38    REVIEW OF SYSTEMS     REVIEW OF SYSTEMS  Review of Systems      ALLERGIES AND MEDICATIONS     ALLERGIES  Allergies   Allergen Reactions    Niacin Unknown    Pregabalin Unknown    Sulfa (Sulfonamide Antibiotics) Unknown       MEDICATIONS: She has a current medication list which includes the following prescription(s): albuterol - Inhale 4 times a day as needed, apixaban - Take 1 tablet (2.5 mg) by mouth 2 times a day, aspirin - Take 1 tablet (81 mg) by mouth once daily, calcium phosphate-vitamin d3 - TAKE BY MOUTH AS DIRECTED, cholecalciferol - Take 1 capsule (2,000 Units) by mouth once daily, prolia - 60 mg subq every 6 months, diclofenac sodium, empagliflozin - Take 1 tablet (25 mg) by mouth once daily, esomeprazole - Take 1 capsule (20 mg) by mouth once daily, flecainide - Take 1 tablet (50 mg) by mouth 2 times a day, furosemide - Take 1 tablet (40 mg) by mouth once daily, losartan - Take 1 tablet (100 mg) by mouth once daily, metoprolol tartrate - Take 1 tablet (25 mg) by mouth 2 times a day, and rosuvastatin - Take 1 tablet (40 mg) by mouth once daily.    PAST MEDICAL HISTORY : She  has a past medical history of Abnormal finding of blood chemistry, unspecified (07/08/2022), Anesthesia of skin (11/12/2020), Bilateral primary osteoarthritis of hip (03/01/2021), Dermatochalasis of unspecified eye, unspecified eyelid (05/06/2019), Dry eye syndrome of bilateral lacrimal glands (03/10/2022), Dry eye syndrome of unspecified lacrimal gland (04/29/2016), Encounter for prophylactic measures, unspecified (11/12/2020), Encounter for screening mammogram for malignant neoplasm of breast, Headache,  unspecified (07/02/2021), History of falling (07/12/2017), Keratoconjunctivitis sicca, not specified as Sjogren's, bilateral (03/10/2022), Meibomian gland dysfunction left eye, upper and lower eyelids (03/10/2022), Meibomian gland dysfunction right eye, upper and lower eyelids (03/10/2022), Menopausal and female climacteric states (04/29/2016), Morbid (severe) obesity due to excess calories (CMS/[facility]) (07/08/2022), Myopia, bilateral (03/10/2022), Myopia, right eye (03/10/2022), Nonexudative age-related macular degeneration, bilateral, early dry stage (05/06/2019), Obesity, unspecified (12/02/2020), Obstructive sleep apnea (adult) (pediatric) (09/21/2017), Other abnormalities of breathing (12/20/2021), Other conditions influencing health status, Other conditions influencing health status, Other fracture of upper and lower end of left fibula, subsequent encounter for closed fracture with routine healing (06/04/2019), Other headache syndrome (07/13/2016), Other secondary cataract, unspecified eye, Other specified abnormal findings of blood chemistry (04/08/2020), Other symptoms and signs involving the musculoskeletal system (09/08/2021), Other vitreous opacities, unspecified eye, Pain in leg, unspecified (11/13/2019), Pain in unspecified limb (09/14/2017), Personal history of diseases of the skin and subcutaneous tissue (04/11/2019), Personal history of other diseases of the musculoskeletal system and connective tissue (07/25/2017), Personal history of other diseases of the musculoskeletal system and connective tissue (07/12/2017), Personal history of other diseases of the musculoskeletal system and connective tissue (06/27/2019), Personal history of other diseases of the musculoskeletal system and connective tissue (03/02/2021), Personal history of other diseases of the nervous system and sense organs (03/10/2022), Personal history of other diseases of the nervous system and sense organs (11/16/2017), Personal history  of other diseases of the nervous system and sense organs (04/17/2019), Personal history of other drug therapy (10/06/2021), Personal history of other medical treatment (02/22/2021), Personal history of other specified conditions (10/06/2021), Personal history of other specified conditions (02/15/2022), Personal history of other specified conditions (12/08/2020), Personal history of other specified conditions (12/19/2017), Personal history of other specified conditions (04/03/2018), Personal history of other specified conditions (07/08/2022), Personal history of other specified conditions (04/12/2019), Personal history of urinary (tract) infections (07/15/2021), Polyosteoarthritis, unspecified (11/12/2020), Presence of intraocular lens, Presence of intraocular lens (03/10/2022), Presence of spectacles and contact lenses, Shortness of breath (06/30/2022), Spinal stenosis, lumbar region without neurogenic claudication (02/15/2022), Unilateral primary osteoarthritis, left knee (05/07/2020), Unspecified disorder of refraction (03/10/2022), Unspecified epiphora, unspecified side, and Urinary tract infection, site not specified (12/19/2017).    PAST SURGICAL HISTORY: She  has a past surgical history that includes Other surgical history (06/04/2019); Cardiac pacemaker placement (09/17/2021); and Cataract extraction (11/15/2013).     FAMILY HISTORY: No changes since previous visit. Otherwise non-contributory as charted.     SOCIAL HISTORY  She  reports that she has never smoked. She has never used smokeless tobacco. She reports that she does not drink alcohol and does not use drugs.       PHYSICAL EXAM     VITAL SIGNS: /62   Pulse 70   Ht 1.524 m (5')   Wt 88.5 kg (195 lb)   SpO2 98%   BMI 38.08 kg/m²      PREVIOUS WEIGHTS:  Wt Readings from Last 3 Encounters:   10/25/23 88.5 kg (195 lb)   10/10/23 88.3 kg (194 lb 9.6 oz)   10/10/23 87.5 kg (193 lb)         RESULTS/DATA     Bicarbonate (mmol/L)   Date Value    07/19/2023 28   03/18/2023 22   03/17/2023 22       PAP Adherence  A PAP adherence download was obtained and data was reviewed personally today in clinic.        ASSESSMENT/PLAN     Ms. Hewitt is a 83 y.o. female and she returns in followup to the [facility] for ALEXANDER.    Problem List, Orders, Assessment, Recommendations:  Problem List Items Addressed This Visit             ICD-10-CM    Hypertension I10     BP Readings from Last 1 Encounters:   10/25/23 150/62   - doing well, asymptomatic  - discussed at length the impact of untreated ALEXANDER and BP control  - continue current management and follow-up with PCP          Obstructive sleep apnea G47.33     - doing well with PAP therapy  - continue current setting  - renew PAP supply orders  - [facility] to fix the CPAP upload syncing issue           BMI 38.0-38.9,adult Z68.38     BMI Readings from Last 1 Encounters:   10/25/23 38.08 kg/m²   - encouraged healthy weight loss via diet and exercise  - consider referral to the weight loss program at follow-up visit            Other Visit Diagnoses         Codes    ALEXANDER (obstructive sleep apnea)    -  Primary G47.33            Disposition    Return to clinic in 12 months

## 2023-10-26 LAB — ANA SER QL HEP2 SUBST: NEGATIVE

## 2023-10-27 LAB — COPPER SERPL-MCNC: 108.4 UG/DL (ref 80–155)

## 2023-10-30 LAB
ALBUMIN: 3.8 G/DL (ref 3.4–5)
ALPHA 1 GLOBULIN: 0.4 G/DL (ref 0.2–0.6)
ALPHA 2 GLOBULIN: 1 G/DL (ref 0.4–1.1)
BETA GLOBULIN: 0.8 G/DL (ref 0.5–1.2)
GAMMA GLOBULIN: 0.6 G/DL (ref 0.5–1.4)
IMMUNOFIXATION COMMENT: NORMAL
PATH REVIEW - SERUM IMMUNOFIXATION: NORMAL
PATH REVIEW-SERUM PROTEIN ELECTROPHORESIS: NORMAL
PROTEIN ELECTROPHORESIS COMMENT: NORMAL

## 2023-11-02 ENCOUNTER — TELEPHONE (OUTPATIENT)
Dept: NEUROLOGY | Facility: CLINIC | Age: 83
End: 2023-11-02
Payer: MEDICARE

## 2023-11-08 ENCOUNTER — PHARMACY VISIT (OUTPATIENT)
Dept: PHARMACY | Facility: CLINIC | Age: 83
End: 2023-11-08
Payer: MEDICARE

## 2023-11-08 PROCEDURE — RXMED WILLOW AMBULATORY MEDICATION CHARGE

## 2023-11-27 DIAGNOSIS — E78.5 HYPERLIPIDEMIA: Primary | ICD-10-CM

## 2023-11-27 RX ORDER — ROSUVASTATIN CALCIUM 40 MG/1
40 TABLET, COATED ORAL DAILY
Qty: 90 TABLET | Refills: 3 | Status: SHIPPED | OUTPATIENT
Start: 2023-11-27 | End: 2024-03-08 | Stop reason: SDUPTHER

## 2023-11-28 ENCOUNTER — OFFICE VISIT (OUTPATIENT)
Dept: CARDIOLOGY | Facility: CLINIC | Age: 83
End: 2023-11-28
Payer: MEDICARE

## 2023-11-28 VITALS
BODY MASS INDEX: 36.91 KG/M2 | OXYGEN SATURATION: 98 % | WEIGHT: 188 LBS | SYSTOLIC BLOOD PRESSURE: 119 MMHG | HEART RATE: 78 BPM | DIASTOLIC BLOOD PRESSURE: 57 MMHG | HEIGHT: 60 IN

## 2023-11-28 DIAGNOSIS — I48.0 PAROXYSMAL ATRIAL FIBRILLATION (MULTI): Primary | ICD-10-CM

## 2023-11-28 PROCEDURE — 1126F AMNT PAIN NOTED NONE PRSNT: CPT | Performed by: INTERNAL MEDICINE

## 2023-11-28 PROCEDURE — 1160F RVW MEDS BY RX/DR IN RCRD: CPT | Performed by: INTERNAL MEDICINE

## 2023-11-28 PROCEDURE — 99214 OFFICE O/P EST MOD 30 MIN: CPT | Performed by: INTERNAL MEDICINE

## 2023-11-28 PROCEDURE — 3078F DIAST BP <80 MM HG: CPT | Performed by: INTERNAL MEDICINE

## 2023-11-28 PROCEDURE — 1159F MED LIST DOCD IN RCRD: CPT | Performed by: INTERNAL MEDICINE

## 2023-11-28 PROCEDURE — 3074F SYST BP LT 130 MM HG: CPT | Performed by: INTERNAL MEDICINE

## 2023-11-28 PROCEDURE — 1036F TOBACCO NON-USER: CPT | Performed by: INTERNAL MEDICINE

## 2023-11-28 PROCEDURE — 93005 ELECTROCARDIOGRAM TRACING: CPT | Performed by: INTERNAL MEDICINE

## 2023-11-28 ASSESSMENT — ENCOUNTER SYMPTOMS
OCCASIONAL FEELINGS OF UNSTEADINESS: 0
LOSS OF SENSATION IN FEET: 0
DEPRESSION: 0

## 2023-11-28 ASSESSMENT — PATIENT HEALTH QUESTIONNAIRE - PHQ9
1. LITTLE INTEREST OR PLEASURE IN DOING THINGS: NOT AT ALL
SUM OF ALL RESPONSES TO PHQ9 QUESTIONS 1 AND 2: 0
2. FEELING DOWN, DEPRESSED OR HOPELESS: NOT AT ALL

## 2023-11-28 ASSESSMENT — PAIN SCALES - GENERAL: PAINLEVEL: 0-NO PAIN

## 2023-11-28 NOTE — PROGRESS NOTES
Referred by Sebastian Bernal MD provider found for   Chief Complaint   Patient presents with    Atrial Fibrillation        Marcelle Hewitt is a 83 y.o. year old female patient with h/o PAF, sinus node dysfunction s/p PPM. Presents for follow up.      PMHx/PSHx: As above  Tobacco Denies, Alcohol Denies, Caffeine use  Denies, Drug use  Denies  FamHx: unremarkable     Allergies:  Allergies   Allergen Reactions    Niacin Unknown    Pregabalin Unknown    Sulfa (Sulfonamide Antibiotics) Unknown        Review of Systems    Constitutional: not feeling tired.   Eyes: no eyesight problems.   ENT: no hearing loss and no nosebleeds.   Cardiovascular: no intermittent leg claudication and as noted in HPI.   Respiratory: no chronic cough and no shortness of breath.   Gastrointestinal: no change in bowel habits and no blood in stools.   Genitourinary: no urinary frequency and no hematuria.   Skin: no skin rashes.   Neurological: no seizures and no frequent falls.   Psychiatric: no depression and not suicidal.   All other systems have been reviewed and are negative for complaint.     Outpatient Medications:  Current Outpatient Medications   Medication Instructions    aspirin 81 mg EC tablet 1 tablet, oral, Daily    calcium phosphate-vitamin D3 250 mg-12.5 mcg (500 unit) tablet,chewable TAKE BY MOUTH AS DIRECTED    cholecalciferol (Vitamin D-3) 50 mcg (2,000 unit) capsule 1 capsule, oral, Daily    denosumab (Prolia) 60 mg/mL syringe 60 mg subq every 6 months    diclofenac sodium (Voltaren) 1 % gel gel     Eliquis 2.5 mg, oral, 2 times daily    esomeprazole (NexIUM) 20 mg DR capsule 1 capsule, oral, Daily    flecainide (Tambocor) 50 mg tablet 1 tablet, oral, 2 times daily    furosemide (Lasix) 40 mg tablet 1 tablet, oral, Daily, PRN swelling    Jardiance 25 mg, oral, Daily    losartan (Cozaar) 100 mg tablet 1 tablet, oral, Daily    metoprolol tartrate (Lopressor) 25 mg tablet 1 tablet, oral, 2 times daily    rosuvastatin  (CRESTOR) 40 mg, oral, Daily         Last Recorded Vitals:      4/11/2023     1:28 PM 6/27/2023    10:45 AM 7/13/2023    10:46 AM 10/10/2023    10:56 AM 10/10/2023     2:05 PM 10/25/2023     9:49 AM 11/28/2023     9:58 AM   Vitals   Systolic 158 115 144 125 156 150 119   Diastolic 60 62 70 71 72 62 57   Heart Rate 76 71 76 81 70 70 78   Resp 16         Height (in) 1.524 m (5') 1.524 m (5') 1.524 m (5') 1.524 m (5') 1.524 m (5') 1.524 m (5') 1.524 m (5')   Weight (lb) 191.5  193 193 194.6 195 188   BMI 37.4 kg/m2 37.4 kg/m2 37.69 kg/m2 37.69 kg/m2 38.01 kg/m2 38.08 kg/m2 36.72 kg/m2   BSA (m2) 1.92 m2 1.92 m2 1.92 m2 1.92 m2 1.93 m2 1.94 m2 1.9 m2   Visit Report   Report Report    Report Report    Report Report Report    Visit Vitals  /57 (BP Location: Right arm, Patient Position: Sitting, BP Cuff Size: Large adult)   Pulse 78   Ht 1.524 m (5')   Wt 85.3 kg (188 lb)   SpO2 98%   BMI 36.72 kg/m²   Smoking Status Never   BSA 1.9 m²        Physical Exam:  Constitutional: alert and in no acute distress.   Eyes: no erythema, swelling or discharge from the eye .   Neck: neck is supple, symmetric, trachea midline, no masses  and no thyromegaly .   Pulmonary: no increased work of breathing or signs of respiratory distress  and lungs clear to auscultation.    Cardiovascular: carotid pulses 2+ bilaterally with no bruit , JVP was normal, no thrills , regular rhythm, normal S1 and S2, no murmurs , pedal pulses 2+ bilaterally  and no edema .   Abdomen: abdomen non-tender, no masses  and no hepatomegaly .   Skin: skin warm and dry, normal skin turgor .   Psychiatric judgment and insight is normal  and oriented to person, place and time .        Assessment/Plan   Problem List Items Addressed This Visit             ICD-10-CM    Paroxysmal atrial fibrillation (CMS/HCC) - Primary I48.0    Relevant Orders    ECG 12 lead (Clinic Performed)       Marcelle F Kash is a 83 y.o. year old female patient with h/o PAF, sinus node  dysfunction s/p PPM. Presents for follow up.    Her current ECG shows A paced rhythm with intrinsic conduction with LBBB ( msec).   The patient reports doing well. She has a stable SOB for years, denies chest pain or SOB. Will continue her current treatment and follow up in 1 year.         Sebastian White MD  Cardiac Electrophysiology      Thank you very much for allowing me to participate in the care of this pleasant patient. Please do not hesitate to contact me with any further questions or concerns regarding his care.    **Disclaimer: This note was dictated by speech recognition, and every effort has been made to prevent any error in transcription, however minor errors may be present**

## 2023-12-05 LAB
ATRIAL RATE: 78 BPM
P AXIS: 101 DEGREES
P OFFSET: 193 MS
P ONSET: 148 MS
PR INTERVAL: 130 MS
Q ONSET: 213 MS
QRS COUNT: 13 BEATS
QRS DURATION: 138 MS
QT INTERVAL: 404 MS
QTC CALCULATION(BAZETT): 460 MS
QTC FREDERICIA: 441 MS
R AXIS: -11 DEGREES
T AXIS: 141 DEGREES
T OFFSET: 415 MS
VENTRICULAR RATE: 78 BPM

## 2023-12-11 ENCOUNTER — TELEMEDICINE CLINICAL SUPPORT (OUTPATIENT)
Dept: PHARMACY | Facility: HOSPITAL | Age: 83
End: 2023-12-11
Payer: MEDICARE

## 2024-01-02 ENCOUNTER — HOSPITAL ENCOUNTER (OUTPATIENT)
Dept: CARDIOLOGY | Facility: CLINIC | Age: 84
Discharge: HOME | End: 2024-01-02
Payer: MEDICARE

## 2024-01-02 DIAGNOSIS — I44.2 ATRIOVENTRICULAR BLOCK, COMPLETE (MULTI): ICD-10-CM

## 2024-01-02 PROCEDURE — 93296 REM INTERROG EVL PM/IDS: CPT

## 2024-01-02 PROCEDURE — 93294 REM INTERROG EVL PM/LDLS PM: CPT | Performed by: INTERNAL MEDICINE

## 2024-01-08 ENCOUNTER — TELEPHONE (OUTPATIENT)
Dept: PRIMARY CARE | Facility: CLINIC | Age: 84
End: 2024-01-08
Payer: MEDICARE

## 2024-01-08 DIAGNOSIS — M35.3 POLYMYALGIA RHEUMATICA (MULTI): ICD-10-CM

## 2024-01-08 DIAGNOSIS — E78.5 HYPERLIPIDEMIA, UNSPECIFIED HYPERLIPIDEMIA TYPE: ICD-10-CM

## 2024-01-08 DIAGNOSIS — M81.0 OSTEOPOROSIS, UNSPECIFIED OSTEOPOROSIS TYPE, UNSPECIFIED PATHOLOGICAL FRACTURE PRESENCE: ICD-10-CM

## 2024-01-08 DIAGNOSIS — I48.0 PAROXYSMAL ATRIAL FIBRILLATION (MULTI): ICD-10-CM

## 2024-01-08 DIAGNOSIS — E11.59 TYPE 2 DIABETES MELLITUS WITH OTHER CIRCULATORY COMPLICATIONS (MULTI): ICD-10-CM

## 2024-01-08 DIAGNOSIS — E11.22 CHRONIC KIDNEY DISEASE DUE TO DIABETES MELLITUS (MULTI): ICD-10-CM

## 2024-01-08 DIAGNOSIS — I49.5 SINOATRIAL NODE DYSFUNCTION (MULTI): ICD-10-CM

## 2024-01-08 DIAGNOSIS — E55.9 VITAMIN D DEFICIENCY: Primary | ICD-10-CM

## 2024-01-09 ENCOUNTER — LAB (OUTPATIENT)
Dept: LAB | Facility: LAB | Age: 84
End: 2024-01-09
Payer: MEDICARE

## 2024-01-09 DIAGNOSIS — E11.22 CHRONIC KIDNEY DISEASE DUE TO DIABETES MELLITUS (MULTI): ICD-10-CM

## 2024-01-09 DIAGNOSIS — I50.32 CHRONIC DIASTOLIC CONGESTIVE HEART FAILURE (MULTI): ICD-10-CM

## 2024-01-09 DIAGNOSIS — E11.59 TYPE 2 DIABETES MELLITUS WITH OTHER CIRCULATORY COMPLICATIONS (MULTI): ICD-10-CM

## 2024-01-09 DIAGNOSIS — M81.0 OSTEOPOROSIS, UNSPECIFIED OSTEOPOROSIS TYPE, UNSPECIFIED PATHOLOGICAL FRACTURE PRESENCE: ICD-10-CM

## 2024-01-09 DIAGNOSIS — G89.29 CHRONIC BACK PAIN, UNSPECIFIED BACK LOCATION, UNSPECIFIED BACK PAIN LATERALITY: ICD-10-CM

## 2024-01-09 DIAGNOSIS — Z00.00 MEDICARE ANNUAL WELLNESS VISIT, SUBSEQUENT: ICD-10-CM

## 2024-01-09 DIAGNOSIS — J45.909 ASTHMA, UNSPECIFIED ASTHMA SEVERITY, UNSPECIFIED WHETHER COMPLICATED, UNSPECIFIED WHETHER PERSISTENT (HHS-HCC): ICD-10-CM

## 2024-01-09 DIAGNOSIS — M72.0 DUPUYTREN'S CONTRACTURE: ICD-10-CM

## 2024-01-09 DIAGNOSIS — E78.5 HYPERLIPIDEMIA, UNSPECIFIED HYPERLIPIDEMIA TYPE: ICD-10-CM

## 2024-01-09 DIAGNOSIS — M35.3 POLYMYALGIA RHEUMATICA (MULTI): ICD-10-CM

## 2024-01-09 DIAGNOSIS — I49.5 SINOATRIAL NODE DYSFUNCTION (MULTI): ICD-10-CM

## 2024-01-09 DIAGNOSIS — Z00.00 WELLNESS EXAMINATION: ICD-10-CM

## 2024-01-09 DIAGNOSIS — M54.9 CHRONIC BACK PAIN, UNSPECIFIED BACK LOCATION, UNSPECIFIED BACK PAIN LATERALITY: ICD-10-CM

## 2024-01-09 DIAGNOSIS — I48.0 PAROXYSMAL ATRIAL FIBRILLATION (MULTI): ICD-10-CM

## 2024-01-09 DIAGNOSIS — E55.9 VITAMIN D DEFICIENCY: ICD-10-CM

## 2024-01-09 LAB
25(OH)D3 SERPL-MCNC: 31 NG/ML (ref 30–100)
ALBUMIN SERPL BCP-MCNC: 4 G/DL (ref 3.4–5)
ALP SERPL-CCNC: 61 U/L (ref 33–136)
ALT SERPL W P-5'-P-CCNC: 14 U/L (ref 7–45)
ANION GAP SERPL CALC-SCNC: 16 MMOL/L (ref 10–20)
AST SERPL W P-5'-P-CCNC: 15 U/L (ref 9–39)
BILIRUB SERPL-MCNC: 0.4 MG/DL (ref 0–1.2)
BUN SERPL-MCNC: 21 MG/DL (ref 6–23)
CALCIUM SERPL-MCNC: 9.5 MG/DL (ref 8.6–10.6)
CHLORIDE SERPL-SCNC: 108 MMOL/L (ref 98–107)
CHOLEST SERPL-MCNC: 137 MG/DL (ref 0–199)
CHOLESTEROL/HDL RATIO: 3.2
CO2 SERPL-SCNC: 23 MMOL/L (ref 21–32)
CREAT SERPL-MCNC: 0.99 MG/DL (ref 0.5–1.05)
CREAT UR-MCNC: 87.2 MG/DL (ref 20–320)
EGFRCR SERPLBLD CKD-EPI 2021: 56 ML/MIN/1.73M*2
ERYTHROCYTE [DISTWIDTH] IN BLOOD BY AUTOMATED COUNT: 14.2 % (ref 11.5–14.5)
EST. AVERAGE GLUCOSE BLD GHB EST-MCNC: 131 MG/DL
GLUCOSE SERPL-MCNC: 124 MG/DL (ref 74–99)
HBA1C MFR BLD: 6.2 %
HCT VFR BLD AUTO: 37 % (ref 36–46)
HDLC SERPL-MCNC: 42.8 MG/DL
HGB BLD-MCNC: 12.6 G/DL (ref 12–16)
LDLC SERPL CALC-MCNC: 60 MG/DL
MCH RBC QN AUTO: 29.4 PG (ref 26–34)
MCHC RBC AUTO-ENTMCNC: 34.1 G/DL (ref 32–36)
MCV RBC AUTO: 86 FL (ref 80–100)
MICROALBUMIN UR-MCNC: 9.5 MG/L
MICROALBUMIN/CREAT UR: 10.9 UG/MG CREAT
NON HDL CHOLESTEROL: 94 MG/DL (ref 0–149)
NRBC BLD-RTO: 0 /100 WBCS (ref 0–0)
PLATELET # BLD AUTO: 234 X10*3/UL (ref 150–450)
POTASSIUM SERPL-SCNC: 4.1 MMOL/L (ref 3.5–5.3)
PROT SERPL-MCNC: 6.2 G/DL (ref 6.4–8.2)
RBC # BLD AUTO: 4.29 X10*6/UL (ref 4–5.2)
SODIUM SERPL-SCNC: 143 MMOL/L (ref 136–145)
TRIGL SERPL-MCNC: 170 MG/DL (ref 0–149)
TSH SERPL-ACNC: 1.93 MIU/L (ref 0.44–3.98)
VLDL: 34 MG/DL (ref 0–40)
WBC # BLD AUTO: 7.9 X10*3/UL (ref 4.4–11.3)

## 2024-01-09 PROCEDURE — 84443 ASSAY THYROID STIM HORMONE: CPT

## 2024-01-09 PROCEDURE — 82570 ASSAY OF URINE CREATININE: CPT

## 2024-01-09 PROCEDURE — 80053 COMPREHEN METABOLIC PANEL: CPT

## 2024-01-09 PROCEDURE — 80061 LIPID PANEL: CPT

## 2024-01-09 PROCEDURE — 82306 VITAMIN D 25 HYDROXY: CPT

## 2024-01-09 PROCEDURE — 36415 COLL VENOUS BLD VENIPUNCTURE: CPT

## 2024-01-09 PROCEDURE — 83036 HEMOGLOBIN GLYCOSYLATED A1C: CPT

## 2024-01-09 PROCEDURE — 85027 COMPLETE CBC AUTOMATED: CPT

## 2024-01-09 PROCEDURE — 82043 UR ALBUMIN QUANTITATIVE: CPT

## 2024-01-09 NOTE — LETTER
January 15, 2024     Marcelle Hewitt  [address] [address]      Dear Ms. Hewitt:    Below are the results from your recent visit:      A1c at 6.2, at goal, continue current medication of Jardiance.     Borderline kidney dysfunction, pretty good for age, continue to avoid nephrotoxic medications.     Cholesterol mostly good, borderline elevated triglycerides, continue to practice healthy dietary habits including reduce carbohydrates.     Remaining labs unremarkable.           If you have any questions or concerns, please don't hesitate to call.

## 2024-01-10 ENCOUNTER — OFFICE VISIT (OUTPATIENT)
Dept: PRIMARY CARE | Facility: CLINIC | Age: 84
End: 2024-01-10
Payer: MEDICARE

## 2024-01-10 ENCOUNTER — TELEPHONE (OUTPATIENT)
Dept: PRIMARY CARE | Facility: CLINIC | Age: 84
End: 2024-01-10

## 2024-01-10 VITALS
HEART RATE: 78 BPM | BODY MASS INDEX: 36.71 KG/M2 | OXYGEN SATURATION: 95 % | DIASTOLIC BLOOD PRESSURE: 60 MMHG | WEIGHT: 187 LBS | HEIGHT: 60 IN | SYSTOLIC BLOOD PRESSURE: 138 MMHG

## 2024-01-10 DIAGNOSIS — I50.32 CHRONIC DIASTOLIC CONGESTIVE HEART FAILURE (MULTI): ICD-10-CM

## 2024-01-10 DIAGNOSIS — N18.31 STAGE 3A CHRONIC KIDNEY DISEASE (MULTI): Primary | ICD-10-CM

## 2024-01-10 DIAGNOSIS — E11.22 CHRONIC KIDNEY DISEASE DUE TO DIABETES MELLITUS (MULTI): ICD-10-CM

## 2024-01-10 DIAGNOSIS — E66.01 OBESITY, MORBID (MULTI): ICD-10-CM

## 2024-01-10 DIAGNOSIS — M35.3 POLYMYALGIA RHEUMATICA (MULTI): ICD-10-CM

## 2024-01-10 DIAGNOSIS — G95.89 LUMBAR EPIDURAL MASS (MULTI): ICD-10-CM

## 2024-01-10 DIAGNOSIS — I48.0 PAROXYSMAL ATRIAL FIBRILLATION (MULTI): ICD-10-CM

## 2024-01-10 DIAGNOSIS — M31.6 TEMPORAL ARTERITIS (MULTI): ICD-10-CM

## 2024-01-10 PROBLEM — S83.90XA SPRAIN OF KNEE: Status: ACTIVE | Noted: 2024-01-10

## 2024-01-10 PROBLEM — L03.90 CELLULITIS: Status: ACTIVE | Noted: 2024-01-10

## 2024-01-10 PROBLEM — H02.889 MEIBOMIAN GLAND DYSFUNCTION: Status: ACTIVE | Noted: 2023-11-02

## 2024-01-10 PROBLEM — M17.12 OSTEOARTHRITIS OF LEFT KNEE: Status: ACTIVE | Noted: 2024-01-10

## 2024-01-10 PROBLEM — R25.2 SPASM: Status: ACTIVE | Noted: 2024-01-10

## 2024-01-10 PROBLEM — K59.00 CONSTIPATION: Status: ACTIVE | Noted: 2024-01-10

## 2024-01-10 PROBLEM — R61 EXCESSIVE SWEATING: Status: ACTIVE | Noted: 2024-01-10

## 2024-01-10 PROBLEM — R45.89 DEPRESSED MOOD: Status: ACTIVE | Noted: 2024-01-10

## 2024-01-10 PROBLEM — I65.29 STENOSIS OF CAROTID ARTERY: Status: ACTIVE | Noted: 2022-11-01

## 2024-01-10 PROBLEM — G62.9 POLYNEUROPATHY: Status: ACTIVE | Noted: 2023-10-11

## 2024-01-10 PROBLEM — F32.A DEPRESSIVE DISORDER: Status: ACTIVE | Noted: 2024-01-10

## 2024-01-10 PROBLEM — Z96.1 PSEUDOPHAKIA: Status: ACTIVE | Noted: 2023-11-02
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
## 2024-01-10 PROBLEM — H20.9 IRITIS: Status: ACTIVE | Noted: 2019-05-06

## 2024-01-10 PROBLEM — H52.209 ASTIGMATISM: Status: ACTIVE | Noted: 2023-11-02

## 2024-01-10 PROBLEM — I25.10 ARTERIOSCLEROSIS OF CORONARY ARTERY: Status: ACTIVE | Noted: 2023-10-10

## 2024-01-10 PROBLEM — R60.9 EDEMA: Status: ACTIVE | Noted: 2024-01-10

## 2024-01-10 PROBLEM — S82.839A CLOSED FRACTURE OF DISTAL END OF FIBULA: Status: ACTIVE | Noted: 2024-01-10

## 2024-01-10 PROBLEM — Z20.822 CONTACT WITH AND (SUSPECTED) EXPOSURE TO COVID-19: Status: ACTIVE | Noted: 2023-03-18

## 2024-01-10 PROBLEM — G56.01 CARPAL TUNNEL SYNDROME OF RIGHT WRIST: Status: ACTIVE | Noted: 2023-10-11

## 2024-01-10 PROCEDURE — 1160F RVW MEDS BY RX/DR IN RCRD: CPT | Performed by: STUDENT IN AN ORGANIZED HEALTH CARE EDUCATION/TRAINING PROGRAM

## 2024-01-10 PROCEDURE — 1036F TOBACCO NON-USER: CPT | Performed by: STUDENT IN AN ORGANIZED HEALTH CARE EDUCATION/TRAINING PROGRAM

## 2024-01-10 PROCEDURE — 99214 OFFICE O/P EST MOD 30 MIN: CPT | Performed by: STUDENT IN AN ORGANIZED HEALTH CARE EDUCATION/TRAINING PROGRAM

## 2024-01-10 PROCEDURE — 3078F DIAST BP <80 MM HG: CPT | Performed by: STUDENT IN AN ORGANIZED HEALTH CARE EDUCATION/TRAINING PROGRAM

## 2024-01-10 PROCEDURE — 3075F SYST BP GE 130 - 139MM HG: CPT | Performed by: STUDENT IN AN ORGANIZED HEALTH CARE EDUCATION/TRAINING PROGRAM

## 2024-01-10 PROCEDURE — 1159F MED LIST DOCD IN RCRD: CPT | Performed by: STUDENT IN AN ORGANIZED HEALTH CARE EDUCATION/TRAINING PROGRAM

## 2024-01-10 PROCEDURE — 1126F AMNT PAIN NOTED NONE PRSNT: CPT | Performed by: STUDENT IN AN ORGANIZED HEALTH CARE EDUCATION/TRAINING PROGRAM

## 2024-01-10 PROCEDURE — G2211 COMPLEX E/M VISIT ADD ON: HCPCS | Performed by: STUDENT IN AN ORGANIZED HEALTH CARE EDUCATION/TRAINING PROGRAM

## 2024-01-10 ASSESSMENT — COLUMBIA-SUICIDE SEVERITY RATING SCALE - C-SSRS
1. IN THE PAST MONTH, HAVE YOU WISHED YOU WERE DEAD OR WISHED YOU COULD GO TO SLEEP AND NOT WAKE UP?: NO
6. HAVE YOU EVER DONE ANYTHING, STARTED TO DO ANYTHING, OR PREPARED TO DO ANYTHING TO END YOUR LIFE?: NO
2. HAVE YOU ACTUALLY HAD ANY THOUGHTS OF KILLING YOURSELF?: NO

## 2024-01-10 ASSESSMENT — ENCOUNTER SYMPTOMS
LOSS OF SENSATION IN FEET: 0
OCCASIONAL FEELINGS OF UNSTEADINESS: 0
DEPRESSION: 0

## 2024-01-10 NOTE — RESULT ENCOUNTER NOTE
A1c at 6.2, at goal, continue current medication of Jardiance.    Borderline kidney dysfunction, pretty good for age, continue to avoid nephrotoxic medications.    Cholesterol mostly good, borderline elevated triglycerides, continue to practice healthy dietary habits including reduce carbohydrates.    Remaining labs unremarkable.

## 2024-01-10 NOTE — PROGRESS NOTES
83-year-old female presenting for follow-up on multiple chronic conditions:    Chronic back pain, chronic knee pain  Has been worsening somewhat, follows with pain management, he had advised the procedure that her insurance denied.  Patient declines physical therapy, states she tried 1 or 2 sessions in the past and did not get good benefit.    A-fib, SA node dysfunction, CHF, HLD  Stable, tolerating current regimen well.  Asymptomatic.    DMII, CKD 3A, diabetic neuropathy  Stable on SGLT2 only.  A1c generally at goal.  Saw podiatry in the interval, diagnosed with diabetic neuropathy.  Stable.     Asthma  Stable without any medication    Osteoporosis  Stable, on Prolia every 6 months    12 point ROS reviewed and negative other than as stated in HPI      General: Alert, oriented, pleasant, in no acute distress  HEENT:      Head: normocephalic, atraumatic;      eyes: EOMI, no scleral icterus;   Neck: soft, supple, non-tender, no masses appreciated  CV: Heart with regular rate and rhythm, normal S1/S2, no murmurs  Lungs: CTAB without wheezing, rhonchi or rales; good respiratory effort, no increased work of breathing  Extremities: Trace-+1 edema bilaterally  Neuro: Cranial nerves grossly intact; alert and oriented  Psych: Appropriate mood and affect    # Chronic back pain #chronic knee pain  -Continue to follow with pain management  -Highly encourage trial of physical therapy, or increased home exercise, I do believe this will go a long way if she puts in the time    # A-fib #SA node dysfunction #CHF  -Sinus rhythm on auscultation, feels well, stable  -Continue metoprolol tartrate 25 mg BID, flecainide 50 mg BID, Eliquis 2.5 mg BID  -Furosemide 40 mg as needed, losartan 100 mg, Jardiance 25 mg  -Continue to follow with cardiology    #DMII #CKD 3 #Diabetic neuropathy  -A1c consistently at goal, last GFR at 56  -UTD eye exam, podiatry  -Continue Jardiance 25 mg qd  -Avoid nephrotoxic drugs, and stay adequately hydrated,  will refer to nephrology if GFR decreases below thirty    # Asthma  -Stable without any medication    #HLD  -Continue rosuvastatin 40 mg daily     #Osteoporosis  -Continue Prolia 60 mg every 6 months    F/U 6 months or sooner if indicated    Chris D'Amico, DO

## 2024-01-17 ENCOUNTER — OFFICE VISIT (OUTPATIENT)
Dept: ORTHOPEDIC SURGERY | Facility: CLINIC | Age: 84
End: 2024-01-17
Payer: MEDICARE

## 2024-01-17 DIAGNOSIS — M17.0 PRIMARY OSTEOARTHRITIS OF BOTH KNEES: Primary | ICD-10-CM

## 2024-01-17 PROCEDURE — 20610 DRAIN/INJ JOINT/BURSA W/O US: CPT | Performed by: ORTHOPAEDIC SURGERY

## 2024-01-17 PROCEDURE — 1126F AMNT PAIN NOTED NONE PRSNT: CPT | Performed by: ORTHOPAEDIC SURGERY

## 2024-01-17 PROCEDURE — 1036F TOBACCO NON-USER: CPT | Performed by: ORTHOPAEDIC SURGERY

## 2024-01-17 PROCEDURE — 1159F MED LIST DOCD IN RCRD: CPT | Performed by: ORTHOPAEDIC SURGERY

## 2024-01-17 PROCEDURE — 1160F RVW MEDS BY RX/DR IN RCRD: CPT | Performed by: ORTHOPAEDIC SURGERY

## 2024-01-17 RX ORDER — LIDOCAINE HYDROCHLORIDE 10 MG/ML
4 INJECTION INFILTRATION; PERINEURAL
Status: COMPLETED | OUTPATIENT
Start: 2024-01-17 | End: 2024-01-17

## 2024-01-17 RX ORDER — TRIAMCINOLONE ACETONIDE 40 MG/ML
1 INJECTION, SUSPENSION INTRA-ARTICULAR; INTRAMUSCULAR
Status: COMPLETED | OUTPATIENT
Start: 2024-01-17 | End: 2024-01-17

## 2024-01-17 RX ADMIN — TRIAMCINOLONE ACETONIDE 1 ML: 40 INJECTION, SUSPENSION INTRA-ARTICULAR; INTRAMUSCULAR at 13:29

## 2024-01-17 RX ADMIN — LIDOCAINE HYDROCHLORIDE 4 ML: 10 INJECTION INFILTRATION; PERINEURAL at 13:29

## 2024-01-17 ASSESSMENT — PAIN SCALES - GENERAL: PAINLEVEL_OUTOF10: 5 - MODERATE PAIN

## 2024-01-17 ASSESSMENT — PAIN - FUNCTIONAL ASSESSMENT: PAIN_FUNCTIONAL_ASSESSMENT: 0-10

## 2024-01-17 NOTE — PROGRESS NOTES
L Inj/Asp: bilateral knee on 1/17/2024 1:29 PM  Indications: pain and joint swelling  Details: 22 G needle, anterolateral approach  Medications (Right): 1 mL triamcinolone acetonide 40 mg/mL; 4 mL lidocaine 10 mg/mL (1 %)  Medications (Left): 1 mL triamcinolone acetonide 40 mg/mL; 4 mL lidocaine 10 mg/mL (1 %)    Discussion:  I discussed the conservative treatment options for knee osteoarthritis including but not limited to physical therapy, oral NSAIDS, activity and lifestyle modification, and corticosteroid injections. Pt has elected to undergo a cortisone injection today. I have explained the risk and benefits of an injection including the possibility of joint infection, bleeding, damage to cartilage, allergic reaction. Patient verbalized understanding and gave verbal consent wishes to proceed with a intra-articular cortisone injection for their knee.    Procedure:  After discussing the risk and benefits of the procedure, we proceeded with an intra-articular bilateral knee injection.    With the patient's informed verbal consent, the bilateral knees were prepped in standard sterile fashion with Chlorhexidine. The skin was then anesthetized with ethyl chloride spray and cleaned again with Chlorhexidine. The bilateral knees were then apirated/injected with a prefilled 20-gauge syringe of 40 mg Kenalog + 4 ml Lidocaine using the lateral approach without complications.  The patient tolerated this well and felt immediate initial relief of symptoms. A bandaid was applied and the patient ambulated out of the clinic on ther own accord without difficulty. Patient was instructed to avoid physical activity for 24-48 hours to prevent the knees from swelling and may ice the knees as tolerated. Patient should contact the office if any signs of of infection appear: redness, fever, chills, drainage, swelling or warmth to the knees.  Pt understands that the injections can be repeated no sooner than 3 months.      Procedure,  treatment alternatives, risks and benefits explained, specific risks discussed. Consent was given by the patient. Immediately prior to procedure a time out was called to verify the correct patient, procedure, equipment, support staff and site/side marked as required. Patient was prepped and draped in the usual sterile fashion.

## 2024-01-23 NOTE — PROGRESS NOTES
"Pharmacist Clinic: Anticoagulation and Heart Failure Management  Marcelle Hewitt was referred to the Clinical Pharmacy Team for her anticoagulation and Heart Failure management.    Referring Provider:  Afua Best    Last Appointment w/ Pharmacist: 10/24/2023  Pharmacist Name: oRxann Batres, PharmD  _______________________________________________________________________  PHARMACY ASSESSMENT    Review of Past Appointment:   - Patient reported a little bruising and denies recent hospitalizations and falls.  - Patient also reported edema in left calf (takes Lasix for swelling)    RELEVANT LAB RESULTS  Lab Results   Component Value Date    BILITOT 0.4 01/09/2024    CALCIUM 9.5 01/09/2024    CO2 23 01/09/2024     (H) 01/09/2024    CREATININE 0.99 01/09/2024    GLUCOSE 124 (H) 01/09/2024    ALKPHOS 61 01/09/2024    K 4.1 01/09/2024    PROT 6.2 (L) 01/09/2024     01/09/2024    AST 15 01/09/2024    ALT 14 01/09/2024    BUN 21 01/09/2024    ANIONGAP 16 01/09/2024    MG 2.00 03/17/2023    PHOS 2.8 03/18/2023    ALBUMIN 4.0 01/09/2024    GFRF 58 (A) 07/19/2023     Lab Results   Component Value Date    TRIG 170 (H) 01/09/2024    CHOL 137 01/09/2024    LDLCALC 60 01/09/2024    HDL 42.8 01/09/2024     No results found for: \"BMCBC\", \"CBCDIF\"   _______________________________________________________________________  ANTICOAGULATION ASSESSMENT    The ASCVD Risk score (Thomas STAPLES, et al., 2019) failed to calculate for the following reasons:    The 2019 ASCVD risk score is only valid for ages 40 to 79    DIAGNOSIS: prevention of nonvalvular atrial fibrilliation stroke and systemic embolism  - Patient is projected to be on anticoagulation indefinitely  - [id number] Score: [6] (only included if diagnosis is atrial fibrillation)   Age: [<65 (0)] [65-74 (+1)] [> 75 (+2)]: 2  Sex: [Male/Female (+1)]: 1  CHF history: [No/Yes(+1)]: 1  Hypertension history: [No/Yes(+1)]: 1  Stroke/TIA/thromboembolism history: [No/Yes(+2)]: " 0  Vascular disease history (prior MI, peripheral artery disease, aortic plaque): [No/Yes(+1)]: 0  Diabetes history: [No/Yes(+1)]: 1    CURRENT PHARMACOTHERAPY:   - Eliquis 2.5 mg   - 82 yo  - 84.8 kg  - Scr: 0.99  - eGFR: 56    UPDATE ON PHARMACOTHERAPY:   Affordability/Accessibility: Patient receives Eliquis through  PAP financial assistance  Adherence/Organization: Patient reported sometimes she forgets to take evening dose of Eliquis.     Adverse Effects: Patient denied adverse effects associated with Eliquis    Recent Hospitalizations: None  Recent Falls/Trauma: Patient reported falling yesterday on her back but denied pain or hitting her head.  Changes in Tobacco or Alcohol Intake: No alcohol intake _______________________________________________________________________  CHF ASSESSMENT     Symptom/Staging:  -Most recent ejection fraction: 55-60%  -NYHA Stage: Unknown    Guideline-Directed Medical Therapy:  -ARNI: No   -If no, then ACEi/ARB?: Yes, describe: Losartan 100 mg  -Beta Blocker: Yes, describe: Metoprolol tartrate 25 mg  -MRA: No  -SGLT2i: Yes, describe: Jardiance 25 mg    Secondary Prevention:  -The ASCVD Risk score (Thomas DK, et al., 2019) failed to calculate for the following reasons:    The 2019 ASCVD risk score is only valid for ages 40 to 79   -Aspirin 81mg? yes  -Statin?: Yes, describe: Rosuvastatin 40 mg  -HTN?: Yes, describe: losartan 100 mg , metoprolol tartrate 25 mg    CURRENT PHARMACOTHERAPY:   - Jardiance 25 mg once daily    UPDATE ON PHARMACOTHERAPY:   Affordability/Accessibility: Patient receives Jardiance through  PAP financial assistance   Adherence/Organization: Patient is compliant with Jardiance  Adverse Effects: Patient reports frequent urination    Monitoring Weights at Home: No  Home Weight Recordings: N/a    Past In Office Weight Readings: 84.8 kg    Monitoring Blood Pressure at Home: No  Home BP Recordings: N/a    Past In Office BP Readings: 138/60    HEALTH  MANAGEMENT    Maintaining fluid restriction (<2 L/day): N/A  Edema/swelling: Yes, little swelling in left leg (take Lasix as needed; patient reported medications helps a lot)  Shortness of breath: Yes, only with exertion  Trouble sleeping/lying down: No  Dry/hacking cough: No  Recent Hospitalizations: No    DISCUSSION/NOTES  - Patient reported sometimes forgetting to take evening dose of Eliquis. Patient denied adverse effects associated with Eliquis. Patient stated she fell yesterday on her back but did not get injured or hit her head.     - Patient reported little swelling in her left leg and is taking Lasix as needed (only needs to take Lasix once a month).   ______________________________________________________________________  PATIENT EDUCATION/GOALS  - Counseled patient on MOA, expectations, duration of therapy, contraindications, administration, and monitoring parameters  - Counseled patient of side effects that are indicative of bleeding such as dark tarry stool, unexplainable bruising, or vomiting up a coffee ground like substance  - Counseled patient on lifestyle modifications that can decrease your risk of having complications (smoking cessation, losing weight, daily weights, vaccines)  - Counseled patient on fluid intake and weight management. Recommended to not consume more than 2 liters of fliuids per day. If they have gained more than 2-3 pounds within a 24 hours period (or 5 pounds in a week), contact their cardiologist  - Answered all patient questions and concern  _______________________________________________________________________  RECOMMENDATIONS/PLAN  1. Continue Eliquis 2.5 mg BID (provider prefers lower dose due to kidney function)  2. Continue Jardiance 25 mg once daily    Next Cardiology Appointment: 2/8/2024  Clinical Pharmacist follow up: 4/24/2024  VAF/Application Expiration: Yes    Date: 8/4/2024  Type of Encounter: Leeann Shore PharmD  PGY-1 Pharmacy Resident    Verbal  consent to manage patient's drug therapy was obtained from the patient . They were informed they may decline to participate or withdraw from participation in pharmacy services at any time.    Continue all meds under the continuation of care with the referring provider and clinical pharmacy team.

## 2024-01-24 ENCOUNTER — TELEMEDICINE (OUTPATIENT)
Dept: PHARMACY | Facility: HOSPITAL | Age: 84
End: 2024-01-24
Payer: MEDICARE

## 2024-01-24 DIAGNOSIS — I48.91 ATRIAL FIBRILLATION, UNSPECIFIED TYPE (MULTI): Primary | ICD-10-CM

## 2024-01-24 DIAGNOSIS — I50.32 CHRONIC DIASTOLIC CONGESTIVE HEART FAILURE (MULTI): ICD-10-CM

## 2024-01-24 PROCEDURE — RXMED WILLOW AMBULATORY MEDICATION CHARGE

## 2024-01-24 NOTE — Clinical Note
Hey Leonie! My resident did a follow up with Marcelle regarding her anticoag and HF management. - Patient reported sometimes forgetting to take evening dose of Eliquis. Patient denied adverse effects associated with Eliquis. Patient stated she fell yesterday on her back but did not get injured or hit her head.    - Patient reported little swelling in her left leg and is taking Lasix as needed (only needs to take Lasix once a month).

## 2024-01-26 ENCOUNTER — PHARMACY VISIT (OUTPATIENT)
Dept: PHARMACY | Facility: CLINIC | Age: 84
End: 2024-01-26
Payer: MEDICARE

## 2024-02-07 PROBLEM — N18.31 STAGE 3A CHRONIC KIDNEY DISEASE (MULTI): Status: ACTIVE | Noted: 2024-02-07

## 2024-02-08 ENCOUNTER — OFFICE VISIT (OUTPATIENT)
Dept: CARDIOLOGY | Facility: CLINIC | Age: 84
End: 2024-02-08
Payer: MEDICARE

## 2024-02-08 VITALS
HEIGHT: 60 IN | DIASTOLIC BLOOD PRESSURE: 88 MMHG | OXYGEN SATURATION: 95 % | WEIGHT: 194.19 LBS | SYSTOLIC BLOOD PRESSURE: 128 MMHG | HEART RATE: 79 BPM | BODY MASS INDEX: 38.12 KG/M2

## 2024-02-08 DIAGNOSIS — R06.09 DYSPNEA ON EXERTION: ICD-10-CM

## 2024-02-08 DIAGNOSIS — I50.32 CHRONIC DIASTOLIC CONGESTIVE HEART FAILURE (MULTI): Primary | ICD-10-CM

## 2024-02-08 PROCEDURE — 3074F SYST BP LT 130 MM HG: CPT | Performed by: INTERNAL MEDICINE

## 2024-02-08 PROCEDURE — RXMED WILLOW AMBULATORY MEDICATION CHARGE

## 2024-02-08 PROCEDURE — 3079F DIAST BP 80-89 MM HG: CPT | Performed by: INTERNAL MEDICINE

## 2024-02-08 PROCEDURE — 99213 OFFICE O/P EST LOW 20 MIN: CPT | Performed by: INTERNAL MEDICINE

## 2024-02-08 PROCEDURE — 1159F MED LIST DOCD IN RCRD: CPT | Performed by: INTERNAL MEDICINE

## 2024-02-08 PROCEDURE — 1126F AMNT PAIN NOTED NONE PRSNT: CPT | Performed by: INTERNAL MEDICINE

## 2024-02-08 PROCEDURE — 1160F RVW MEDS BY RX/DR IN RCRD: CPT | Performed by: INTERNAL MEDICINE

## 2024-02-08 PROCEDURE — 1036F TOBACCO NON-USER: CPT | Performed by: INTERNAL MEDICINE

## 2024-02-08 ASSESSMENT — PAIN SCALES - GENERAL: PAINLEVEL: 0-NO PAIN

## 2024-02-08 ASSESSMENT — PATIENT HEALTH QUESTIONNAIRE - PHQ9
10. IF YOU CHECKED OFF ANY PROBLEMS, HOW DIFFICULT HAVE THESE PROBLEMS MADE IT FOR YOU TO DO YOUR WORK, TAKE CARE OF THINGS AT HOME, OR GET ALONG WITH OTHER PEOPLE: SOMEWHAT DIFFICULT
2. FEELING DOWN, DEPRESSED OR HOPELESS: SEVERAL DAYS
SUM OF ALL RESPONSES TO PHQ9 QUESTIONS 1 AND 2: 2
1. LITTLE INTEREST OR PLEASURE IN DOING THINGS: SEVERAL DAYS

## 2024-02-08 ASSESSMENT — ENCOUNTER SYMPTOMS
LOSS OF SENSATION IN FEET: 1
DEPRESSION: 1
OCCASIONAL FEELINGS OF UNSTEADINESS: 0

## 2024-02-08 NOTE — PATIENT INSTRUCTIONS
You were seen in the [facility] for your shortness of breath and leg swelling due to chronic diastolic heart failure.     Your symptoms are partly due to diastolic heart failure. Diastolic heart failure happens when the heart muscle gets stiff and relaxes slowly between heart beats as it fills with blood. This causes the body to hold on to too much salt and fluid making your shortness of breath worse. Your medical conditions of high blood pressure, type 2 diabetes, atrial fibrillation, and sleep apnea on CPAP have contributed to you developing diastolic heart failure.     Continue Jardiance to 25 mg a day. This medication is a SGLT2 inhibitor and can help reduce heart attack risk by up to 20% and reduce your risk of being admitted to the hospital because of diastolic heart failure by 50%. This medication works by forcing the kidneys to lose  gram of blood sugar a day which helps lower blood sugar levels, blood pressure, and cholesterol levels. Jardiance will help treat your diastolic heart failure by lowering your fluid level inside your heart and blood vessels and help reduce your ankle swelling. This will likely help with your shortness of breath as well and was first approved as a diabetes medication to control blood sugar.      Exercise Prescription:  Do aerobic exercise in intervals (3 minutes of exercise, 3 minutes of rest) for 4 cycles each day starting 3 times a week and build up to 5 times a week.We are doing interval therapy because the pressure inside your heart builds up over several minutes but returns to normal after resting for 3 minutes. Aerobic exercise can help strengthen your leg muscles and help treat your knee arthritis which is sending some shortness of breath signals to your heart.     Continue take your morning furosemide 40 mg tablet 30 minutes after taking Jardiance 25 mg. You are using furosemide 40 mg about 2 times a week for leg swelling. Take that  morning if you gain 2 pounds overnight or 3 pounds over 2 nights.     Follow up with Dr. Zhou in 12 months.

## 2024-02-08 NOTE — PROGRESS NOTES
Subjective   Marcelle Hewitt is a 83 y.o. female who presents to the [facility] for follow up of chronic exertional dyspnea. Last seen in February 2023.     Since our last visit, Ms. Hewitt has had a small improvement in exertional dyspnea taking Jardiance 25 mg. Initially started on Jardiance 10 mg a day at start of 2022. 10 lb weight loss since starting SGLT2i.     Less pedal edema (now trace). Using furosemide 40 mg tablets 2x/week now instead of daily. Notes dyspnea worst after sitting for a long time in correlation with arthritis symptomatology. Improves after moving for a minute or 2.     No active cardiac symptoms of chest pain, PND, orthopnea, ARIANA, palpitations, syncope, or claudication. Still CANTRELL limited by 1 flight of stairs Found to be in atrial fibrillation on device check in spring 2023 and now taking Eliquis for CVA prevention.    Dyspnea History:  She has a several year history of worsening exertional dyspnea. Now CANTRELL with 1 flight of stairs or 1-2 blocks of walking. Dyspnea worst with bending over. Progressively worsened up until February 2022 visit with Dr. Lal and myself at [facility] visit. 1/3/2022 V/Q scan showed low probability for PE. 2022 CT chest showed no acute cardiopulm process, no IPF, atelectasis, and stable lung nodule per report.     Past Medical History:  1. Chronic diastolic heart failure  2. Hypertension  3. Dyslipidemia  4. Type 2 diabetes mellitus  5. Dyslipidemia  6. ALEXANDER on CPAP x 10 years  7. 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 Follow by Dr. White.     Social History:  Never a smoker     Family History:  No premature CAD in 1st degree relatives ( 55 years of age for male relatives, 65 years of age for female relatives)     Review of Systems    A 14 point review of systems was asked. All questions were negative except for pertinent positives listed in the HPI.     Current Outpatient Medications on File Prior to Visit    Medication Sig Dispense Refill    apixaban (Eliquis) 2.5 mg tablet Take 1 tablet (2.5 mg) by mouth 2 times a day. 180 tablet 3    aspirin 81 mg EC tablet Take 1 tablet (81 mg) by mouth once daily.      calcium phosphate-vitamin D3 250 mg-12.5 mcg (500 unit) tablet,chewable TAKE BY MOUTH AS DIRECTED      cholecalciferol (Vitamin D-3) 50 mcg (2,000 unit) capsule Take 1 capsule (50 mcg) by mouth once daily.      denosumab (Prolia) 60 mg/mL syringe 60 mg subq every 6 months      diclofenac sodium (Voltaren) 1 % gel gel       empagliflozin (Jardiance) 25 mg Take 1 tablet (25 mg) by mouth once daily. 90 tablet 3    esomeprazole (NexIUM) 20 mg DR capsule Take 1 capsule (20 mg) by mouth once daily.      flecainide (Tambocor) 50 mg tablet Take 1 tablet (50 mg) by mouth 2 times a day.      furosemide (Lasix) 40 mg tablet Take 1 tablet (40 mg) by mouth once daily. PRN swelling      losartan (Cozaar) 100 mg tablet Take 1 tablet (100 mg) by mouth once daily.      metoprolol tartrate (Lopressor) 25 mg tablet Take 1 tablet (25 mg) by mouth 2 times a day.      rosuvastatin (Crestor) 40 mg tablet Take 1 tablet (40 mg) by mouth once daily. 90 tablet 3    lubricating eye drops ophthalmic solution 1 drop if needed for dry eyes.       No current facility-administered medications on file prior to visit.      Objective   Physical Exam  BP Readings from Last 3 Encounters:   02/08/24 128/88   01/10/24 138/60   11/28/23 119/57      Wt Readings from Last 3 Encounters:   02/08/24 88.1 kg (194 lb 3 oz)   01/10/24 84.8 kg (187 lb)   11/28/23 85.3 kg (188 lb)      BMI: Estimated body mass index is 37.92 kg/m² as calculated from the following:    Height as of this encounter: 1.524 m (5').    Weight as of this encounter: 88.1 kg (194 lb 3 oz).  BSA: Estimated body surface area is 1.93 meters squared as calculated from the following:    Height as of this encounter: 1.524 m (5').    Weight as of this encounter: 88.1 kg (194 lb 3 oz).    General: no  acute distress  HEENT: EOMI, no scleral icterus.  Lungs: Clear to auscultation bilaterally without wheezing, rales, or rhonchi.  Cardiovascular: Regular rhythm and rate. Normal S1 and S2. No murmurs, rubs, or gallops are appreciated. JVP normal.  Abdomen: Soft, nontender, nondistended. Bowel sounds present.  Extremities: Warm and well perfused with equal 2+ pulses bilaterally.  No edema present.  Neurologic: Alert and oriented x3.    I have personally reviewed the following images and laboratory findings:  Last echocardiogram:  2021 echocardiogram: LV EF 60-65%, no LVH (LVMI 86 gm/m2), indeterminate diastology (E/e' 15), normal LA size (THANIA 28 ml/m2), normal RV/RA, DCPM wires seen, no AI, no MR, mild TR, RVSP 45 mm Hg (RAP 3 mm Hg).    Last cath / stress test:  2018 [facility] ([place], [place]): Normal coronary arteries, LV EF 60% by V-gram, LVEDP 27 mm Hg.    Most recent EC2023 ECG: Atrial paced at 78 bpm. Personally reviewed in office.     Assessment/Plan   1. Chronic dyspnea:  Multi-factorial dyspnea due to the following causes: Chronic diastolic heart failure, hypertension, type 2 diabetes, skeletal muscle deconditioning, osteoarthritis, ALEXANDER.     She had chronic diastolic heart failure on review of prior C in  (LVEDP 27 mm Hg) and  echocardiogram (E/e' 15) indicating increase LV filling pressure. Borderline type 2 diabetes diagnosis (A1c 6.8%).     Continue Jardiance 25 mg a day. Had 10-15 lb weight loss starting SGLT2i and fluid levels are now euvolemic on exam today. Taking furosemide 40 mg about 2x/week. Can take PRN for 2 lb weight gain or increase in ARIANA that morning.     Discussed interval exercise program today. Movement for 3-4 minutes and then rest 3-4 minutes. Her knee arthritis is contributing dyspnea signaling to her brain along with muscle deconditioning signalling. Exercise training will help address both of these dyspnea contributors.      Instructed patient to check home BP trend with goal 120-130 mm Hg. Blood pressure at goal today. Increase aerobic exercise activity for blood pressure control and biofeedback training to reduce exertional dyspnea perception.     Follow up with Dr. Zhou in 12 months       SIGNATURE: Jeison Zhou MD PATIENT NAME: Marcelle Hewitt   DATE/TIME: February 8, 2024 11:19 AM MRN: [id number]

## 2024-02-12 ENCOUNTER — PHARMACY VISIT (OUTPATIENT)
Dept: PHARMACY | Facility: CLINIC | Age: 84
End: 2024-02-12
Payer: MEDICARE

## 2024-02-12 ENCOUNTER — TELEPHONE (OUTPATIENT)
Dept: PRIMARY CARE | Facility: CLINIC | Age: 84
End: 2024-02-12
Payer: MEDICARE

## 2024-02-12 DIAGNOSIS — M81.0 OSTEOPOROSIS, UNSPECIFIED OSTEOPOROSIS TYPE, UNSPECIFIED PATHOLOGICAL FRACTURE PRESENCE: Primary | ICD-10-CM

## 2024-02-14 ENCOUNTER — APPOINTMENT (OUTPATIENT)
Dept: CARDIOLOGY | Facility: CLINIC | Age: 84
End: 2024-02-14
Payer: MEDICARE

## 2024-02-15 ENCOUNTER — LAB (OUTPATIENT)
Dept: LAB | Facility: LAB | Age: 84
End: 2024-02-15
Payer: MEDICARE

## 2024-02-15 DIAGNOSIS — M81.0 OSTEOPOROSIS, UNSPECIFIED OSTEOPOROSIS TYPE, UNSPECIFIED PATHOLOGICAL FRACTURE PRESENCE: ICD-10-CM

## 2024-02-15 LAB
ALBUMIN SERPL BCP-MCNC: 4 G/DL (ref 3.4–5)
ANION GAP SERPL CALC-SCNC: 15 MMOL/L (ref 10–20)
BUN SERPL-MCNC: 26 MG/DL (ref 6–23)
CALCIUM SERPL-MCNC: 9.3 MG/DL (ref 8.6–10.6)
CHLORIDE SERPL-SCNC: 108 MMOL/L (ref 98–107)
CO2 SERPL-SCNC: 25 MMOL/L (ref 21–32)
CREAT SERPL-MCNC: 1.21 MG/DL (ref 0.5–1.05)
EGFRCR SERPLBLD CKD-EPI 2021: 44 ML/MIN/1.73M*2
GLUCOSE SERPL-MCNC: 103 MG/DL (ref 74–99)
PHOSPHATE SERPL-MCNC: 3.8 MG/DL (ref 2.5–4.9)
POTASSIUM SERPL-SCNC: 4.3 MMOL/L (ref 3.5–5.3)
SODIUM SERPL-SCNC: 144 MMOL/L (ref 136–145)

## 2024-02-15 PROCEDURE — 36415 COLL VENOUS BLD VENIPUNCTURE: CPT

## 2024-02-15 PROCEDURE — 80069 RENAL FUNCTION PANEL: CPT

## 2024-02-21 DIAGNOSIS — M81.0 OSTEOPOROSIS, UNSPECIFIED OSTEOPOROSIS TYPE, UNSPECIFIED PATHOLOGICAL FRACTURE PRESENCE: Primary | ICD-10-CM

## 2024-02-21 RX ORDER — ALBUTEROL SULFATE 0.83 MG/ML
3 SOLUTION RESPIRATORY (INHALATION) AS NEEDED
Status: CANCELLED | OUTPATIENT
Start: 2024-02-22

## 2024-02-21 RX ORDER — EPINEPHRINE 0.3 MG/.3ML
0.3 INJECTION SUBCUTANEOUS EVERY 5 MIN PRN
Status: CANCELLED | OUTPATIENT
Start: 2024-02-22

## 2024-02-21 RX ORDER — DIPHENHYDRAMINE HYDROCHLORIDE 50 MG/ML
50 INJECTION INTRAMUSCULAR; INTRAVENOUS AS NEEDED
Status: CANCELLED | OUTPATIENT
Start: 2024-02-22

## 2024-02-21 RX ORDER — FAMOTIDINE 10 MG/ML
20 INJECTION INTRAVENOUS ONCE AS NEEDED
Status: CANCELLED | OUTPATIENT
Start: 2024-02-22

## 2024-02-22 ENCOUNTER — APPOINTMENT (OUTPATIENT)
Dept: INFUSION THERAPY | Facility: CLINIC | Age: 84
End: 2024-02-22
Payer: MEDICARE

## 2024-02-27 ENCOUNTER — DOCUMENTATION (OUTPATIENT)
Dept: INFUSION THERAPY | Facility: CLINIC | Age: 84
End: 2024-02-27

## 2024-02-27 ENCOUNTER — OFFICE VISIT (OUTPATIENT)
Dept: CARDIOLOGY | Facility: CLINIC | Age: 84
End: 2024-02-27
Payer: MEDICARE

## 2024-02-27 ENCOUNTER — TELEPHONE (OUTPATIENT)
Dept: PRIMARY CARE | Facility: CLINIC | Age: 84
End: 2024-02-27
Payer: MEDICARE

## 2024-02-27 VITALS
SYSTOLIC BLOOD PRESSURE: 125 MMHG | BODY MASS INDEX: 39.01 KG/M2 | OXYGEN SATURATION: 94 % | WEIGHT: 198.7 LBS | HEART RATE: 75 BPM | HEIGHT: 60 IN | DIASTOLIC BLOOD PRESSURE: 81 MMHG

## 2024-02-27 DIAGNOSIS — I10 HYPERTENSION, UNSPECIFIED TYPE: Primary | ICD-10-CM

## 2024-02-27 PROCEDURE — 99214 OFFICE O/P EST MOD 30 MIN: CPT | Performed by: NURSE PRACTITIONER

## 2024-02-27 PROCEDURE — 1126F AMNT PAIN NOTED NONE PRSNT: CPT | Performed by: NURSE PRACTITIONER

## 2024-02-27 PROCEDURE — 3074F SYST BP LT 130 MM HG: CPT | Performed by: NURSE PRACTITIONER

## 2024-02-27 PROCEDURE — 1159F MED LIST DOCD IN RCRD: CPT | Performed by: NURSE PRACTITIONER

## 2024-02-27 PROCEDURE — 3079F DIAST BP 80-89 MM HG: CPT | Performed by: NURSE PRACTITIONER

## 2024-02-27 PROCEDURE — 1036F TOBACCO NON-USER: CPT | Performed by: NURSE PRACTITIONER

## 2024-02-27 PROCEDURE — 1160F RVW MEDS BY RX/DR IN RCRD: CPT | Performed by: NURSE PRACTITIONER

## 2024-02-27 ASSESSMENT — ENCOUNTER SYMPTOMS
DEPRESSION: 0
CONSTITUTIONAL NEGATIVE: 1
LOSS OF SENSATION IN FEET: 1
NEUROLOGICAL NEGATIVE: 1
MUSCULOSKELETAL NEGATIVE: 1
RESPIRATORY NEGATIVE: 1
DYSPNEA ON EXERTION: 1
GASTROINTESTINAL NEGATIVE: 1
OCCASIONAL FEELINGS OF UNSTEADINESS: 1

## 2024-02-27 ASSESSMENT — PAIN SCALES - GENERAL: PAINLEVEL: 0-NO PAIN

## 2024-02-27 NOTE — PROGRESS NOTES
Chief Complaint:   Follow-up    History Of Present Illness:    .Ms Hewitt returns in follow up.  She has almonte, but went to dyspnea clinic without findings.  Denies chest pain, sob, palpitations or pedal edema.           Last Recorded Vitals:  Blood pressure 125/81, pulse 75, height 1.524 m (5'), weight 90.1 kg (198 lb 11.2 oz), SpO2 94 %.     Past Medical History:  Past Medical History:   Diagnosis Date    Abnormal finding of blood chemistry, unspecified 07/08/2022    Abnormal blood chemistry    Anesthesia of skin 11/12/2020    Numbness of foot    Bilateral primary osteoarthritis of hip 03/01/2021    Osteoarthritis, hip, bilateral    Dermatochalasis of unspecified eye, unspecified eyelid 05/06/2019    Dermatochalasis    Dry eye syndrome of bilateral lacrimal glands 03/10/2022    Dry eyes    Dry eye syndrome of unspecified lacrimal gland 04/29/2016    Dry eye syndrome    Encounter for prophylactic measures, unspecified 11/12/2020    Need for prophylactic measure    Encounter for screening mammogram for malignant neoplasm of breast     Visit for screening mammogram    Headache, unspecified 07/02/2021    Morning headache    History of falling 07/12/2017    History of fall    Keratoconjunctivitis sicca, not specified as Sjogren's, bilateral 03/10/2022    Keratoconjunctivitis sicca of both eyes not due to Sjogren's syndrome    Meibomian gland dysfunction left eye, upper and lower eyelids 03/10/2022    Meibomian gland dysfunction (MGD) of upper and lower eyelid of left eye    Meibomian gland dysfunction right eye, upper and lower eyelids 03/10/2022    Meibomian gland dysfunction (MGD) of upper and lower eyelid of right eye    Menopausal and female climacteric states 04/29/2016    Postmenopausal disorder    Morbid (severe) obesity due to excess calories (CMS/LTAC, located within [facility]) 07/08/2022    Class 2 severe obesity with serious comorbidity in adult    Myopia, bilateral 03/10/2022    Myopia with presbyopia of both eyes    Myopia, right  eye 03/10/2022    Myopia of right eye    Nonexudative age-related macular degeneration, bilateral, early dry stage 05/06/2019    Early dry stage nonexudative age-related macular degeneration of both eyes    Obesity, unspecified 12/02/2020    Obesity (BMI 30-39.9)    Obstructive sleep apnea (adult) (pediatric) 09/21/2017    ALEXANDER on CPAP    Other abnormalities of breathing 12/20/2021    Difficulty breathing    Other conditions influencing health status     DEXA Body Composition Study    Other conditions influencing health status     History of pregnancy    Other fracture of upper and lower end of left fibula, subsequent encounter for closed fracture with routine healing 06/04/2019    Closed fracture of distal end of left fibula with routine healing, unspecified fracture morphology, subsequent encounter    Other headache syndrome 07/13/2016    Other headache syndrome    Other secondary cataract, unspecified eye     PCO (posterior capsular opacification)    Other specified abnormal findings of blood chemistry 04/08/2020    Elevated serum creatinine    Other symptoms and signs involving the musculoskeletal system 09/08/2021    Weakness of both lower extremities    Other vitreous opacities, unspecified eye     Floaters    Pain in leg, unspecified 11/13/2019    Leg pain    Pain in unspecified limb 09/14/2017    Limb pain    Personal history of diseases of the skin and subcutaneous tissue 04/11/2019    History of cellulitis    Personal history of other diseases of the musculoskeletal system and connective tissue 07/25/2017    History of neck pain    Personal history of other diseases of the musculoskeletal system and connective tissue 07/12/2017    History of muscle spasm    Personal history of other diseases of the musculoskeletal system and connective tissue 06/27/2019    History of osteoporosis    Personal history of other diseases of the musculoskeletal system and connective tissue 03/02/2021    History of spinal  stenosis    Personal history of other diseases of the nervous system and sense organs 03/10/2022    History of blepharitis    Personal history of other diseases of the nervous system and sense organs 11/16/2017    History of sciatica    Personal history of other diseases of the nervous system and sense organs 04/17/2019    History of iritis    Personal history of other drug therapy 10/06/2021    History of influenza vaccination    Personal history of other medical treatment 02/22/2021    History of screening mammography    Personal history of other specified conditions 10/06/2021    History of excessive sweating    Personal history of other specified conditions 02/15/2022    History of headache    Personal history of other specified conditions 12/08/2020    History of shortness of breath    Personal history of other specified conditions 12/19/2017    History of urinary frequency    Personal history of other specified conditions 04/03/2018    History of edema    Personal history of other specified conditions 07/08/2022    History of edema    Personal history of other specified conditions 04/12/2019    History of chronic pain    Personal history of urinary (tract) infections 07/15/2021    History of urinary tract infection    Polyosteoarthritis, unspecified 11/12/2020    Osteoarthritis of multiple joints    Presence of intraocular lens     Presence of artificial intra-ocular lens    Presence of intraocular lens 03/10/2022    Pseudophakia of both eyes    Presence of spectacles and contact lenses     Wears eyeglasses    Shortness of breath 06/30/2022    SOB (shortness of breath) on exertion    Spinal stenosis, lumbar region without neurogenic claudication 02/15/2022    Lumbar canal stenosis    Unilateral primary osteoarthritis, left knee 05/07/2020    Primary osteoarthritis of left knee    Unspecified disorder of refraction 03/10/2022    Refractive error    Unspecified epiphora, unspecified side     Eye tearing     Urinary tract infection, site not specified 12/19/2017    Recurrent UTI        Past Surgical History:  Past Surgical History:   Procedure Laterality Date    CARDIAC PACEMAKER PLACEMENT  09/17/2021    Pacemaker Permanent Placement    CATARACT EXTRACTION  11/15/2013    Cataract Surgery    OTHER SURGICAL HISTORY  06/04/2019    Tonsillectomy       Social History:  Social History     Socioeconomic History    Marital status:      Spouse name: None    Number of children: None    Years of education: None    Highest education level: None   Occupational History    None   Tobacco Use    Smoking status: Never    Smokeless tobacco: Never   Vaping Use    Vaping Use: Never used   Substance and Sexual Activity    Alcohol use: Never    Drug use: Never    Sexual activity: None   Other Topics Concern    None   Social History Narrative    None     Social Determinants of Health     Financial Resource Strain: Not on file   Food Insecurity: Not on file   Transportation Needs: Not on file   Physical Activity: Not on file   Stress: Not on file   Social Connections: Not on file   Intimate Partner Violence: Not on file   Housing Stability: Not on file       Family History:  Family History   Problem Relation Name Age of Onset    Heart disease Mother      Sleep apnea Mother      Heart disease Father      Sleep apnea Father      Breast cancer Sister      Diabetes Sister      Hypertension Sister      Multiple sclerosis Sister      Strabismus Child           Allergies:  Niacin, Pregabalin, and Sulfa (sulfonamide antibiotics)    Outpatient Medications:  Current Outpatient Medications   Medication Sig Dispense Refill    apixaban (Eliquis) 2.5 mg tablet Take 1 tablet (2.5 mg) by mouth 2 times a day. 180 tablet 3    aspirin 81 mg EC tablet Take 1 tablet (81 mg) by mouth once daily.      calcium phosphate-vitamin D3 250 mg-12.5 mcg (500 unit) tablet,chewable TAKE BY MOUTH AS DIRECTED      cholecalciferol (Vitamin D-3) 50 mcg (2,000 unit) capsule  Take 1 capsule (50 mcg) by mouth once daily.      denosumab (Prolia) 60 mg/mL syringe 60 mg subq every 6 months      diclofenac sodium (Voltaren) 1 % gel gel       empagliflozin (Jardiance) 25 mg Take 1 tablet (25 mg) by mouth once daily. 90 tablet 3    esomeprazole (NexIUM) 20 mg DR capsule Take 1 capsule (20 mg) by mouth once daily.      flecainide (Tambocor) 50 mg tablet Take 1 tablet (50 mg) by mouth 2 times a day.      furosemide (Lasix) 40 mg tablet Take 1 tablet (40 mg) by mouth once daily. PRN swelling      losartan (Cozaar) 100 mg tablet Take 1 tablet (100 mg) by mouth once daily.      lubricating eye drops ophthalmic solution 1 drop if needed for dry eyes.      metoprolol tartrate (Lopressor) 25 mg tablet Take 1 tablet (25 mg) by mouth 2 times a day.      rosuvastatin (Crestor) 40 mg tablet Take 1 tablet (40 mg) by mouth once daily. 90 tablet 3     No current facility-administered medications for this visit.        Physical Exam:  Cardiovascular:      PMI at left midclavicular line. Normal rate. Regular rhythm. Normal S1. Normal S2.       Murmurs: There is no murmur.      No gallop.  No click. No rub.   Pulses:     Intact distal pulses.   Edema:     Peripheral edema absent.         ROS:  Review of Systems   Constitutional: Negative.   Cardiovascular:  Positive for dyspnea on exertion.   Respiratory: Negative.     Skin: Negative.    Musculoskeletal: Negative.    Gastrointestinal: Negative.    Genitourinary: Negative.    Neurological: Negative.           Last Labs:  CBC -  Lab Results   Component Value Date    WBC 7.9 01/09/2024    HGB 12.6 01/09/2024    HCT 37.0 01/09/2024    MCV 86 01/09/2024     01/09/2024       CMP -  Lab Results   Component Value Date    CALCIUM 9.3 02/15/2024    PHOS 3.8 02/15/2024    PROT 6.2 (L) 01/09/2024    ALBUMIN 4.0 02/15/2024    AST 15 01/09/2024    ALT 14 01/09/2024    ALKPHOS 61 01/09/2024    BILITOT 0.4 01/09/2024       LIPID PANEL -   Lab Results   Component Value  Date    CHOL 137 01/09/2024    TRIG 170 (H) 01/09/2024    HDL 42.8 01/09/2024    CHHDL 3.2 01/09/2024    LDLF 62 07/19/2023    VLDL 34 01/09/2024    NHDL 94 01/09/2024       RENAL FUNCTION PANEL -   Lab Results   Component Value Date    GLUCOSE 103 (H) 02/15/2024     02/15/2024    K 4.3 02/15/2024     (H) 02/15/2024    CO2 25 02/15/2024    ANIONGAP 15 02/15/2024    BUN 26 (H) 02/15/2024    CREATININE 1.21 (H) 02/15/2024    CALCIUM 9.3 02/15/2024    PHOS 3.8 02/15/2024    ALBUMIN 4.0 02/15/2024        Lab Results   Component Value Date     (H) 03/17/2023    HGBA1C 6.2 (H) 01/09/2024         Assessment/Plan   Problem List Items Addressed This Visit    None      1. Documented normal coronary arteries by cardiac cath 11/1994 and 10/2001 and 1/2018. The patient again had cardiac cath in [place] 01/2018. Normal coronary arteries. Echocardiogram was performed on 10/05/2016 showing a preserved LV ejection fraction of 55â€“60 percent with mild hypokinesis of the anteroseptal wall presumably due to pacemaker activity. There was however evidence of moderate pulmonary hypertension with moderate 2+ tricuspid valve regurgitation.  Echo 04/2022  CONCLUSIONS:   1. The left ventricular systolic function is normal with a 55-60% estimated ejection fraction.   2. Abnormal septal motion consistent with RV pacemaker.   3. There is mild hypokinesis and dyssynchrony of the anteroseptal wall.   4. The left atrium is mild to moderately dilated.   5. Mildly elevated RVSP.   6. There is mild to moderate tricuspid regurgitation.   7. The estimated PASP is 45 mmHg.   8. There is plaque visualized in the ascending aorta.   2. Positive family history of CAD.  3. History of rate related left bundle branch block conduction delay.  4. Status post dual-chamber permanent pacemaker insertion 08/14/2008 for cardiogenic syncope with pulse generator replacement 08/12/2019. The patient did have a recent device check on 04/06/2020 showing  right atrial pacing 87% right ventricular pacing only 1%. There were no ventricular high rate episodes. There were 3 episodes of either atrial tachycardia or atrial fibrillation all lasting less than 1 minute. The patient did have a more recent pacemaker interrogation on 8/27/2020 done remotely. This demonstrated 100% right atrial pacing with 0% right ventricular pacemaking activity. The estimated battery life is 12 years. There were no episodes of atrial tachycardia or atrial fibrillation on Cardizem  mg daily.  The patient has had a more recent device interrogation 6/7/2023 estimated battery life 9.5 years.  5 episodes of high atrial rates longest lasting greater than 48 hours on 3/13/2023.  Total burden of atrial fibrillation is 2%.  She is atrial pacing 92% ventricular pacing 1%.  The patient is presently on flecainide 50 mg twice daily Eliquis 2.5 mg twice daily and metoprolol 25 mg twice daily.  5. Hypertension. The patient will remain on the same dose of the Cardizem  mg daily and Losartan 50 mg daily. If blood pressure elevates in the future could increase the dose of losartan.  6. Hyperlipidemia. She is presently on rosuvastatin 40 mg daily and her most recent lab work from 04/01/2020 included a cholesterol of 130 LDL 63 HDL 40 triglyceride 132. The CBC and SMA panels were normal with creatinine of 1.11. The patient did have repeat lab work performed on 11/12/2020 with cholesterol 156 LDL 77 triglyceride 100 HDL 58. The liver function panel was normal. Creatinine was 1.22 and glycohemoglobin 6.4%. Vitamin D level was 30. The CBC was normal.  The patient had recent lab work 7/19/2023 with cholesterol 137 LDL 62 HDL 45 triglyceride 148 which is satisfactory.  The glycohemoglobin was 6.1% CBC and electrolyte panel is normal.  7. History of cardiogenic syncope.  8. GERD.  9. Bronchospastic airway disease.  10.Osteoporosis.  11.History of atypical pneumonia.  12.Remote left-sided cataract  extraction.  13.Borderline type 2 diabetes. The patient's most recent glycohemoglobin was 6. 5% on 04/01/2020 and also 6.4% when checked on 11/2/2020.  Patient complaining of some numbness in the feet scheduled for EMG.  14. PVD. Patient apparently saw PCP after visit from [place] with bilateral pedal edema and redness. Was switched from losartan-hydrochlorothiazide to furosemide 40 mg daily and given antibiotic. Remain on furosemide 40 mg daily. The patient did have lower extremity ultrasound on 04/03/2018 negative for DVT.  15. Obstructive sleep apnea: On CPAP.   16. Low-grade carotid vascular disease. Carotid US done 11/2017 showed < 50% stenosis bilaterally with possible left subclavian stenosis. The patient is on Plavix 75 mg daily as per neurology.  17. OA. Left knee moderate OA. Follows with Dr Wing. Recent x-rays from 05/07/2020 demonstrated bilateral advanced medial compartmental DJD.  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 The patient is had 2 episodes of stumbling and falling over the past several weeks. On one occasion she tripped over a curb and another time on uneven pavement. A head CT on 11/22/2019 was unremarkable. A CT scan of the lumbosacral spine on 11/20/2019 showed multilevel DJD most prominent at L5-S1. the patient is followed by neurology.  Patient has received epidural injections to the lumbosacral spine for her chronic low back pain.  She has some numbness in her feet and will be scheduled for EMG.  19. Vitamin D deficiency. The patient had a vitamin D level of only 26 on 10/04/2019 as been placed on supplementation.  20. Obstructive sleep apnea. Continue follow-up with pulmonology and the use of a BiPAP mask.          Afua Best, APRN-CNP

## 2024-02-27 NOTE — TELEPHONE ENCOUNTER
----- Message from Christopher D'Amico, DO sent at 2/21/2024  8:15 AM EST -----  Renal function slightly decreased from last lab, but stable over the last year

## 2024-02-27 NOTE — TELEPHONE ENCOUNTER
Result Communication    Resulted Orders   Renal function panel   Result Value Ref Range    Glucose 103 (H) 74 - 99 mg/dL    Sodium 144 136 - 145 mmol/L    Potassium 4.3 3.5 - 5.3 mmol/L    Chloride 108 (H) 98 - 107 mmol/L    Bicarbonate 25 21 - 32 mmol/L    Anion Gap 15 10 - 20 mmol/L    Urea Nitrogen 26 (H) 6 - 23 mg/dL    Creatinine 1.21 (H) 0.50 - 1.05 mg/dL    eGFR 44 (L) >60 mL/min/1.73m*2      Comment:      Calculations of estimated GFR are performed using the 2021 CKD-EPI Study Refit equation without the race variable for the IDMS-Traceable creatinine methods.  https://jasn.asnjournals.org/content/early/2021/09/22/ASN.7603881131    Calcium 9.3 8.6 - 10.6 mg/dL    Phosphorus 3.8 2.5 - 4.9 mg/dL      Comment:      The performance characteristics of phosphorus testing in heparinized plasma have been validated by the individual  laboratory site where testing is performed. Testing on heparinized plasma is not approved by the FDA; however, such approval is not necessary.    Albumin 4.0 3.4 - 5.0 g/dL       11:19 AM      Results were not successfully communicated with the patient and they did not acknowledge their understanding.

## 2024-02-27 NOTE — PROGRESS NOTES
Patient to be scheduled for Continuation of Prolia injections.  For Diagnosis: Osteoporosis    Labs..  Calcium drawn on:   Lab Results   Component Value Date    CALCIUM 9.3 02/15/2024    PHOS 3.8 02/15/2024      Lab Results   Component Value Date    CAION 1.20 02/14/2022     (>8.6mg/dl or ionized calcium WNL.  Hold / Contact provider if <8.6) (must be within 28 days of scheduled injection)    Lab Results   Component Value Date    CREATININE 1.21 (H) 02/15/2024      Crcl: 47.991  (Patients with a crcl <30 are at increased risk of hypocalcemia)      *Not a hard stop. If crcl < 30 pt to discuss supplementation with prescribing provider.    Calcium and Vitamin D supplement on medication list? Yes    Current Outpatient Medications:     apixaban (Eliquis) 2.5 mg tablet, Take 1 tablet (2.5 mg) by mouth 2 times a day., Disp: 180 tablet, Rfl: 3    aspirin 81 mg EC tablet, Take 1 tablet (81 mg) by mouth once daily., Disp: , Rfl:     calcium phosphate-vitamin D3 250 mg-12.5 mcg (500 unit) tablet,chewable, TAKE BY MOUTH AS DIRECTED, Disp: , Rfl:     cholecalciferol (Vitamin D-3) 50 mcg (2,000 unit) capsule, Take 1 capsule (50 mcg) by mouth once daily., Disp: , Rfl:     denosumab (Prolia) 60 mg/mL syringe, 60 mg subq every 6 months, Disp: , Rfl:     diclofenac sodium (Voltaren) 1 % gel gel, , Disp: , Rfl:     empagliflozin (Jardiance) 25 mg, Take 1 tablet (25 mg) by mouth once daily., Disp: 90 tablet, Rfl: 3    esomeprazole (NexIUM) 20 mg DR capsule, Take 1 capsule (20 mg) by mouth once daily., Disp: , Rfl:     flecainide (Tambocor) 50 mg tablet, Take 1 tablet (50 mg) by mouth 2 times a day., Disp: , Rfl:     furosemide (Lasix) 40 mg tablet, Take 1 tablet (40 mg) by mouth once daily. PRN swelling, Disp: , Rfl:     losartan (Cozaar) 100 mg tablet, Take 1 tablet (100 mg) by mouth once daily., Disp: , Rfl:     lubricating eye drops ophthalmic solution, 1 drop if needed for dry eyes., Disp: , Rfl:     metoprolol tartrate  (Lopressor) 25 mg tablet, Take 1 tablet (25 mg) by mouth 2 times a day., Disp: , Rfl:     rosuvastatin (Crestor) 40 mg tablet, Take 1 tablet (40 mg) by mouth once daily., Disp: 90 tablet, Rfl: 3   (if no nurse to encourage discussion of supplementation at visit)    No obvious recent dental work per chart review. Nurse to confirm no dental within past/next 4 weeks at encounter.  Urine Hcg test ordered?Not applicable (If female pt <60 years of age and with reproductive capability)  (If urine Hcg test ordered please instruct pt upon scheduling to drink 32 ounces of water 1 hour before arrival so bladder is full for needed urine sample)    Last injection received: 8/21/23 (if continuation)    Due: 2/21/24    This result meets treatment criteria. Ok to schedule for prolia within 28 days of the calcium lab draw date listed above.

## 2024-03-08 DIAGNOSIS — E78.5 HYPERLIPIDEMIA: Primary | ICD-10-CM

## 2024-03-08 RX ORDER — ROSUVASTATIN CALCIUM 40 MG/1
40 TABLET, COATED ORAL DAILY
Qty: 90 TABLET | Refills: 3 | Status: SHIPPED | OUTPATIENT
Start: 2024-03-08 | End: 2025-03-08

## 2024-03-18 DIAGNOSIS — M81.0 OSTEOPOROSIS, UNSPECIFIED OSTEOPOROSIS TYPE, UNSPECIFIED PATHOLOGICAL FRACTURE PRESENCE: Primary | ICD-10-CM

## 2024-03-18 RX ORDER — DENOSUMAB 60 MG/ML
INJECTION SUBCUTANEOUS
Qty: 1 ML | Refills: 3 | Status: SHIPPED
Start: 2024-03-18

## 2024-03-21 ENCOUNTER — INFUSION (OUTPATIENT)
Dept: INFUSION THERAPY | Facility: CLINIC | Age: 84
End: 2024-03-21
Payer: MEDICARE

## 2024-03-21 VITALS
WEIGHT: 196.21 LBS | SYSTOLIC BLOOD PRESSURE: 142 MMHG | DIASTOLIC BLOOD PRESSURE: 62 MMHG | OXYGEN SATURATION: 97 % | HEART RATE: 84 BPM | BODY MASS INDEX: 38.32 KG/M2 | TEMPERATURE: 97.7 F | RESPIRATION RATE: 18 BRPM

## 2024-03-21 DIAGNOSIS — M81.0 OSTEOPOROSIS, UNSPECIFIED OSTEOPOROSIS TYPE, UNSPECIFIED PATHOLOGICAL FRACTURE PRESENCE: ICD-10-CM

## 2024-03-21 PROCEDURE — 96372 THER/PROPH/DIAG INJ SC/IM: CPT | Performed by: NURSE PRACTITIONER

## 2024-03-21 RX ORDER — DIPHENHYDRAMINE HYDROCHLORIDE 50 MG/ML
50 INJECTION INTRAMUSCULAR; INTRAVENOUS AS NEEDED
OUTPATIENT
Start: 2024-09-17

## 2024-03-21 RX ORDER — EPINEPHRINE 0.3 MG/.3ML
0.3 INJECTION SUBCUTANEOUS EVERY 5 MIN PRN
OUTPATIENT
Start: 2024-09-17

## 2024-03-21 RX ORDER — ALBUTEROL SULFATE 0.83 MG/ML
3 SOLUTION RESPIRATORY (INHALATION) AS NEEDED
OUTPATIENT
Start: 2024-09-17

## 2024-03-21 RX ORDER — FAMOTIDINE 10 MG/ML
20 INJECTION INTRAVENOUS ONCE AS NEEDED
OUTPATIENT
Start: 2024-09-17

## 2024-03-21 ASSESSMENT — ENCOUNTER SYMPTOMS
APPETITE CHANGE: 0
VOICE CHANGE: 0
CHILLS: 0
TROUBLE SWALLOWING: 0
COUGH: 0
VOMITING: 0
DYSURIA: 0
DEPRESSION: 0
FATIGUE: 1
CONSTIPATION: 0
WOUND: 0
NAUSEA: 0
NUMBNESS: 1
LIGHT-HEADEDNESS: 0
WHEEZING: 0
ARTHRALGIAS: 0
ROS GI COMMENTS: OCCASIONAL CONSTIPATION
ABDOMINAL PAIN: 0
NERVOUS/ANXIOUS: 0
LEG SWELLING: 0
EYE PROBLEMS: 0
PALPITATIONS: 0
EXTREMITY WEAKNESS: 0
BLOOD IN STOOL: 0
SLEEP DISTURBANCE: 0
HEMATURIA: 0
SHORTNESS OF BREATH: 0
CONFUSION: 0
BRUISES/BLEEDS EASILY: 1
FEVER: 0
UNEXPECTED WEIGHT CHANGE: 0
DIARRHEA: 0
SORE THROAT: 0
FREQUENCY: 0
DIZZINESS: 0
HEADACHES: 0
MYALGIAS: 0

## 2024-03-21 NOTE — PATIENT INSTRUCTIONS
Today :We administered denosumab. Prolia 60mg subcutaneous injection right upper arm  Blood Calcium within 28 days of next appointment    For:   1. Osteoporosis, unspecified osteoporosis type, unspecified pathological fracture presence       Your next appointment is due in: 6 months     Please read the  Medication Guide that was given to you and reviewed during todays visit.     (Tell all doctors including dentists that you are taking this medication)     Go to the emergency room or call 911 if:  -You have signs of allergic reaction:   -Rash, hives, itching.   -Swollen, blistered, peeling skin.   -Swelling of face, lips, mouth, tongue or throat.   -Tightness of chest, trouble breathing, swallowing or talking     Call your doctor:  - If injection site gets red, warm, swollen, itchy or leaks fluid or pus.     (Leave band aid on your injection site for at least 2 hours and keep area clean and dry  - If you get sick or have symptoms of infection or are not feeling well for any reason.    (Wash your hands often, stay away from people who are sick)  - If you have side effects from your medication that do not go away or are bothersome.     (Refer to the teaching your nurse gave you for side effects to call your doctor about)    - Common side effects may include:  stuffy nose, headache, feeling tired, muscle aches, upset stomach  - Before receiving any vaccines     - Call the [facility] at   If:  - You get sick, are on antibiotics, have had a recent vaccine, have surgery or dental work and your doctor wants your visit rescheduled.  - You need to cancel and reschedule your visit for any reason. Call at least 2 days before your visit if you need to cancel.   - Your insurance changes before your next visit.    (We will need to get approval from your new insurance. This can take up to two weeks.)     The [facility] is opened Monday thru Friday. We are closed on weekends and holidays.   Voice  mail will take your call if the center is closed. If you leave a message please allow 24 hours for a call back during weekdays. If you leave a message on a weekend/holiday, we will call you back the next business day.

## 2024-03-21 NOTE — PROGRESS NOTES
[facility]   Infusion Clinic Note   Date: 2024   Name: Marcelle Hewitt  : 1940   MRN: [id number]         Reason for Visit: Injections (Every 6 months Prolia 60mg subcutaneous  injection)         Visit Type: INJECTION       Ordered By:  Christopher D'Amico DO      Accompanied by:Self      Diagnosis: Osteoporosis, unspecified osteoporosis type, unspecified pathological fracture presence       Allergies:   Allergies as of 2024 - Reviewed 2024   Allergen Reaction Noted   • Niacin Unknown 2023   • Pregabalin Unknown 2023   • Sulfa (sulfonamide antibiotics) Unknown 2023         Current Medications has a current medication list which includes the following prescription(s): apixaban, aspirin, calcium phosphate-vitamin d3, cholecalciferol, prolia, diclofenac sodium, empagliflozin, esomeprazole, flecainide, furosemide, losartan, lubricating eye drops, metoprolol tartrate, and rosuvastatin.       Vitals:   Vitals:    24 1000   BP: 142/62   Pulse: 84   Resp: 18   Temp: 36.5 °C (97.7 °F)   TempSrc: Temporal   SpO2: 97%   Weight: 89 kg (196 lb 3.4 oz)             Infusion Pre-procedure Checklist:   - Allergies reviewed: yes   - Medications reviewed: yes       - Previous reaction to current treatment: no      Assess patient for the concerns below. Document provider notification as appropriate.  - Active or recent infection with/without current antibiotic use: no  - Recent or planned invasive dental work: no  - Recent or planned surgeries: no  - Recently received or plans to receive vaccinations: no  - Has treatment related toxicities: no  - Is pregnant:  no      Pain: 0   - Is the pain different from normal: n/a   - Is the pain tolerable: n/a   - Is your Doctor aware:  n/a               Fall Risk Screening: Susan Fall Risk  History of Falling, Immediate or Within 3 Months: Yes (, lost balance)  Ambulatory Aid: Walks without aid/bedrest/nurse  assist (Occasionally will use a cane, when walks a lot)  Intravenous Therapy/Heparin Lock: No  Gait/Transferring: Normal/bedrest/immobile  Mental Status: Oriented to own ability       Review Of Systems:  Review of Systems   Constitutional:  Positive for fatigue. Negative for appetite change, chills, fever and unexpected weight change.   HENT:   Negative for hearing loss, mouth sores, sore throat, tinnitus, trouble swallowing and voice change.         Occasional clicking sound in ears when eating   Eyes:  Negative for eye problems.        Wears glasses   Respiratory:  Negative for cough, shortness of breath and wheezing.         Occasional SOB, gentry when walking a distance  H/O sleep apnea-wears c-pap at hs   Cardiovascular:  Negative for chest pain, leg swelling and palpitations.        H/O pacemaker  Occasional leg swelling, will take a water pill   Gastrointestinal:  Negative for abdominal pain, blood in stool, constipation, diarrhea, nausea and vomiting.        Occasional constipation   Genitourinary:  Negative for dysuria, frequency and hematuria.    Musculoskeletal:  Negative for arthralgias and myalgias.   Skin:  Negative for itching, rash and wound.   Neurological:  Positive for numbness. Negative for dizziness, extremity weakness, headaches and light-headedness.        Occasional light-headedness  Numbness bilateral feet   Hematological:  Bruises/bleeds easily.        On blood thinner   Psychiatric/Behavioral:  Negative for confusion, depression and sleep disturbance. The patient is not nervous/anxious.          Infusion Readiness:   - Assessment Concerns Related to Infusion: No  - Provider notified: n/a      Document Below Only If Indicated:   New Patient Education:    N/A (returning patient for continuation of therapy. Ongoing education provided as needed.)        Treatment Conditions & Drug Specific Questions:    Denosumab  (PROLIA. XGEVA)    (Unless otherwise specified on patient specific therapy plan):      TREATMENT CONDITIONS:  Unless otherwise specified on patient specific therapy plan HOLD and notify provider prior to proceeding with today's injection if patients:  o Calcium is LESS THAN 8.6 mg/dL OR  Ionized Calcium LESS THAN 1.1 mmol/L  o Recent or planned invasive dental procedure (within 4 weeks)    Lab Results   Component Value Date    CALCIUM 9.3 02/15/2024    PHOS 3.8 02/15/2024      Lab Results   Component Value Date    CAION 1.20 02/14/2022     Labs reviewed and patient meets treatment conditions? Yes    DRUG SPECIFIC QUESTIONS:  Is the patient taking calcium and vitamin D? Yes  (Recommended)    Pt Instructed on following risks: (1) hypocalcemia, (2) osteonecrosis of the jaw, (3) atypical femoral fractures, (4) serious infections, and (5) dermatologic reactions?  Yes      REMINDER:  PREGNANCY CATEGORY X DRUG. OBTAIN NEGTATIVE PREGNANCY TEST PRIOR TO FIRST INFUSION FOR WOMEN OF CHILDBEARING ABILITY   REMS DRUG    Recommended Vitals/Observation:  Vitals: Obtain vitals prior to injection.  Observation: Patient may leave immediately following injection.        Weight Based Drug Calculations:    WEIGHT BASED DRUGS: NOT APPLICABLE / FLAT DOSE          Initiated By: Ankita Spencer LPN   Time: 10:25 AM     We administered denosumab.

## 2024-03-26 ENCOUNTER — APPOINTMENT (OUTPATIENT)
Dept: CARDIOLOGY | Facility: CLINIC | Age: 84
End: 2024-03-26
Payer: MEDICARE

## 2024-04-08 ENCOUNTER — HOSPITAL ENCOUNTER (OUTPATIENT)
Dept: CARDIOLOGY | Facility: CLINIC | Age: 84
Discharge: HOME | End: 2024-04-08
Payer: MEDICARE

## 2024-04-08 DIAGNOSIS — I48.0 PAROXYSMAL ATRIAL FIBRILLATION (MULTI): ICD-10-CM

## 2024-04-08 PROCEDURE — 93294 REM INTERROG EVL PM/LDLS PM: CPT | Performed by: INTERNAL MEDICINE

## 2024-04-08 PROCEDURE — 93296 REM INTERROG EVL PM/IDS: CPT

## 2024-04-09 RX ORDER — FLECAINIDE ACETATE 50 MG/1
50 TABLET ORAL 2 TIMES DAILY
Qty: 180 TABLET | Refills: 3 | Status: SHIPPED | OUTPATIENT
Start: 2024-04-09 | End: 2025-04-09

## 2024-04-09 RX ORDER — METOPROLOL TARTRATE 25 MG/1
25 TABLET, FILM COATED ORAL 2 TIMES DAILY
Qty: 180 TABLET | Refills: 3 | Status: SHIPPED | OUTPATIENT
Start: 2024-04-09 | End: 2025-04-09

## 2024-04-17 ENCOUNTER — OFFICE VISIT (OUTPATIENT)
Dept: ORTHOPEDIC SURGERY | Facility: CLINIC | Age: 84
End: 2024-04-17
Payer: MEDICARE

## 2024-04-17 DIAGNOSIS — M17.0 PRIMARY OSTEOARTHRITIS OF BOTH KNEES: Primary | ICD-10-CM

## 2024-04-17 PROCEDURE — 1036F TOBACCO NON-USER: CPT | Performed by: ORTHOPAEDIC SURGERY

## 2024-04-17 PROCEDURE — 1159F MED LIST DOCD IN RCRD: CPT | Performed by: ORTHOPAEDIC SURGERY

## 2024-04-17 PROCEDURE — 1125F AMNT PAIN NOTED PAIN PRSNT: CPT | Performed by: ORTHOPAEDIC SURGERY

## 2024-04-17 PROCEDURE — 20610 DRAIN/INJ JOINT/BURSA W/O US: CPT | Performed by: ORTHOPAEDIC SURGERY

## 2024-04-17 PROCEDURE — 1160F RVW MEDS BY RX/DR IN RCRD: CPT | Performed by: ORTHOPAEDIC SURGERY

## 2024-04-17 RX ORDER — LIDOCAINE HYDROCHLORIDE 10 MG/ML
4 INJECTION INFILTRATION; PERINEURAL
Status: COMPLETED | OUTPATIENT
Start: 2024-04-17 | End: 2024-04-17

## 2024-04-17 RX ORDER — TRIAMCINOLONE ACETONIDE 40 MG/ML
1 INJECTION, SUSPENSION INTRA-ARTICULAR; INTRAMUSCULAR
Status: COMPLETED | OUTPATIENT
Start: 2024-04-17 | End: 2024-04-17

## 2024-04-17 RX ADMIN — LIDOCAINE HYDROCHLORIDE 4 ML: 10 INJECTION INFILTRATION; PERINEURAL at 14:54

## 2024-04-17 RX ADMIN — TRIAMCINOLONE ACETONIDE 1 ML: 40 INJECTION, SUSPENSION INTRA-ARTICULAR; INTRAMUSCULAR at 14:54

## 2024-04-17 ASSESSMENT — PAIN SCALES - GENERAL: PAINLEVEL_OUTOF10: 8

## 2024-04-17 ASSESSMENT — PAIN - FUNCTIONAL ASSESSMENT: PAIN_FUNCTIONAL_ASSESSMENT: 0-10

## 2024-04-17 NOTE — PROGRESS NOTES
Right hip x-rays at next visit      L Inj/Asp: bilateral knee on 4/17/2024 2:54 PM  Indications: pain and joint swelling  Details: 22 G needle, anterolateral approach  Medications (Right): 1 mL triamcinolone acetonide 40 mg/mL; 4 mL lidocaine 10 mg/mL (1 %)  Medications (Left): 1 mL triamcinolone acetonide 40 mg/mL; 4 mL lidocaine 10 mg/mL (1 %)    Discussion:  I discussed the conservative treatment options for knee osteoarthritis including but not limited to physical therapy, oral NSAIDS, activity and lifestyle modification, and corticosteroid injections. Pt has elected to undergo a cortisone injection today. I have explained the risk and benefits of an injection including the possibility of joint infection, bleeding, damage to cartilage, allergic reaction. Patient verbalized understanding and gave verbal consent wishes to proceed with a intra-articular cortisone injection for their knee.    Procedure:  After discussing the risk and benefits of the procedure, we proceeded with an intra-articular bilateral knee injection.    With the patient's informed verbal consent, the bilateral knees were prepped in standard sterile fashion with Chlorhexidine. The skin was then anesthetized with ethyl chloride spray and cleaned again with Chlorhexidine. The bilateral knees were then apirated/injected with a prefilled 20-gauge syringe of 40 mg Kenalog + 4 ml Lidocaine using the lateral approach without complications.  The patient tolerated this well and felt immediate initial relief of symptoms. A bandaid was applied and the patient ambulated out of the clinic on ther own accord without difficulty. Patient was instructed to avoid physical activity for 24-48 hours to prevent the knees from swelling and may ice the knees as tolerated. Patient should contact the office if any signs of of infection appear: redness, fever, chills, drainage, swelling or warmth to the knees.  Pt understands that the injections can be repeated no sooner  than 3 months.      Procedure, treatment alternatives, risks and benefits explained, specific risks discussed. Consent was given by the patient. Immediately prior to procedure a time out was called to verify the correct patient, procedure, equipment, support staff and site/side marked as required. Patient was prepped and draped in the usual sterile fashion.

## 2024-04-23 PROCEDURE — RXMED WILLOW AMBULATORY MEDICATION CHARGE

## 2024-04-24 ENCOUNTER — TELEMEDICINE (OUTPATIENT)
Dept: PHARMACY | Facility: HOSPITAL | Age: 84
End: 2024-04-24
Payer: MEDICARE

## 2024-04-24 DIAGNOSIS — I48.91 ATRIAL FIBRILLATION, UNSPECIFIED TYPE (MULTI): Primary | ICD-10-CM

## 2024-04-24 DIAGNOSIS — I50.32 CHRONIC DIASTOLIC CONGESTIVE HEART FAILURE (MULTI): ICD-10-CM

## 2024-04-24 NOTE — ASSESSMENT & PLAN NOTE
RECOMMENDATIONS/PLAN  Continue Eliquis 2.5mg BID   Provider prefers lower dose due to kidney function

## 2024-04-24 NOTE — Clinical Note
Hi Leonie! · Patient reports she would like to loose some weight, however SOB limits movement, she gets out of breath with minimal exertion (ex. Walking from living room to kitchen, standing at counter to empty ), and is also limited by back pain  · Counseled on fall prevention, monitoring surroundings, using assistance /railings with stairs or curbs · Patient uses pill box to organize medications, reports no missed doses this month · Discussed plan to collect paperwork to renew  PAP at next visit · Discussed slight diuretic effect of Jardiance, and use of furosemide as needed for edema/ swelling Continuing to follow

## 2024-04-24 NOTE — ASSESSMENT & PLAN NOTE
RECOMMENDATIONS/PLAN  Continue Jardiance 25mg once daily  For heart failure and diabetes control  Continue all meds under the continuation of care with the referring provider and clinical pharmacy team.

## 2024-04-24 NOTE — PROGRESS NOTES
"Pharmacist Clinic: Anticoagulation and Heart Failure Management  Marcelle Hewitt was referred to the Clinical Pharmacy Team for her anticoagulation and Heart Failure management.    Referring Provider:  Afua Best CNP    Last Appointment w/ Pharmacist: 1/24/2024  Pharmacist Name: Lisa Shore, PharmD  _______________________________________________________________________  PHARMACY ASSESSMENT    Review of Past Appointment:   Patient reported occasionally forgetting the evening dose of Eliquis   [facility] PAP for Jardiance and Eliquis, renewal: 8/4/2024  3 month follow-up for medications   Patient reported 1 fall without injury or headstrike    RELEVANT LAB RESULTS  Lab Results   Component Value Date    BILITOT 0.4 01/09/2024    CALCIUM 9.3 02/15/2024    CO2 25 02/15/2024     (H) 02/15/2024    CREATININE 1.21 (H) 02/15/2024    GLUCOSE 103 (H) 02/15/2024    ALKPHOS 61 01/09/2024    K 4.3 02/15/2024    PROT 6.2 (L) 01/09/2024     02/15/2024    AST 15 01/09/2024    ALT 14 01/09/2024    BUN 26 (H) 02/15/2024    ANIONGAP 15 02/15/2024    MG 2.00 03/17/2023    PHOS 3.8 02/15/2024    ALBUMIN 4.0 02/15/2024    GFRF 58 (A) 07/19/2023     Lab Results   Component Value Date    TRIG 170 (H) 01/09/2024    CHOL 137 01/09/2024    LDLCALC 60 01/09/2024    HDL 42.8 01/09/2024     No results found for: \"BMCBC\", \"CBCDIF\"   _______________________________________________________________________  ANTICOAGULATION ASSESSMENT    The ASCVD Risk score (Thomas STAPLES, et al., 2019) failed to calculate for the following reasons:    The 2019 ASCVD risk score is only valid for ages 40 to 79    DIAGNOSIS: prevention of nonvalvular atrial fibrilliation stroke and systemic embolism  - Patient is projected to be on anticoagulation indefinitely  - [id number] Score: [6] (only included if diagnosis is atrial fibrillation)   Age: [<65 (0)] [65-74 (+1)] [> 75 (+2)]: 2  Sex: [Male/Female (+1)]: 1  CHF history: [No/Yes(+1)]: 1  Hypertension " "history: [No/Yes(+1)]: 1  Stroke/TIA/thromboembolism history: [No/Yes(+2)]: 0  Vascular disease history (prior MI, peripheral artery disease, aortic plaque): [No/Yes(+1)]: 0  Diabetes history: [No/Yes(+1)]: 1    CURRENT PHARMACOTHERAPY:   Eliquis 2.5mg BID  82 y/o  89 kg  Scr 1.21  Provider prefers lower dose due to kidney function    UPDATE ON PHARMACOTHERAPY:   Affordability/Accessibility:  PAP renewal 8/4/2024  Adherence/Organization:   Uses pill box for med organization  No missed doses   Adverse Effects: no bleeding or bruising   Recent Hospitalizations: none   Recent Falls/Trauma:   1 fall: went to [employer] for lunch, felt like \"feet were stuck on floor, could not pick them up\", fell to right, no injuries, no head strike  Is afraid of falling on curbs, is careful to watch for curbs, and walks up ramps or has assistance over curbs   Changes in Tobacco or Alcohol Intake:   Tobacco: no changes   Alcohol: no changes  _______________________________________________________________________  CHF ASSESSMENT     Symptom/Staging:  -Most recent ejection fraction: 55-60%  -NYHA Stage: unknown    Guideline-Directed Medical Therapy:  -ARNI: No   -If no, then ACEi/ARB?: Yes, describe: losartan 100mg  -Beta Blocker: Yes, describe: metoprolol tartrate 25mg  -MRA: No  -SGLT2i: Yes, describe: Jardiance 25mg    Secondary Prevention:  -The ASCVD Risk score (Thomas DK, et al., 2019) failed to calculate for the following reasons:    The 2019 ASCVD risk score is only valid for ages 40 to 79   -Aspirin 81mg? yes  -Statin?: Yes, describe: Rosuvastatin 40mg  -HTN?: Yes, describe: losartan 100mg, metoprolol tartrate 25mg  Lasix 40mg as needed for edema  Will take daily for a few days if she has swelling  Took about 2-3 weeks ago for a couple of days    CURRENT PHARMACOTHERAPY:   Jardiance 25mg daily     UPDATE ON PHARMACOTHERAPY:   Affordability/Accessibility:  PAP expiration 8/4/2024  Adherence/Organization: no issues " reported  Adverse Effects: increased urinary frequency    Monitoring Weights at Home: No  Home Weight Recordings: N/A - has frequent doctors appointments, monitors weight there  Past In Office Weight Readings: 89 kg    Monitoring Blood Pressure at Home: Yes (usually)  Home BP Recordings: currently cannot find blood pressure cuff  Past In Office BP Readings: 142/62     HEALTH MANAGEMENT    Maintaining fluid restriction (<2 L/day): No  Drinks coffee, tea, flavored water (Mike) - stays hydrated while on Jardiance  Edema/swelling: Yes, occasionally  Shortness of breath: Yes   With exertion (ex. Walking from living room to kitchen)  Less now than before starting Jardiance   Trouble sleeping/lying down: No - has sleep apnea, uses CPAP  Dry/hacking cough: No  Recent Hospitalizations: No    DISCUSSION/NOTES  Patient reports she would like to loose some weight, however SOB limits movement, she gets out of breath with minimal exertion (ex. Walking from living room to kitchen, standing at counter to empty ), and is also limited by back pain   Counseled on fall prevention, monitoring surroundings, using assistance /railings with stairs or curbs  Patient uses pill box to organize medications, reports no missed doses this month  Discussed plan to collect paperwork to renew [facility] PAP at next visit  Discussed slight diuretic effect of Jardiance, and use of furosemide as needed for edema/ swelling    ______________________________________________________________________  PATIENT EDUCATION/GOALS  - Counseled patient on MOA, expectations, duration of therapy, contraindications, administration, and monitoring parameters  - Counseled patient of side effects that are indicative of bleeding such as dark tarry stool, unexplainable bruising, or vomiting up a coffee ground like substance  - Counseled patient on lifestyle modifications that can decrease your risk of having complications (smoking cessation, losing weight, daily weights,  vaccines)  - Counseled patient on fluid intake and weight management. Recommended to not consume more than 2 liters of fliuids per day. If they have gained more than 2-3 pounds within a 24 hours period (or 5 pounds in a week), contact their cardiologist  - Answered all patient questions and concerns  _______________________________________________________________________  RECOMMENDATIONS/PLAN  Continue Eliquis 2.5mg BID   Provider prefers lower dose due to kidney function  Continue Jardiance 25mg once daily  For heart failure and diabetes control    Next Cardiology Appointment: 6/25/2024  Clinical Pharmacist follow up: 6/26/2024 @ 11am  VAF/Application Expiration: Yes    Date: 8/4/2024  Type of Encounter: Leeann Newby, Demetrius , [facility]  PGY1 [facility] Pharm[facility] Resident     Verbal consent to manage patient's drug therapy was obtained from the patient . They were informed they may decline to participate or withdraw from participation in pharmacy services at any time.    Continue all meds under the continuation of care with the referring provider and clinical pharmacy team.

## 2024-04-27 ENCOUNTER — PHARMACY VISIT (OUTPATIENT)
Dept: PHARMACY | Facility: CLINIC | Age: 84
End: 2024-04-27
Payer: MEDICARE

## 2024-05-08 PROCEDURE — RXMED WILLOW AMBULATORY MEDICATION CHARGE

## 2024-05-10 ENCOUNTER — PHARMACY VISIT (OUTPATIENT)
Dept: PHARMACY | Facility: CLINIC | Age: 84
End: 2024-05-10
Payer: MEDICARE

## 2024-06-04 DIAGNOSIS — I10 HYPERTENSION, UNSPECIFIED TYPE: Primary | ICD-10-CM

## 2024-06-04 RX ORDER — LOSARTAN POTASSIUM 100 MG/1
100 TABLET ORAL DAILY
Qty: 90 TABLET | Refills: 1 | Status: SHIPPED | OUTPATIENT
Start: 2024-06-04

## 2024-06-17 DIAGNOSIS — I10 HYPERTENSION, UNSPECIFIED TYPE: Primary | ICD-10-CM

## 2024-06-17 RX ORDER — LOSARTAN POTASSIUM 100 MG/1
100 TABLET ORAL DAILY
Qty: 90 TABLET | Refills: 3 | Status: SHIPPED | OUTPATIENT
Start: 2024-06-17 | End: 2025-06-17

## 2024-06-24 PROBLEM — M72.0 PALMAR FASCIAL FIBROMATOSIS: Status: ACTIVE | Noted: 2023-04-06

## 2024-06-25 ENCOUNTER — OFFICE VISIT (OUTPATIENT)
Dept: CARDIOLOGY | Facility: CLINIC | Age: 84
End: 2024-06-25
Payer: MEDICARE

## 2024-06-25 VITALS
HEART RATE: 70 BPM | DIASTOLIC BLOOD PRESSURE: 65 MMHG | HEIGHT: 60 IN | WEIGHT: 199.2 LBS | SYSTOLIC BLOOD PRESSURE: 157 MMHG | OXYGEN SATURATION: 96 % | BODY MASS INDEX: 39.11 KG/M2

## 2024-06-25 DIAGNOSIS — I10 HYPERTENSION, UNSPECIFIED TYPE: Primary | ICD-10-CM

## 2024-06-25 PROCEDURE — 1036F TOBACCO NON-USER: CPT | Performed by: NURSE PRACTITIONER

## 2024-06-25 PROCEDURE — 1160F RVW MEDS BY RX/DR IN RCRD: CPT | Performed by: NURSE PRACTITIONER

## 2024-06-25 PROCEDURE — 1159F MED LIST DOCD IN RCRD: CPT | Performed by: NURSE PRACTITIONER

## 2024-06-25 PROCEDURE — 99214 OFFICE O/P EST MOD 30 MIN: CPT | Performed by: NURSE PRACTITIONER

## 2024-06-25 PROCEDURE — 1126F AMNT PAIN NOTED NONE PRSNT: CPT | Performed by: NURSE PRACTITIONER

## 2024-06-25 PROCEDURE — 3077F SYST BP >= 140 MM HG: CPT | Performed by: NURSE PRACTITIONER

## 2024-06-25 PROCEDURE — 3078F DIAST BP <80 MM HG: CPT | Performed by: NURSE PRACTITIONER

## 2024-06-25 ASSESSMENT — ENCOUNTER SYMPTOMS
DEPRESSION: 0
CARDIOVASCULAR NEGATIVE: 1
RESPIRATORY NEGATIVE: 1
NEUROLOGICAL NEGATIVE: 1
GASTROINTESTINAL NEGATIVE: 1
CONSTITUTIONAL NEGATIVE: 1
LOSS OF SENSATION IN FEET: 0
MUSCULOSKELETAL NEGATIVE: 1
OCCASIONAL FEELINGS OF UNSTEADINESS: 0

## 2024-06-25 ASSESSMENT — PAIN SCALES - GENERAL: PAINLEVEL: 0-NO PAIN

## 2024-06-25 NOTE — PROGRESS NOTES
Chief Complaint:   Follow-up and Hypertension    History Of Present Illness:    .Ms Hewitt is here in follow up.  Denies chest pain, sob, palpitations or pedal edema.  Has diaphoresis with exertion.         Last Recorded Vitals:  Blood pressure 157/65, pulse 70, height 1.524 m (5'), weight 90.4 kg (199 lb 3.2 oz), SpO2 96%.     Past Medical History:  Past Medical History:   Diagnosis Date    Abnormal finding of blood chemistry, unspecified 07/08/2022    Abnormal blood chemistry    Anesthesia of skin 11/12/2020    Numbness of foot    Bilateral primary osteoarthritis of hip 03/01/2021    Osteoarthritis, hip, bilateral    Dermatochalasis of unspecified eye, unspecified eyelid 05/06/2019    Dermatochalasis    Dry eye syndrome of bilateral lacrimal glands 03/10/2022    Dry eyes    Dry eye syndrome of unspecified lacrimal gland 04/29/2016    Dry eye syndrome    Encounter for prophylactic measures, unspecified 11/12/2020    Need for prophylactic measure    Encounter for screening mammogram for malignant neoplasm of breast     Visit for screening mammogram    Headache, unspecified 07/02/2021    Morning headache    History of falling 07/12/2017    History of fall    Keratoconjunctivitis sicca, not specified as Sjogren's, bilateral 03/10/2022    Keratoconjunctivitis sicca of both eyes not due to Sjogren's syndrome    Meibomian gland dysfunction left eye, upper and lower eyelids 03/10/2022    Meibomian gland dysfunction (MGD) of upper and lower eyelid of left eye    Meibomian gland dysfunction right eye, upper and lower eyelids 03/10/2022    Meibomian gland dysfunction (MGD) of upper and lower eyelid of right eye    Menopausal and female climacteric states 04/29/2016    Postmenopausal disorder    Morbid (severe) obesity due to excess calories (Multi) 07/08/2022    Class 2 severe obesity with serious comorbidity in adult    Myopia, bilateral 03/10/2022    Myopia with presbyopia of both eyes    Myopia, right eye 03/10/2022     Myopia of right eye    Nonexudative age-related macular degeneration, bilateral, early dry stage 05/06/2019    Early dry stage nonexudative age-related macular degeneration of both eyes    Obesity, unspecified 12/02/2020    Obesity (BMI 30-39.9)    Obstructive sleep apnea (adult) (pediatric) 09/21/2017    ALEXANDER on CPAP    Other abnormalities of breathing 12/20/2021    Difficulty breathing    Other conditions influencing health status     DEXA Body Composition Study    Other conditions influencing health status     History of pregnancy    Other fracture of upper and lower end of left fibula, subsequent encounter for closed fracture with routine healing 06/04/2019    Closed fracture of distal end of left fibula with routine healing, unspecified fracture morphology, subsequent encounter    Other headache syndrome 07/13/2016    Other headache syndrome    Other secondary cataract, unspecified eye     PCO (posterior capsular opacification)    Other specified abnormal findings of blood chemistry 04/08/2020    Elevated serum creatinine    Other symptoms and signs involving the musculoskeletal system 09/08/2021    Weakness of both lower extremities    Other vitreous opacities, unspecified eye     Floaters    Pain in leg, unspecified 11/13/2019    Leg pain    Pain in unspecified limb 09/14/2017    Limb pain    Personal history of diseases of the skin and subcutaneous tissue 04/11/2019    History of cellulitis    Personal history of other diseases of the musculoskeletal system and connective tissue 07/25/2017    History of neck pain    Personal history of other diseases of the musculoskeletal system and connective tissue 07/12/2017    History of muscle spasm    Personal history of other diseases of the musculoskeletal system and connective tissue 06/27/2019    History of osteoporosis    Personal history of other diseases of the musculoskeletal system and connective tissue 03/02/2021    History of spinal stenosis    Personal  history of other diseases of the nervous system and sense organs 03/10/2022    History of blepharitis    Personal history of other diseases of the nervous system and sense organs 11/16/2017    History of sciatica    Personal history of other diseases of the nervous system and sense organs 04/17/2019    History of iritis    Personal history of other drug therapy 10/06/2021    History of influenza vaccination    Personal history of other medical treatment 02/22/2021    History of screening mammography    Personal history of other specified conditions 10/06/2021    History of excessive sweating    Personal history of other specified conditions 02/15/2022    History of headache    Personal history of other specified conditions 12/08/2020    History of shortness of breath    Personal history of other specified conditions 12/19/2017    History of urinary frequency    Personal history of other specified conditions 04/03/2018    History of edema    Personal history of other specified conditions 07/08/2022    History of edema    Personal history of other specified conditions 04/12/2019    History of chronic pain    Personal history of urinary (tract) infections 07/15/2021    History of urinary tract infection    Polyosteoarthritis, unspecified 11/12/2020    Osteoarthritis of multiple joints    Presence of intraocular lens     Presence of artificial intra-ocular lens    Presence of intraocular lens 03/10/2022    Pseudophakia of both eyes    Presence of spectacles and contact lenses     Wears eyeglasses    Shortness of breath 06/30/2022    SOB (shortness of breath) on exertion    Spinal stenosis, lumbar region without neurogenic claudication 02/15/2022    Lumbar canal stenosis    Unilateral primary osteoarthritis, left knee 05/07/2020    Primary osteoarthritis of left knee    Unspecified disorder of refraction 03/10/2022    Refractive error    Unspecified epiphora, unspecified side     Eye tearing    Urinary tract infection,  site not specified 12/19/2017    Recurrent UTI        Past Surgical History:  Past Surgical History:   Procedure Laterality Date    CARDIAC PACEMAKER PLACEMENT  09/17/2021    Pacemaker Permanent Placement    CATARACT EXTRACTION  11/15/2013    Cataract Surgery    OTHER SURGICAL HISTORY  06/04/2019    Tonsillectomy       Social History:  Social History     Socioeconomic History    Marital status:      Spouse name: None    Number of children: None    Years of education: None    Highest education level: None   Occupational History    None   Tobacco Use    Smoking status: Never    Smokeless tobacco: Never   Vaping Use    Vaping status: Never Used   Substance and Sexual Activity    Alcohol use: Never    Drug use: Never    Sexual activity: None   Other Topics Concern    None   Social History Narrative    None     Social Determinants of Health     Financial Resource Strain: Not on file   Food Insecurity: Not on file   Transportation Needs: Not on file   Physical Activity: Not on file   Stress: Not on file   Social Connections: Not on file   Intimate Partner Violence: Not on file   Housing Stability: Not on file       Family History:  Family History   Problem Relation Name Age of Onset    Heart disease Mother      Sleep apnea Mother      Heart disease Father      Sleep apnea Father      Breast cancer Sister      Diabetes Sister      Hypertension Sister      Multiple sclerosis Sister      Strabismus Child           Allergies:  Niacin, Pregabalin, and Sulfa (sulfonamide antibiotics)    Outpatient Medications:  Current Outpatient Medications   Medication Sig Dispense Refill    apixaban (Eliquis) 2.5 mg tablet Take 1 tablet (2.5 mg) by mouth 2 times a day. 180 tablet 3    aspirin 81 mg EC tablet Take 1 tablet (81 mg) by mouth once daily.      calcium phosphate-vitamin D3 250 mg-12.5 mcg (500 unit) tablet,chewable TAKE BY MOUTH AS DIRECTED      cholecalciferol (Vitamin D-3) 50 mcg (2,000 unit) capsule Take 1 capsule (50  mcg) by mouth once daily.      denosumab (Prolia) 60 mg/mL syringe 60 mg subq every 6 months 1 mL 3    diclofenac sodium (Voltaren) 1 % gel gel       empagliflozin (Jardiance) 25 mg Take 1 tablet (25 mg) by mouth once daily. 90 tablet 3    esomeprazole (NexIUM) 20 mg DR capsule Take 1 capsule (20 mg) by mouth once daily.      flecainide (Tambocor) 50 mg tablet Take 1 tablet (50 mg) by mouth 2 times a day. 180 tablet 3    furosemide (Lasix) 40 mg tablet Take 1 tablet (40 mg) by mouth once daily. PRN swelling      losartan (Cozaar) 100 mg tablet Take 1 tablet (100 mg) by mouth once daily. 90 tablet 3    lubricating eye drops ophthalmic solution 1 drop if needed for dry eyes.      metoprolol tartrate (Lopressor) 25 mg tablet Take 1 tablet (25 mg) by mouth 2 times a day. 180 tablet 3    rosuvastatin (Crestor) 40 mg tablet Take 1 tablet (40 mg) by mouth once daily. 90 tablet 3     No current facility-administered medications for this visit.        Physical Exam:  Cardiovascular:      PMI at left midclavicular line. Normal rate. Regular rhythm. Normal S1. Normal S2.       Murmurs: There is no murmur.      No gallop.  No click. No rub.   Pulses:     Intact distal pulses.   Edema:     Peripheral edema absent.         ROS:  Review of Systems   Constitutional: Negative.   Cardiovascular: Negative.    Respiratory: Negative.     Skin: Negative.    Musculoskeletal: Negative.    Gastrointestinal: Negative.    Genitourinary: Negative.    Neurological: Negative.           Last Labs:  CBC -  Lab Results   Component Value Date    WBC 7.9 01/09/2024    HGB 12.6 01/09/2024    HCT 37.0 01/09/2024    MCV 86 01/09/2024     01/09/2024       CMP -  Lab Results   Component Value Date    CALCIUM 9.3 02/15/2024    PHOS 3.8 02/15/2024    PROT 6.2 (L) 01/09/2024    ALBUMIN 4.0 02/15/2024    AST 15 01/09/2024    ALT 14 01/09/2024    ALKPHOS 61 01/09/2024    BILITOT 0.4 01/09/2024       LIPID PANEL -   Lab Results   Component Value Date     CHOL 137 01/09/2024    TRIG 170 (H) 01/09/2024    HDL 42.8 01/09/2024    CHHDL 3.2 01/09/2024    LDLF 62 07/19/2023    VLDL 34 01/09/2024    NHDL 94 01/09/2024       RENAL FUNCTION PANEL -   Lab Results   Component Value Date    GLUCOSE 103 (H) 02/15/2024     02/15/2024    K 4.3 02/15/2024     (H) 02/15/2024    CO2 25 02/15/2024    ANIONGAP 15 02/15/2024    BUN 26 (H) 02/15/2024    CREATININE 1.21 (H) 02/15/2024    CALCIUM 9.3 02/15/2024    PHOS 3.8 02/15/2024    ALBUMIN 4.0 02/15/2024        Lab Results   Component Value Date     (H) 03/17/2023    HGBA1C 6.2 (H) 01/09/2024         Assessment/Plan   Problem List Items Addressed This Visit    None      1. Documented normal coronary arteries by cardiac cath 11/1994 and 10/2001 and 1/2018. The patient again had cardiac cath in [place] 01/2018. Normal coronary arteries. Echocardiogram was performed on 10/05/2016 showing a preserved LV ejection fraction of 55â€“60 percent with mild hypokinesis of the anteroseptal wall presumably due to pacemaker activity. There was however evidence of moderate pulmonary hypertension with moderate 2+ tricuspid valve regurgitation.  Echo 04/2022  CONCLUSIONS:   1. The left ventricular systolic function is normal with a 55-60% estimated ejection fraction.   2. Abnormal septal motion consistent with RV pacemaker.   3. There is mild hypokinesis and dyssynchrony of the anteroseptal wall.   4. The left atrium is mild to moderately dilated.   5. Mildly elevated RVSP.   6. There is mild to moderate tricuspid regurgitation.   7. The estimated PASP is 45 mmHg.   8. There is plaque visualized in the ascending aorta.   2. Positive family history of CAD.  3. History of rate related left bundle branch block conduction delay.  4. Status post dual-chamber permanent pacemaker insertion 08/14/2008 for cardiogenic syncope with pulse generator replacement 08/12/2019. The patient did have a recent device check on 04/06/2020 showing right  atrial pacing 87% right ventricular pacing only 1%. There were no ventricular high rate episodes. There were 3 episodes of either atrial tachycardia or atrial fibrillation all lasting less than 1 minute. The patient did have a more recent pacemaker interrogation on 8/27/2020 done remotely. This demonstrated 100% right atrial pacing with 0% right ventricular pacemaking activity. The estimated battery life is 12 years. There were no episodes of atrial tachycardia or atrial fibrillation on Cardizem  mg daily.  The patient has had a more recent device interrogation 6/7/2023 estimated battery life 9.5 years.  5 episodes of high atrial rates longest lasting greater than 48 hours on 3/13/2023.  Total burden of atrial fibrillation is 2%.  She is atrial pacing 92% ventricular pacing 1%.  The patient is presently on flecainide 50 mg twice daily Eliquis 2.5 mg twice daily and metoprolol 25 mg twice daily.  5. Hypertension. The patient will remain on the same dose of the Cardizem  mg daily and Losartan 50 mg daily. If blood pressure elevates in the future could increase the dose of losartan.  6. Hyperlipidemia. She is presently on rosuvastatin 40 mg daily and her most recent lab work from 04/01/2020 included a cholesterol of 130 LDL 63 HDL 40 triglyceride 132. The CBC and SMA panels were normal with creatinine of 1.11. The patient did have repeat lab work performed on 11/12/2020 with cholesterol 156 LDL 77 triglyceride 100 HDL 58. The liver function panel was normal. Creatinine was 1.22 and glycohemoglobin 6.4%. Vitamin D level was 30. The CBC was normal.  The patient had recent lab work 7/19/2023 with cholesterol 137 LDL 62 HDL 45 triglyceride 148 which is satisfactory.  The glycohemoglobin was 6.1% CBC and electrolyte panel is normal.  7. History of cardiogenic syncope.  8. GERD.  9. Bronchospastic airway disease.  10.Osteoporosis.  11.History of atypical pneumonia.  12.Remote left-sided cataract  extraction.  13.Borderline type 2 diabetes. The patient's most recent glycohemoglobin was 6. 5% on 04/01/2020 and also 6.4% when checked on 11/2/2020.  Patient complaining of some numbness in the feet scheduled for EMG.  14. PVD. Patient apparently saw PCP after visit from [place] with bilateral pedal edema and redness. Was switched from losartan-hydrochlorothiazide to furosemide 40 mg daily and given antibiotic. Remain on furosemide 40 mg daily. The patient did have lower extremity ultrasound on 04/03/2018 negative for DVT.  15. Obstructive sleep apnea: On CPAP.   16. Low-grade carotid vascular disease. Carotid US done 11/2017 showed < 50% stenosis bilaterally with possible left subclavian stenosis. The patient is on Plavix 75 mg daily as per neurology.  17. OA. Left knee moderate OA. Follows with Dr Wing. Recent x-rays from 05/07/2020 demonstrated bilateral advanced medial compartmental DJD.  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 The patient is had 2 episodes of stumbling and falling over the past several weeks. On one occasion she tripped over a curb and another time on uneven pavement. A head CT on 11/22/2019 was unremarkable. A CT scan of the lumbosacral spine on 11/20/2019 showed multilevel DJD most prominent at L5-S1. the patient is followed by neurology.  Patient has received epidural injections to the lumbosacral spine for her chronic low back pain.  She has some numbness in her feet and will be scheduled for EMG.  19. Vitamin D deficiency. The patient had a vitamin D level of only 26 on 10/04/2019 as been placed on supplementation.  20. Obstructive sleep apnea. Continue follow-up with pulmonology and the use of a BiPAP mask.    21.  Mild CKD.       Afua Best, APRN-CNP

## 2024-06-26 ENCOUNTER — APPOINTMENT (OUTPATIENT)
Dept: PHARMACY | Facility: HOSPITAL | Age: 84
End: 2024-06-26
Payer: MEDICARE

## 2024-06-26 DIAGNOSIS — I48.91 ATRIAL FIBRILLATION, UNSPECIFIED TYPE (MULTI): ICD-10-CM

## 2024-06-26 DIAGNOSIS — I50.32 CHRONIC DIASTOLIC CONGESTIVE HEART FAILURE (MULTI): ICD-10-CM

## 2024-06-26 NOTE — PROGRESS NOTES
"Pharmacist Clinic: Anticoagulation and Heart Failure Management  Marcelle Hewitt was referred to the Clinical Pharmacy Team for her anticoagulation and Heart Failure management.    Referring Provider:  Afua Best CNP    Last Appointment w/ Pharmacist: 4/24/2024  Pharmacist Name: Ashley Newby, PharmD  _______________________________________________________________________  PHARMACY ASSESSMENT    Review of Past Appointment:   Patient reported doing well, with no missed doses or falls, using pill box to organize medications with no further missed doses  [facility] PAP for Eliquis and Jardiance, expiration 8/4/2024    RELEVANT LAB RESULTS  Lab Results   Component Value Date    BILITOT 0.4 01/09/2024    CALCIUM 9.3 02/15/2024    CO2 25 02/15/2024     (H) 02/15/2024    CREATININE 1.21 (H) 02/15/2024    GLUCOSE 103 (H) 02/15/2024    ALKPHOS 61 01/09/2024    K 4.3 02/15/2024    PROT 6.2 (L) 01/09/2024     02/15/2024    AST 15 01/09/2024    ALT 14 01/09/2024    BUN 26 (H) 02/15/2024    ANIONGAP 15 02/15/2024    MG 2.00 03/17/2023    PHOS 3.8 02/15/2024    ALBUMIN 4.0 02/15/2024    GFRF 58 (A) 07/19/2023     Lab Results   Component Value Date    TRIG 170 (H) 01/09/2024    CHOL 137 01/09/2024    LDLCALC 60 01/09/2024    HDL 42.8 01/09/2024     No results found for: \"BMCBC\", \"CBCDIF\"   _______________________________________________________________________  ANTICOAGULATION ASSESSMENT    The ASCVD Risk score (Thomas STAPLES, et al., 2019) failed to calculate for the following reasons:    The 2019 ASCVD risk score is only valid for ages 40 to 79    DIAGNOSIS: prevention of nonvalvular atrial fibrilliation stroke and systemic embolism  - Patient is projected to be on anticoagulation indefinitely  - [id number] Score: [6] (only included if diagnosis is atrial fibrillation)   Age: [<65 (0)] [65-74 (+1)] [> 75 (+2)]: 2  Sex: [Male/Female (+1)]: 1  CHF history: [No/Yes(+1)]: 1  Hypertension history: [No/Yes(+1)]: " "1  Stroke/TIA/thromboembolism history: [No/Yes(+2)]: 0  Vascular disease history (prior MI, peripheral artery disease, aortic plaque): [No/Yes(+1)]: 0  Diabetes history: [No/Yes(+1)]: 1    CURRENT PHARMACOTHERAPY:   Eliquis 2.5mg BID  84 y/o  89 kg  Scr 1.21  Provider prefers lower dose due to kidney function    UPDATE ON PHARMACOTHERAPY:   Affordability/Accessibility: [facility] PAP renewal 8/4/2024  Adherence/Organization: taking BID   Adverse Effects: reports no bleeding or bruising     Recent Hospitalizations: none   Recent Falls/Trauma: none   Changes in Tobacco or Alcohol Intake:    - Tobacco: none    - Alcohol: none   _______________________________________________________________________  CHF ASSESSMENT     Symptom/Staging:  -Most recent ejection fraction: 55-60%  -NYHA Stage: unknown     Guideline-Directed Medical Therapy:  -ARNI: No              -If no, then ACEi/ARB?: Yes, describe: losartan 100mg  -Beta Blocker: Yes, describe: metoprolol tartrate 25mg  -MRA: No  -SGLT2i: Yes, describe: Jardiance 25mg     Secondary Prevention:  -The ASCVD Risk score (Thomas STAPLES, et al., 2019) failed to calculate for the following reasons:    The 2019 ASCVD risk score is only valid for ages 40 to 79   -Aspirin 81mg? yes  -Statin?: Yes, describe: Rosuvastatin 40mg  -HTN?: Yes, describe: losartan 100mg, metoprolol tartrate 25mg  Lasix 40mg as needed for edema  At the most once a week, sometimes every 2 weeks  Legs hurt more when swelling increases    CURRENT PHARMACOTHERAPY:   Jardiance 25mg daily      UPDATE ON PHARMACOTHERAPY:   Affordability/Accessibility:  PAP expiration 8/4/2024  Adherence/Organization: no issues reported  Adverse Effects:   Constant heaviness in the legs - feels a little better after taking diuretic, constant pain when occurs, but comes & goes, through middle of leg from knee to ankle. Had for \"a while,\" about a year, sometimes tingling, mostly heaviness  Increased urination when takes both Jardiance and " furosemide (tries not to take unless absolutely needs to)    Monitoring Weights at Home: No  Past In Office Weight Readings: 199lbs (6/25/2024)    Monitoring Blood Pressure at Home: No  Past In Office BP Readings: 157/65 (6/25/2024)    HEALTH MANAGEMENT    Maintaining fluid restriction (<2 L/day): No  Drinks coffee, flavored water (Mike) - stays hydrated while on Jardiance   Edema/swelling: Sometimes, mostly not  Shortness of breath: Yes  Improved since starting Jardiance  Still occurs when walking across the room  Trouble sleeping/lying down: No  Has sleep apnea, uses CPAP  Dry/hacking cough: No  Recent Hospitalizations: No    DISCUSSION/NOTES  Patient expressed concern regarding side effect she heard while watching  a Jardiance commercial recently, and heard about a side effect of leg pain, and wanted to know what type of leg pain may occur, and what to watch out for  Patient reports doing well on Eliquis, with no issues   Only takes furosemide when she has to, as the combination of furosemide and jardiance increases her urination too much    ______________________________________________________________________  PATIENT EDUCATION/GOALS  - Counseled patient on MOA, expectations, duration of therapy, contraindications, administration, and monitoring parameters  - Counseled patient of side effects that are indicative of bleeding such as dark tarry stool, unexplainable bruising, or vomiting up a coffee ground like substance  - Counseled patient on lifestyle modifications that can decrease your risk of having complications (smoking cessation, losing weight, daily weights, vaccines)  - Counseled patient on fluid intake and weight management. Recommended to not consume more than 2 liters of fliuids per day. If they have gained more than 2-3 pounds within a 24 hours period (or 5 pounds in a week), contact their cardiologist  - Answered all patient questions and concerns  - Counseled patient on side effects of Jardiance,  including leg/muscle pain  _______________________________________________________________________  RECOMMENDATIONS/PLAN  Continue Eliquis 5mg BID  Continue Jardiance 25mg daily   Follow up in 3 months  Apply for  PAP renewal     Patient Assistance Program (PAP)    Patient verbally reports monthly or yearly income which is less than 400% federal poverty level    Application for program to be submitted for the following medications: Eliquis , Jardiance    Home County: [facility]  Prescription Insurance: Yes  Members of Household: 2  Files Taxes: Yes    Patient will be faxing financial information to pharmacist directly at [phone].    Patient aware this process may take up to 6 weeks.     If approved medication must be filled through [facility] and MEDICATION WILL BE MAILED TO PATIENT.       Next Cardiology Appointment: 7/18/2024  Clinical Pharmacist follow up: 9/18/2024 @10:30  VAF/Application Expiration: Yes    Date: 8/4/2024  Type of Encounter: Leeann Newby, Demetrius , [facility]  PGY1 [facility] Pharm[facility] Resident     Verbal consent to manage patient's drug therapy was obtained from the patient . They were informed they may decline to participate or withdraw from participation in pharmacy services at any time.    Continue all meds under the continuation of care with the referring provider and clinical pharmacy team.

## 2024-06-26 NOTE — PROGRESS NOTES
I reviewed the progress note and agree with the resident’s findings and plans as written.     Roxann Batres, PharmD

## 2024-06-26 NOTE — Clinical Note
Larry Hadley.  · Patient expressed concern regarding side effect she heard while watching  a Jardiance commercial recently, and heard about a side effect of leg pain, and wanted to know what type of leg pain may occur, and what to watch out for · Patient reports doing well on Eliquis, with no issues  · Only takes furosemide when she has to, as the combination of furosemide and jardiance increases her urination too much

## 2024-07-03 ENCOUNTER — TELEPHONE (OUTPATIENT)
Dept: PRIMARY CARE | Facility: CLINIC | Age: 84
End: 2024-07-03
Payer: MEDICARE

## 2024-07-05 PROCEDURE — RXMED WILLOW AMBULATORY MEDICATION CHARGE

## 2024-07-05 NOTE — ADDENDUM NOTE
Addended by: DOLLY CALVIN on: 7/5/2024 08:03 AM     Modules accepted: Orders     FOB involved, Esteban  · EDC: 8/4/2019  · Accepts Blood: Y  · Anesthesia Consult: N  · 1T Labs: wnl  · Genetic screen: missed window for quad  · Carrier screen: has orders, has not completed  · Anatomy US: anatomy complete and normal  · 3T Labs: normal, Hgb 10.1  · GBBS:  · Immunization:  [x] Flu vaccine  [x]Tdap - accepted 5/31/19  · Contraception: Mirena IUD or Nexplanon  · Breast  · Girl , NAHEED'Stephany

## 2024-07-07 DIAGNOSIS — I48.0 PAROXYSMAL ATRIAL FIBRILLATION (MULTI): ICD-10-CM

## 2024-07-07 DIAGNOSIS — E11.59 TYPE 2 DIABETES MELLITUS WITH OTHER CIRCULATORY COMPLICATIONS (MULTI): ICD-10-CM

## 2024-07-07 DIAGNOSIS — E11.22 CHRONIC KIDNEY DISEASE DUE TO DIABETES MELLITUS (MULTI): ICD-10-CM

## 2024-07-07 DIAGNOSIS — E78.5 HYPERLIPIDEMIA, UNSPECIFIED HYPERLIPIDEMIA TYPE: ICD-10-CM

## 2024-07-07 DIAGNOSIS — E55.9 VITAMIN D DEFICIENCY: Primary | ICD-10-CM

## 2024-07-07 DIAGNOSIS — M35.3 POLYMYALGIA RHEUMATICA (MULTI): ICD-10-CM

## 2024-07-08 ENCOUNTER — HOSPITAL ENCOUNTER (OUTPATIENT)
Dept: CARDIOLOGY | Facility: CLINIC | Age: 84
Discharge: HOME | End: 2024-07-08
Payer: MEDICARE

## 2024-07-08 DIAGNOSIS — I44.2 ATRIOVENTRICULAR BLOCK, COMPLETE (MULTI): ICD-10-CM

## 2024-07-08 DIAGNOSIS — I48.0 PAROXYSMAL ATRIAL FIBRILLATION (MULTI): ICD-10-CM

## 2024-07-08 DIAGNOSIS — Z95.0 PRESENCE OF CARDIAC PACEMAKER: ICD-10-CM

## 2024-07-08 PROCEDURE — 93294 REM INTERROG EVL PM/LDLS PM: CPT | Performed by: INTERNAL MEDICINE

## 2024-07-08 PROCEDURE — 93296 REM INTERROG EVL PM/IDS: CPT

## 2024-07-09 ENCOUNTER — PHARMACY VISIT (OUTPATIENT)
Dept: PHARMACY | Facility: CLINIC | Age: 84
End: 2024-07-09
Payer: MEDICARE

## 2024-07-09 ENCOUNTER — LAB (OUTPATIENT)
Dept: LAB | Facility: LAB | Age: 84
End: 2024-07-09
Payer: MEDICARE

## 2024-07-09 DIAGNOSIS — E11.59 TYPE 2 DIABETES MELLITUS WITH OTHER CIRCULATORY COMPLICATIONS (MULTI): ICD-10-CM

## 2024-07-09 DIAGNOSIS — E78.5 HYPERLIPIDEMIA, UNSPECIFIED HYPERLIPIDEMIA TYPE: ICD-10-CM

## 2024-07-09 DIAGNOSIS — M35.3 POLYMYALGIA RHEUMATICA (MULTI): ICD-10-CM

## 2024-07-09 DIAGNOSIS — I48.0 PAROXYSMAL ATRIAL FIBRILLATION (MULTI): ICD-10-CM

## 2024-07-09 DIAGNOSIS — E11.22 CHRONIC KIDNEY DISEASE DUE TO DIABETES MELLITUS (MULTI): ICD-10-CM

## 2024-07-09 DIAGNOSIS — E55.9 VITAMIN D DEFICIENCY: ICD-10-CM

## 2024-07-09 LAB
25(OH)D3 SERPL-MCNC: 21 NG/ML (ref 30–100)
ALBUMIN SERPL BCP-MCNC: 3.8 G/DL (ref 3.4–5)
ALP SERPL-CCNC: 62 U/L (ref 33–136)
ALT SERPL W P-5'-P-CCNC: 12 U/L (ref 7–45)
ANION GAP SERPL CALC-SCNC: 16 MMOL/L (ref 10–20)
AST SERPL W P-5'-P-CCNC: 17 U/L (ref 9–39)
BILIRUB SERPL-MCNC: 0.3 MG/DL (ref 0–1.2)
BUN SERPL-MCNC: 20 MG/DL (ref 6–23)
CALCIUM SERPL-MCNC: 8.9 MG/DL (ref 8.6–10.6)
CHLORIDE SERPL-SCNC: 108 MMOL/L (ref 98–107)
CHOLEST SERPL-MCNC: 150 MG/DL (ref 0–199)
CHOLESTEROL/HDL RATIO: 3.3
CO2 SERPL-SCNC: 24 MMOL/L (ref 21–32)
CREAT SERPL-MCNC: 1.08 MG/DL (ref 0.5–1.05)
EGFRCR SERPLBLD CKD-EPI 2021: 50 ML/MIN/1.73M*2
ERYTHROCYTE [DISTWIDTH] IN BLOOD BY AUTOMATED COUNT: 14.6 % (ref 11.5–14.5)
EST. AVERAGE GLUCOSE BLD GHB EST-MCNC: 128 MG/DL
GLUCOSE SERPL-MCNC: 105 MG/DL (ref 74–99)
HBA1C MFR BLD: 6.1 %
HCT VFR BLD AUTO: 42 % (ref 36–46)
HDLC SERPL-MCNC: 45.6 MG/DL
HGB BLD-MCNC: 13.1 G/DL (ref 12–16)
LDLC SERPL CALC-MCNC: 75 MG/DL
MCH RBC QN AUTO: 29.8 PG (ref 26–34)
MCHC RBC AUTO-ENTMCNC: 31.2 G/DL (ref 32–36)
MCV RBC AUTO: 96 FL (ref 80–100)
NON HDL CHOLESTEROL: 104 MG/DL (ref 0–149)
NRBC BLD-RTO: 0 /100 WBCS (ref 0–0)
PLATELET # BLD AUTO: 231 X10*3/UL (ref 150–450)
POTASSIUM SERPL-SCNC: 4.2 MMOL/L (ref 3.5–5.3)
PROT SERPL-MCNC: 6.3 G/DL (ref 6.4–8.2)
RBC # BLD AUTO: 4.39 X10*6/UL (ref 4–5.2)
SODIUM SERPL-SCNC: 144 MMOL/L (ref 136–145)
TRIGL SERPL-MCNC: 149 MG/DL (ref 0–149)
TSH SERPL-ACNC: 2.97 MIU/L (ref 0.44–3.98)
VLDL: 30 MG/DL (ref 0–40)
WBC # BLD AUTO: 9.8 X10*3/UL (ref 4.4–11.3)

## 2024-07-09 PROCEDURE — 84443 ASSAY THYROID STIM HORMONE: CPT

## 2024-07-09 PROCEDURE — 36415 COLL VENOUS BLD VENIPUNCTURE: CPT

## 2024-07-09 PROCEDURE — 80053 COMPREHEN METABOLIC PANEL: CPT

## 2024-07-09 PROCEDURE — 85027 COMPLETE CBC AUTOMATED: CPT

## 2024-07-09 PROCEDURE — 80061 LIPID PANEL: CPT

## 2024-07-09 PROCEDURE — 82306 VITAMIN D 25 HYDROXY: CPT

## 2024-07-09 PROCEDURE — 83036 HEMOGLOBIN GLYCOSYLATED A1C: CPT

## 2024-07-10 ENCOUNTER — LAB (OUTPATIENT)
Dept: LAB | Facility: LAB | Age: 84
End: 2024-07-10
Payer: MEDICARE

## 2024-07-10 ENCOUNTER — APPOINTMENT (OUTPATIENT)
Dept: PRIMARY CARE | Facility: CLINIC | Age: 84
End: 2024-07-10
Payer: MEDICARE

## 2024-07-10 VITALS
HEIGHT: 60 IN | OXYGEN SATURATION: 95 % | SYSTOLIC BLOOD PRESSURE: 137 MMHG | HEART RATE: 71 BPM | WEIGHT: 200 LBS | BODY MASS INDEX: 39.27 KG/M2 | DIASTOLIC BLOOD PRESSURE: 59 MMHG

## 2024-07-10 DIAGNOSIS — E11.59 TYPE 2 DIABETES MELLITUS WITH OTHER CIRCULATORY COMPLICATIONS (MULTI): Primary | ICD-10-CM

## 2024-07-10 DIAGNOSIS — E78.5 HYPERLIPIDEMIA, UNSPECIFIED HYPERLIPIDEMIA TYPE: ICD-10-CM

## 2024-07-10 DIAGNOSIS — M81.0 OSTEOPOROSIS, UNSPECIFIED OSTEOPOROSIS TYPE, UNSPECIFIED PATHOLOGICAL FRACTURE PRESENCE: ICD-10-CM

## 2024-07-10 DIAGNOSIS — E11.59 TYPE 2 DIABETES MELLITUS WITH OTHER CIRCULATORY COMPLICATIONS (MULTI): ICD-10-CM

## 2024-07-10 DIAGNOSIS — R35.0 INCREASED URINARY FREQUENCY: ICD-10-CM

## 2024-07-10 DIAGNOSIS — J45.909 ASTHMA, UNSPECIFIED ASTHMA SEVERITY, UNSPECIFIED WHETHER COMPLICATED, UNSPECIFIED WHETHER PERSISTENT (HHS-HCC): ICD-10-CM

## 2024-07-10 DIAGNOSIS — I48.0 PAROXYSMAL ATRIAL FIBRILLATION (MULTI): ICD-10-CM

## 2024-07-10 DIAGNOSIS — Z00.00 MEDICARE ANNUAL WELLNESS VISIT, SUBSEQUENT: ICD-10-CM

## 2024-07-10 DIAGNOSIS — G89.29 CHRONIC LOW BACK PAIN, UNSPECIFIED BACK PAIN LATERALITY, UNSPECIFIED WHETHER SCIATICA PRESENT: ICD-10-CM

## 2024-07-10 DIAGNOSIS — N18.31 STAGE 3A CHRONIC KIDNEY DISEASE (MULTI): ICD-10-CM

## 2024-07-10 DIAGNOSIS — I50.32 CHRONIC DIASTOLIC CONGESTIVE HEART FAILURE (MULTI): ICD-10-CM

## 2024-07-10 DIAGNOSIS — Z00.00 WELLNESS EXAMINATION: ICD-10-CM

## 2024-07-10 DIAGNOSIS — M54.50 CHRONIC LOW BACK PAIN, UNSPECIFIED BACK PAIN LATERALITY, UNSPECIFIED WHETHER SCIATICA PRESENT: ICD-10-CM

## 2024-07-10 DIAGNOSIS — I49.5 SA NODE DYSFUNCTION (MULTI): ICD-10-CM

## 2024-07-10 LAB
APPEARANCE UR: ABNORMAL
BACTERIA #/AREA URNS AUTO: ABNORMAL /HPF
BILIRUB UR STRIP.AUTO-MCNC: NEGATIVE MG/DL
COLOR UR: ABNORMAL
CREAT UR-MCNC: 75.1 MG/DL (ref 20–320)
GLUCOSE UR STRIP.AUTO-MCNC: ABNORMAL MG/DL
KETONES UR STRIP.AUTO-MCNC: NEGATIVE MG/DL
LEUKOCYTE ESTERASE UR QL STRIP.AUTO: ABNORMAL
MICROALBUMIN UR-MCNC: 9.2 MG/L
MICROALBUMIN/CREAT UR: 12.3 UG/MG CREAT
MUCOUS THREADS #/AREA URNS AUTO: ABNORMAL /LPF
NITRITE UR QL STRIP.AUTO: ABNORMAL
PH UR STRIP.AUTO: 5 [PH]
PROT UR STRIP.AUTO-MCNC: NEGATIVE MG/DL
RBC # UR STRIP.AUTO: ABNORMAL /UL
RBC #/AREA URNS AUTO: >20 /HPF
SP GR UR STRIP.AUTO: 1.03
SQUAMOUS #/AREA URNS AUTO: ABNORMAL /HPF
UROBILINOGEN UR STRIP.AUTO-MCNC: NORMAL MG/DL
WBC #/AREA URNS AUTO: >50 /HPF

## 2024-07-10 PROCEDURE — 82043 UR ALBUMIN QUANTITATIVE: CPT

## 2024-07-10 PROCEDURE — 99214 OFFICE O/P EST MOD 30 MIN: CPT | Performed by: STUDENT IN AN ORGANIZED HEALTH CARE EDUCATION/TRAINING PROGRAM

## 2024-07-10 PROCEDURE — 3075F SYST BP GE 130 - 139MM HG: CPT | Performed by: STUDENT IN AN ORGANIZED HEALTH CARE EDUCATION/TRAINING PROGRAM

## 2024-07-10 PROCEDURE — 81001 URINALYSIS AUTO W/SCOPE: CPT

## 2024-07-10 PROCEDURE — 1160F RVW MEDS BY RX/DR IN RCRD: CPT | Performed by: STUDENT IN AN ORGANIZED HEALTH CARE EDUCATION/TRAINING PROGRAM

## 2024-07-10 PROCEDURE — 99397 PER PM REEVAL EST PAT 65+ YR: CPT | Performed by: STUDENT IN AN ORGANIZED HEALTH CARE EDUCATION/TRAINING PROGRAM

## 2024-07-10 PROCEDURE — 82570 ASSAY OF URINE CREATININE: CPT

## 2024-07-10 PROCEDURE — 1036F TOBACCO NON-USER: CPT | Performed by: STUDENT IN AN ORGANIZED HEALTH CARE EDUCATION/TRAINING PROGRAM

## 2024-07-10 PROCEDURE — G0439 PPPS, SUBSEQ VISIT: HCPCS | Performed by: STUDENT IN AN ORGANIZED HEALTH CARE EDUCATION/TRAINING PROGRAM

## 2024-07-10 PROCEDURE — 1124F ACP DISCUSS-NO DSCNMKR DOCD: CPT | Performed by: STUDENT IN AN ORGANIZED HEALTH CARE EDUCATION/TRAINING PROGRAM

## 2024-07-10 PROCEDURE — 1170F FXNL STATUS ASSESSED: CPT | Performed by: STUDENT IN AN ORGANIZED HEALTH CARE EDUCATION/TRAINING PROGRAM

## 2024-07-10 PROCEDURE — 1159F MED LIST DOCD IN RCRD: CPT | Performed by: STUDENT IN AN ORGANIZED HEALTH CARE EDUCATION/TRAINING PROGRAM

## 2024-07-10 PROCEDURE — 3078F DIAST BP <80 MM HG: CPT | Performed by: STUDENT IN AN ORGANIZED HEALTH CARE EDUCATION/TRAINING PROGRAM

## 2024-07-10 ASSESSMENT — ACTIVITIES OF DAILY LIVING (ADL)
DRESSING: INDEPENDENT
BATHING: INDEPENDENT
GROCERY_SHOPPING: INDEPENDENT
DOING_HOUSEWORK: INDEPENDENT
TAKING_MEDICATION: INDEPENDENT
MANAGING_FINANCES: INDEPENDENT

## 2024-07-10 ASSESSMENT — ENCOUNTER SYMPTOMS
LOSS OF SENSATION IN FEET: 0
DEPRESSION: 0
OCCASIONAL FEELINGS OF UNSTEADINESS: 1

## 2024-07-10 ASSESSMENT — PATIENT HEALTH QUESTIONNAIRE - PHQ9
2. FEELING DOWN, DEPRESSED OR HOPELESS: NOT AT ALL
1. LITTLE INTEREST OR PLEASURE IN DOING THINGS: NOT AT ALL
SUM OF ALL RESPONSES TO PHQ9 QUESTIONS 1 AND 2: 0

## 2024-07-10 NOTE — PROGRESS NOTES
Subjective   Reason for Visit: Marcelle Hewitt is an 83 y.o. female here for a Medicare Wellness visit.          Reviewed all medications by prescribing practitioner or clinical pharmacist (such as prescriptions, OTCs, herbal therapies and supplements) and documented in the medical record.    HPI    Patient Care Team:  Christopher D'Amico, DO as PCP - General  Christopher D'Amico, DO as PCP - [employer] PCP     Review of Systems    Objective   Vitals:  Ht 1.524 m (5')   BMI 38.90 kg/m²       Physical Exam    Assessment/Plan   Problem List Items Addressed This Visit    None           83-year-old female presenting for follow-up on multiple chronic conditions, Medicare annual wellness exam/CPE.    Chronic back pain, chronic knee pain  Follows with orthopedics for the knees, quarterly injections.  For the low back, previously saw pain management, had recommended procedure, but insurance would not cover.  Has never done physical therapy for her back.    A-fib, SA node dysfunction, CHF, HLD  Stable, tolerating current regimen well.  Asymptomatic.    DMII, CKD 3A, diabetic neuropathy  Stable on SGLT2 only.  A1c generally at goal.       Asthma  Stable without any medication    Osteoporosis  Stable, on Prolia every 6 months    Increased urinary frequency  Has been going on for several months, has urgency with minimal urination.  Many years ago saw urology, and had procedure done that improved the situation.    12 point ROS reviewed and negative other than as stated in HPI      General: Alert, oriented, pleasant, in no acute distress  HEENT:      Head: normocephalic, atraumatic;      eyes: EOMI, no scleral icterus;   Neck: soft, supple, non-tender, no masses appreciated  CV: Heart with regular rate and rhythm, normal S1/S2, no murmurs  Lungs: CTAB without wheezing, rhonchi or rales; good respiratory effort, no increased work of breathing  Extremities: Trace-+1 edema bilaterally  Neuro: Cranial nerves  grossly intact; alert and oriented  Psych: Appropriate mood and affect    #HM  -Labs done before visit reviewed with patient.  -Vaccines:       Flu: Out of season      Shingrix: Recommended, advised to go to local pharmacy      Pneumococcal: UTD      Tdap: Recommended, advised to go to local pharmacy  -Manjeet w/ roney: no longer screening  -Colonoscopy: no longer screening  -Osteoporosis screening: DEXA scan 10/2023    #DMII #CKD 3 #Diabetic neuropathy  - Last A1c 6.1, 07/2024  -Albumin negative in 01/2024  -Last GFR 50  -UTD eye exam, podiatry  -Continue Jardiance 25 mg qd  -Avoid nephrotoxic drugs, and stay adequately hydrated    # A-fib #SA node dysfunction #CHF  -Sinus rhythm on auscultation, feels well, stable  -Continue metoprolol tartrate 25 mg BID, flecainide 50 mg BID, Eliquis 2.5 mg BID  -Furosemide 40 mg as needed, losartan 100 mg, Jardiance 25 mg  -Continue to follow with cardiology    #HLD  -Continue rosuvastatin 40 mg daily  -Last LDL 75, HDL 45.6, triglycerides 149    # Chronic back pain #chronic knee pain  -Continue to follow with ortho  - Physical therapy ordered for back    # Asthma  -Stable without any medication    #Osteoporosis  -Continue Prolia 60 mg every 6 months    #Increased urinary frequency  -Start with urinalysis and culture  - Consider pelvic floor therapy    F/U 6 months or sooner if indicated    Chris D'Amico, DO

## 2024-07-15 PROCEDURE — RXMED WILLOW AMBULATORY MEDICATION CHARGE

## 2024-07-17 ENCOUNTER — PHARMACY VISIT (OUTPATIENT)
Dept: PHARMACY | Facility: CLINIC | Age: 84
End: 2024-07-17
Payer: MEDICARE

## 2024-07-17 ENCOUNTER — APPOINTMENT (OUTPATIENT)
Dept: ORTHOPEDIC SURGERY | Facility: CLINIC | Age: 84
End: 2024-07-17
Payer: MEDICARE

## 2024-07-17 VITALS — WEIGHT: 200 LBS | BODY MASS INDEX: 39.27 KG/M2 | HEIGHT: 60 IN

## 2024-07-17 DIAGNOSIS — M17.0 PRIMARY OSTEOARTHRITIS OF BOTH KNEES: Primary | ICD-10-CM

## 2024-07-17 PROCEDURE — 20610 DRAIN/INJ JOINT/BURSA W/O US: CPT | Performed by: ORTHOPAEDIC SURGERY

## 2024-07-17 PROCEDURE — 1159F MED LIST DOCD IN RCRD: CPT | Performed by: ORTHOPAEDIC SURGERY

## 2024-07-17 PROCEDURE — 1036F TOBACCO NON-USER: CPT | Performed by: ORTHOPAEDIC SURGERY

## 2024-07-17 RX ORDER — LIDOCAINE HYDROCHLORIDE 10 MG/ML
4 INJECTION INFILTRATION; PERINEURAL
Status: COMPLETED | OUTPATIENT
Start: 2024-07-17 | End: 2024-07-17

## 2024-07-17 RX ORDER — TRIAMCINOLONE ACETONIDE 40 MG/ML
1 INJECTION, SUSPENSION INTRA-ARTICULAR; INTRAMUSCULAR
Status: COMPLETED | OUTPATIENT
Start: 2024-07-17 | End: 2024-07-17

## 2024-07-17 ASSESSMENT — PAIN - FUNCTIONAL ASSESSMENT: PAIN_FUNCTIONAL_ASSESSMENT: 0-10

## 2024-07-17 ASSESSMENT — PAIN SCALES - GENERAL: PAINLEVEL_OUTOF10: 5 - MODERATE PAIN

## 2024-07-17 NOTE — PROGRESS NOTES
L Inj/Asp: bilateral knee on 7/17/2024 1:50 PM  Indications: pain and joint swelling  Details: 22 G needle, anterolateral approach  Medications (Right): 1 mL triamcinolone acetonide 40 mg/mL; 4 mL lidocaine 10 mg/mL (1 %)  Medications (Left): 1 mL triamcinolone acetonide 40 mg/mL; 4 mL lidocaine 10 mg/mL (1 %)  Outcome: tolerated well, no immediate complications    Discussion:  I discussed the conservative treatment options for knee osteoarthritis including but not limited to physical therapy, oral NSAIDS, activity and lifestyle modification, and corticosteroid injections. Pt has elected to undergo a cortisone injection today. I have explained the risk and benefits of an injection including the possibility of joint infection, bleeding, damage to cartilage, allergic reaction. Patient verbalized understanding and gave verbal consent wishes to proceed with a intra-articular cortisone injection for their knee.    Procedure:  After discussing the risk and benefits of the procedure, we proceeded with an intra-articular bilateral knee injection.    With the patient's informed verbal consent, the bilateral knees were prepped in standard sterile fashion with Chlorhexidine. The skin was then anesthetized with ethyl chloride spray and cleaned again with Chlorhexidine. The bilateral knees were then apirated/injected with a prefilled 20-gauge syringe of 40 mg Kenalog + 4 ml Lidocaine using the lateral approach without complications.  The patient tolerated this well and felt immediate initial relief of symptoms. A bandaid was applied and the patient ambulated out of the clinic on ther own accord without difficulty. Patient was instructed to avoid physical activity for 24-48 hours to prevent the knees from swelling and may ice the knees as tolerated. Patient should contact the office if any signs of of infection appear: redness, fever, chills, drainage, swelling or warmth to the knees.  Pt understands that the injections can be  repeated no sooner than 3 months.      Procedure, treatment alternatives, risks and benefits explained, specific risks discussed. Consent was given by the patient. Immediately prior to procedure a time out was called to verify the correct patient, procedure, equipment, support staff and site/side marked as required. Patient was prepped and draped in the usual sterile fashion.

## 2024-07-18 ENCOUNTER — APPOINTMENT (OUTPATIENT)
Dept: CARDIOLOGY | Facility: CLINIC | Age: 84
End: 2024-07-18
Payer: MEDICARE

## 2024-07-22 ENCOUNTER — TELEPHONE (OUTPATIENT)
Dept: PRIMARY CARE | Facility: CLINIC | Age: 84
End: 2024-07-22
Payer: MEDICARE

## 2024-07-22 NOTE — TELEPHONE ENCOUNTER
----- Message from Christopher D'Amico sent at 7/22/2024  8:59 AM EDT -----  Urinalysis shows possible even likely infection, I have been waiting on urine culture, and it does not appear the lab is going to do it.  If still symptomatic, we can send empiric antibiotic to try.

## 2024-07-22 NOTE — TELEPHONE ENCOUNTER
Result Communication    Resulted Orders   Urinalysis with Reflex Microscopic   Result Value Ref Range    Color, Urine Light-Yellow Light-Yellow, Yellow, Dark-Yellow    Appearance, Urine Turbid (N) Clear    Specific Gravity, Urine 1.026 1.005 - 1.035    pH, Urine 5.0 5.0, 5.5, 6.0, 6.5, 7.0, 7.5, 8.0    Protein, Urine NEGATIVE NEGATIVE, 10 (TRACE), 20 (TRACE) mg/dL    Glucose, Urine OVER (4+) (A) Normal mg/dL    Blood, Urine 0.2 (2+) (A) NEGATIVE    Ketones, Urine NEGATIVE NEGATIVE mg/dL    Bilirubin, Urine NEGATIVE NEGATIVE    Urobilinogen, Urine Normal Normal mg/dL    Nitrite, Urine 2+ (A) NEGATIVE    Leukocyte Esterase, Urine 500 Patricia/µL (A) NEGATIVE   Albumin-Creatinine Ratio, Urine Random   Result Value Ref Range    Albumin, Urine Random 9.2 Not established mg/L    Creatinine, Urine Random 75.1 20.0 - 320.0 mg/dL    Albumin/Creatinine Ratio 12.3 <30.0 ug/mg Creat   Microscopic Only, Urine   Result Value Ref Range    WBC, Urine >50 (A) 1-5, NONE /HPF    RBC, Urine >20 (A) NONE, 1-2, 3-5 /HPF    Squamous Epithelial Cells, Urine 1-9 (SPARSE) Reference range not established. /HPF    Bacteria, Urine 3+ (A) NONE SEEN /HPF    Mucus, Urine FEW Reference range not established. /LPF       2:19 PM      Results were not successfully communicated with the patient and they did not acknowledge their understanding.

## 2024-07-23 DIAGNOSIS — N39.0 URINARY TRACT INFECTION WITHOUT HEMATURIA, SITE UNSPECIFIED: Primary | ICD-10-CM

## 2024-07-23 RX ORDER — NITROFURANTOIN 25; 75 MG/1; MG/1
100 CAPSULE ORAL 2 TIMES DAILY
Qty: 10 CAPSULE | Refills: 0 | Status: SHIPPED | OUTPATIENT
Start: 2024-07-23 | End: 2024-07-25 | Stop reason: SDUPTHER

## 2024-07-23 NOTE — TELEPHONE ENCOUNTER
Result Communication    Resulted Orders   Urinalysis with Reflex Microscopic   Result Value Ref Range    Color, Urine Light-Yellow Light-Yellow, Yellow, Dark-Yellow    Appearance, Urine Turbid (N) Clear    Specific Gravity, Urine 1.026 1.005 - 1.035    pH, Urine 5.0 5.0, 5.5, 6.0, 6.5, 7.0, 7.5, 8.0    Protein, Urine NEGATIVE NEGATIVE, 10 (TRACE), 20 (TRACE) mg/dL    Glucose, Urine OVER (4+) (A) Normal mg/dL    Blood, Urine 0.2 (2+) (A) NEGATIVE    Ketones, Urine NEGATIVE NEGATIVE mg/dL    Bilirubin, Urine NEGATIVE NEGATIVE    Urobilinogen, Urine Normal Normal mg/dL    Nitrite, Urine 2+ (A) NEGATIVE    Leukocyte Esterase, Urine 500 Patricia/µL (A) NEGATIVE   Albumin-Creatinine Ratio, Urine Random   Result Value Ref Range    Albumin, Urine Random 9.2 Not established mg/L    Creatinine, Urine Random 75.1 20.0 - 320.0 mg/dL    Albumin/Creatinine Ratio 12.3 <30.0 ug/mg Creat   Microscopic Only, Urine   Result Value Ref Range    WBC, Urine >50 (A) 1-5, NONE /HPF    RBC, Urine >20 (A) NONE, 1-2, 3-5 /HPF    Squamous Epithelial Cells, Urine 1-9 (SPARSE) Reference range not established. /HPF    Bacteria, Urine 3+ (A) NONE SEEN /HPF    Mucus, Urine FEW Reference range not established. /LPF       10:43 AM      Results were successfully communicated with the patient and they acknowledged their understanding.

## 2024-07-25 DIAGNOSIS — N39.0 URINARY TRACT INFECTION WITHOUT HEMATURIA, SITE UNSPECIFIED: ICD-10-CM

## 2024-07-25 RX ORDER — NITROFURANTOIN 25; 75 MG/1; MG/1
100 CAPSULE ORAL 2 TIMES DAILY
Qty: 10 CAPSULE | Refills: 0 | Status: SHIPPED | OUTPATIENT
Start: 2024-07-25 | End: 2024-07-30

## 2024-07-25 RX ORDER — NITROFURANTOIN 25; 75 MG/1; MG/1
100 CAPSULE ORAL 2 TIMES DAILY
Qty: 10 CAPSULE | Refills: 0 | Status: SHIPPED | OUTPATIENT
Start: 2024-07-25 | End: 2024-07-25

## 2024-07-29 ENCOUNTER — TELEPHONE (OUTPATIENT)
Dept: PRIMARY CARE | Facility: CLINIC | Age: 84
End: 2024-07-29
Payer: MEDICARE

## 2024-07-29 DIAGNOSIS — N39.0 URINARY TRACT INFECTION WITHOUT HEMATURIA, SITE UNSPECIFIED: Primary | ICD-10-CM

## 2024-07-29 RX ORDER — CEPHALEXIN 500 MG/1
500 CAPSULE ORAL 2 TIMES DAILY
Qty: 14 CAPSULE | Refills: 0 | Status: SHIPPED | OUTPATIENT
Start: 2024-07-29 | End: 2024-08-05

## 2024-07-29 NOTE — TELEPHONE ENCOUNTER
Patient called asking if you would send in a different atb for her uti; the previous one made the patient sick after only (3) doses.

## 2024-08-01 ENCOUNTER — TELEPHONE (OUTPATIENT)
Dept: PRIMARY CARE | Facility: CLINIC | Age: 84
End: 2024-08-01
Payer: MEDICARE

## 2024-08-01 NOTE — TELEPHONE ENCOUNTER
Patient was prescribed Augmentin for some dental issues; patient wants to know if she can take that along with the keflex

## 2024-08-07 DIAGNOSIS — R00.1 BRADYCARDIA ON ECG: ICD-10-CM

## 2024-08-07 DIAGNOSIS — Z95.0 PACEMAKER: Primary | ICD-10-CM

## 2024-08-15 ENCOUNTER — HOSPITAL ENCOUNTER (OUTPATIENT)
Dept: CARDIOLOGY | Facility: CLINIC | Age: 84
Discharge: HOME | End: 2024-08-15
Payer: MEDICARE

## 2024-08-15 DIAGNOSIS — I44.2 ATRIOVENTRICULAR BLOCK, COMPLETE (MULTI): ICD-10-CM

## 2024-08-15 DIAGNOSIS — Z95.0 PRESENCE OF CARDIAC PACEMAKER: ICD-10-CM

## 2024-08-15 DIAGNOSIS — I48.0 PAROXYSMAL ATRIAL FIBRILLATION (MULTI): ICD-10-CM

## 2024-08-15 PROCEDURE — 93280 PM DEVICE PROGR EVAL DUAL: CPT

## 2024-08-15 PROCEDURE — 93280 PM DEVICE PROGR EVAL DUAL: CPT | Performed by: INTERNAL MEDICINE

## 2024-09-09 ENCOUNTER — TELEPHONE (OUTPATIENT)
Dept: PRIMARY CARE | Facility: CLINIC | Age: 84
End: 2024-09-09
Payer: MEDICARE

## 2024-09-09 NOTE — TELEPHONE ENCOUNTER
Patient called again today stating that she doesn't think her UTI ever cleared up, how would you like me to advise?

## 2024-09-10 DIAGNOSIS — N39.0 URINARY TRACT INFECTION WITHOUT HEMATURIA, SITE UNSPECIFIED: ICD-10-CM

## 2024-09-10 DIAGNOSIS — R35.0 INCREASED URINARY FREQUENCY: Primary | ICD-10-CM

## 2024-09-10 RX ORDER — AMOXICILLIN AND CLAVULANATE POTASSIUM 875; 125 MG/1; MG/1
875 TABLET, FILM COATED ORAL 2 TIMES DAILY
Qty: 20 TABLET | Refills: 0 | Status: SHIPPED | OUTPATIENT
Start: 2024-09-10 | End: 2024-09-20

## 2024-09-18 ENCOUNTER — APPOINTMENT (OUTPATIENT)
Dept: PHARMACY | Facility: HOSPITAL | Age: 84
End: 2024-09-18
Payer: MEDICARE

## 2024-09-18 DIAGNOSIS — I50.32 CHRONIC DIASTOLIC CONGESTIVE HEART FAILURE: ICD-10-CM

## 2024-09-18 DIAGNOSIS — I48.91 ATRIAL FIBRILLATION, UNSPECIFIED TYPE (MULTI): ICD-10-CM

## 2024-09-18 NOTE — Clinical Note
Nighat Caal,  I spoke with Marcelle today about her Eliquis and heart failure medications. Patient reports doing well on anticoagulation, however has had 3 falls in the past month states without injury or hitting her head. Patient did not go get checked out at ER and denies s/s of bleeding. Patient attributes these falls to her legs being weak, not dizziness. Marcelle also reports feeling dizzy and lightheadedness about 1-2x/ week. Patient states she thinks this is from dehydration, not medications and once she sits down and drinks water, this feeling goes away. Plan to continue current medication regimen given falls and complaints of lightheaded/dizziness. We will follow up with her in January. Thank you!

## 2024-09-18 NOTE — ASSESSMENT & PLAN NOTE
Patient currently on 3 of 4 GDMT medications. Recent renal function and potassium level appropriate to continue current GDMT regimen.   Notification Instructions: Patient will be notified of biopsy results. However, patient instructed to call the office if not contacted within 2 weeks.

## 2024-09-18 NOTE — PROGRESS NOTES
Pharmacist Clinic: Cardiology Management    Marcelle Hewitt is a 83 y.o. female was referred to Clinical Pharmacy Team for anticoagulation and heart failure management.     Referring Provider: Afua Best APRN*    THIS IS A FOLLOW UP PATIENT APPOINTMENT. AT LAST VISIT ON 06/26/24 WITH PHARMACIST (Ashley Newby).    Appointment was completed by Marcelle who was reached at primary number.    REVIEW OF PAST APPNT (IF APPLICABLE):   Patient expressed concern regarding side effect she heard while watching  a Jardiance commercial recently, and heard about a side effect of leg pain, and wanted to know what type of leg pain may occur, and what to watch out for.  Patient reports doing well on Eliquis, with no issues.     Allergies Reviewed? No    Allergies   Allergen Reactions    Macrobid [Nitrofurantoin Monohyd/M-Cryst] Nausea/vomiting    Niacin Unknown    Pregabalin Unknown    Sulfa (Sulfonamide Antibiotics) Unknown       Past Medical History:   Diagnosis Date    Abnormal finding of blood chemistry, unspecified 07/08/2022    Abnormal blood chemistry    Anesthesia of skin 11/12/2020    Numbness of foot    Bilateral primary osteoarthritis of hip 03/01/2021    Osteoarthritis, hip, bilateral    Dermatochalasis of unspecified eye, unspecified eyelid 05/06/2019    Dermatochalasis    Dry eye syndrome of bilateral lacrimal glands 03/10/2022    Dry eyes    Dry eye syndrome of unspecified lacrimal gland 04/29/2016    Dry eye syndrome    Encounter for prophylactic measures, unspecified 11/12/2020    Need for prophylactic measure    Encounter for screening mammogram for malignant neoplasm of breast     Visit for screening mammogram    Headache, unspecified 07/02/2021    Morning headache    History of falling 07/12/2017    History of fall    Keratoconjunctivitis sicca, not specified as Sjogren's, bilateral 03/10/2022    Keratoconjunctivitis sicca of both eyes not due to Sjogren's syndrome    Meibomian gland dysfunction left  Called patient to schedule MFM appointment, based on referral issued to Maternal Fetal Medicine by [facility] office. Left voicemail requesting patient to call back and schedule appointment, with office number for return call [phone]. eye, upper and lower eyelids 03/10/2022    Meibomian gland dysfunction (MGD) of upper and lower eyelid of left eye    Meibomian gland dysfunction right eye, upper and lower eyelids 03/10/2022    Meibomian gland dysfunction (MGD) of upper and lower eyelid of right eye    Menopausal and female climacteric states 04/29/2016    Postmenopausal disorder    Morbid (severe) obesity due to excess calories (Multi) 07/08/2022    Class 2 severe obesity with serious comorbidity in adult    Myopia, bilateral 03/10/2022    Myopia with presbyopia of both eyes    Myopia, right eye 03/10/2022    Myopia of right eye    Nonexudative age-related macular degeneration, bilateral, early dry stage 05/06/2019    Early dry stage nonexudative age-related macular degeneration of both eyes    Obesity, unspecified 12/02/2020    Obesity (BMI 30-39.9)    Obstructive sleep apnea (adult) (pediatric) 09/21/2017    ALEXANDER on CPAP    Other abnormalities of breathing 12/20/2021    Difficulty breathing    Other conditions influencing health status     DEXA Body Composition Study    Other conditions influencing health status     History of pregnancy    Other fracture of upper and lower end of left fibula, subsequent encounter for closed fracture with routine healing 06/04/2019    Closed fracture of distal end of left fibula with routine healing, unspecified fracture morphology, subsequent encounter    Other headache syndrome 07/13/2016    Other headache syndrome    Other secondary cataract, unspecified eye     PCO (posterior capsular opacification)    Other specified abnormal findings of blood chemistry 04/08/2020    Elevated serum creatinine    Other symptoms and signs involving the musculoskeletal system 09/08/2021    Weakness of both lower extremities    Other vitreous opacities, unspecified eye     Floaters    Pain in leg, unspecified 11/13/2019    Leg pain    Pain in unspecified limb 09/14/2017    Limb pain    Personal history of diseases of the skin and  subcutaneous tissue 04/11/2019    History of cellulitis    Personal history of other diseases of the musculoskeletal system and connective tissue 07/25/2017    History of neck pain    Personal history of other diseases of the musculoskeletal system and connective tissue 07/12/2017    History of muscle spasm    Personal history of other diseases of the musculoskeletal system and connective tissue 06/27/2019    History of osteoporosis    Personal history of other diseases of the musculoskeletal system and connective tissue 03/02/2021    History of spinal stenosis    Personal history of other diseases of the nervous system and sense organs 03/10/2022    History of blepharitis    Personal history of other diseases of the nervous system and sense organs 11/16/2017    History of sciatica    Personal history of other diseases of the nervous system and sense organs 04/17/2019    History of iritis    Personal history of other drug therapy 10/06/2021    History of influenza vaccination    Personal history of other medical treatment 02/22/2021    History of screening mammography    Personal history of other specified conditions 10/06/2021    History of excessive sweating    Personal history of other specified conditions 02/15/2022    History of headache    Personal history of other specified conditions 12/08/2020    History of shortness of breath    Personal history of other specified conditions 12/19/2017    History of urinary frequency    Personal history of other specified conditions 04/03/2018    History of edema    Personal history of other specified conditions 07/08/2022    History of edema    Personal history of other specified conditions 04/12/2019    History of chronic pain    Personal history of urinary (tract) infections 07/15/2021    History of urinary tract infection    Polyosteoarthritis, unspecified 11/12/2020    Osteoarthritis of multiple joints    Presence of intraocular lens     Presence of artificial  intra-ocular lens    Presence of intraocular lens 03/10/2022    Pseudophakia of both eyes    Presence of spectacles and contact lenses     Wears eyeglasses    Shortness of breath 06/30/2022    SOB (shortness of breath) on exertion    Spinal stenosis, lumbar region without neurogenic claudication 02/15/2022    Lumbar canal stenosis    Unilateral primary osteoarthritis, left knee 05/07/2020    Primary osteoarthritis of left knee    Unspecified disorder of refraction 03/10/2022    Refractive error    Unspecified epiphora, unspecified side     Eye tearing    Urinary tract infection, site not specified 12/19/2017    Recurrent UTI       Current Outpatient Medications on File Prior to Visit   Medication Sig Dispense Refill    amoxicillin-pot clavulanate (Augmentin) 875-125 mg tablet Take 1 tablet (875 mg) by mouth 2 times a day for 10 days. 20 tablet 0    apixaban (Eliquis) 2.5 mg tablet Take 1 tablet (2.5 mg) by mouth 2 times a day. 180 tablet 3    aspirin 81 mg EC tablet Take 1 tablet (81 mg) by mouth once daily.      calcium phosphate-vitamin D3 250 mg-12.5 mcg (500 unit) tablet,chewable TAKE BY MOUTH AS DIRECTED      cholecalciferol (Vitamin D-3) 50 mcg (2,000 unit) capsule Take 1 capsule (50 mcg) by mouth once daily.      denosumab (Prolia) 60 mg/mL syringe 60 mg subq every 6 months 1 mL 3    diclofenac sodium (Voltaren) 1 % gel gel       empagliflozin (Jardiance) 25 mg Take 1 tablet (25 mg) by mouth once daily. 90 tablet 3    esomeprazole (NexIUM) 20 mg DR capsule Take 1 capsule (20 mg) by mouth once daily.      flecainide (Tambocor) 50 mg tablet Take 1 tablet (50 mg) by mouth 2 times a day. 180 tablet 3    furosemide (Lasix) 40 mg tablet Take 1 tablet (40 mg) by mouth once daily. PRN swelling      losartan (Cozaar) 100 mg tablet Take 1 tablet (100 mg) by mouth once daily. 90 tablet 3    lubricating eye drops ophthalmic solution 1 drop if needed for dry eyes.      metoprolol tartrate (Lopressor) 25 mg tablet Take 1  "tablet (25 mg) by mouth 2 times a day. 180 tablet 3    rosuvastatin (Crestor) 40 mg tablet Take 1 tablet (40 mg) by mouth once daily. 90 tablet 3     No current facility-administered medications on file prior to visit.         RELEVANT LAB RESULTS  Lab Results   Component Value Date    BILITOT 0.3 07/09/2024    CALCIUM 8.9 07/09/2024    CO2 24 07/09/2024     (H) 07/09/2024    CREATININE 1.08 (H) 07/09/2024    GLUCOSE 105 (H) 07/09/2024    ALKPHOS 62 07/09/2024    K 4.2 07/09/2024    PROT 6.3 (L) 07/09/2024     07/09/2024    AST 17 07/09/2024    ALT 12 07/09/2024    BUN 20 07/09/2024    ANIONGAP 16 07/09/2024    MG 2.00 03/17/2023    PHOS 3.8 02/15/2024    ALBUMIN 3.8 07/09/2024    GFRF 58 (A) 07/19/2023     Lab Results   Component Value Date    TRIG 149 07/09/2024    CHOL 150 07/09/2024    LDLCALC 75 07/09/2024    HDL 45.6 07/09/2024     No results found for: \"BMCBC\", \"CBCDIF\"     PHARMACEUTICAL ASSESSMENT    MEDICATION RECONCILIATION    Drug Interactions? No    Medication Documentation Review Audit       Reviewed by Octavia Peng on 07/17/24 at 1341      Medication Order Taking? Sig Documenting Provider Last Dose Status   apixaban (Eliquis) 2.5 mg tablet [id number] No Take 1 tablet (2.5 mg) by mouth 2 times a day. Afua Best, APRN-CNP Taking Active   aspirin 81 mg EC tablet [id number] No Take 1 tablet (81 mg) by mouth once daily. Historical Provider, MD Taking Active   calcium phosphate-vitamin D3 250 mg-12.5 mcg (500 unit) tablet,chewable [id number] No TAKE BY MOUTH AS DIRECTED Historical Provider, MD Taking Active   cholecalciferol (Vitamin D-3) 50 mcg (2,000 unit) capsule [id number] No Take 1 capsule (50 mcg) by mouth once daily. Historical Provider, MD Taking Active   denosumab (Prolia) 60 mg/mL syringe [id number] No 60 mg subq every 6 months Christopher D'Amico, DO Taking Active   diclofenac sodium (Voltaren) 1 % gel gel [id number] No  Historical Provider, MD Taking Active   empagliflozin (Jardiance) " 25 mg [id number] No Take 1 tablet (25 mg) by mouth once daily. Afua Best, APRN-CNP Taking Active   esomeprazole (NexIUM) 20 mg DR capsule [id number] No Take 1 capsule (20 mg) by mouth once daily. Historical Provider, MD Taking Active   flecainide (Tambocor) 50 mg tablet [id number] No Take 1 tablet (50 mg) by mouth 2 times a day. Sebastian White MD Taking Active   furosemide (Lasix) 40 mg tablet [id number] No Take 1 tablet (40 mg) by mouth once daily. PRN swelling Historical Provider, MD Taking Active   losartan (Cozaar) 100 mg tablet [id number] No Take 1 tablet (100 mg) by mouth once daily. Balwinder Lawler MD Taking Active   lubricating eye drops ophthalmic solution [id number] No 1 drop if needed for dry eyes. Historical Provider, MD Taking Active   metoprolol tartrate (Lopressor) 25 mg tablet [id number] No Take 1 tablet (25 mg) by mouth 2 times a day. Sebastian White MD Taking Active   rosuvastatin (Crestor) 40 mg tablet [id number] No Take 1 tablet (40 mg) by mouth once daily. Balwinder Lawler MD Taking Active                    DISEASE MANAGEMENT ASSESSMENT:     ANTICOAGULATION ASSESSMENT    The ASCVD Risk score (Thomas DK, et al., 2019) failed to calculate for the following reasons:    The 2019 ASCVD risk score is only valid for ages 40 to 79    DIAGNOSIS: prevention of nonvalvular atrial fibrilliation stroke and systemic embolism  - Patient is projected to be on anticoagulation indefinitely  - [id number] Score: [6] (only included if diagnosis is atrial fibrillation)   Age: [<65 (0)] [65-74 (+1)] [> 75 (+2)]: 2  Sex: [Male/Female (+1)]: 1  CHF history: [No/Yes(+1)]: 1  Hypertension history: [No/Yes(+1)]: 1  Stroke/TIA/thromboembolism history: [No/Yes(+2)]: 0  Vascular disease history (prior MI, peripheral artery disease, aortic plaque): [No/Yes(+1)]: 0  Diabetes history: [No/Yes(+1)]: 1    CURRENT PHARMACOTHERAPY:    Eliquis 2.5mg twice daily  82yo  90.7kg  Scr 1.08 (7/9/24)  Provider prefers lower dose due to  kidney function     RELEVANT PAST MEDICAL HISTORY:   Afib, HTN, CHF, HLD    Affordability/Accessibility:  PAP   Adherence/Organization: reports adherence, uses pillbox  Adverse Reactions: none reported  Recent Hospitalizations: none  Recent Falls/Trauma: Yes; 3 falls in the last month.   Changes in Tobacco or Alcohol Intake:   Tobacco: does not use  Alcohol: seldom    EDUCATION/COUNSELING:   - Counseled patient on MOA, expectations, duration of therapy, contraindications, administration, and monitoring parameters  - Counseled patient of side effects that are indicative of bleeding such as dark tarry stool, unexplainable bruising, or vomiting up a coffee ground like substance  -Counseled patient on the importance of going to the ER/seeing a provider after falls or physical trauma.     CHF ASSESSMENT     Symptom/Staging:  -Most recent ejection fraction: 55-60%  -NYHA Stage: unknown    Results for orders placed in visit on 04/12/22    Echocardiogram    Narrative  [facility], [address] [place] [address]  Tel [phone] and Fax [phone]    TRANSTHORACIC ECHOCARDIOGRAM REPORT      Patient Name:     DANETTE COHEN Reading Physician:   [id number] Anjelica Perze MD  Study Date:       4/12/2022          Referring Physician: ANJELICA PEREZ  MRN/PID:          [id number]           PCP:  Accession/Order#: [id number]       Department Location: [facility]  YOB: 1940          Fellow:  Gender:           F                  Nurse:  Admit Date:                          Sonographer:         Ender Knight RD  Admission Status: Outpatient         Additional Staff:  Height:           152.40 cm          CC Report to:  Weight:           92.99 kg           Study Type:          Echocardiogram  BSA:              1.89 m2  Blood Pressure: 156 /71 mmHg    Diagnosis/ICD: I49.9-Cardiac arrhythmia, unspecified; R01.1-Cardiac murmur,  unspecified  Indication:    Cardiac arrhythmia; Systolic  murmur  Procedure/CPT: Echo Complete w Full [id number]    Patient History:  Pertinent History: Edema; HTN; SOB; DM II; PPM; chronic diastolic heart failure;  dyspnea; DLD; ALEXANDER; arrhythmia; obesity; systolic murmur.    Study Detail: The following Echo studies were performed: 2D, M-Mode, Doppler and  color flow. Technically challenging study due to body habitus.      PHYSICIAN INTERPRETATION:  Left Ventricle: The left ventricular systolic function is normal, with an estimated ejection fraction of 55-60%. The calculated ejection fraction is normal at 66 % using the Mcnair's Bi-plane MOD calculation. There are no regional wall motion abnormalities. The left ventricular cavity size is normal. Abnormal (paradoxical) septal motion, consistent with RV pacemaker. Spectral Doppler shows a normal pattern of left ventricular diastolic filling. There is mild hypokinesis and dyssynchrony of the anteroseptal wall.  Left Atrium: The left atrium is mild to moderately dilated.  Right Ventricle: The right ventricle is normal in size. There is normal right ventriclar wall thickness. There is normal right ventricular global systolic function. A device is visualized in the right ventricle.  Right Atrium: The right atrium is normal in size. There is a device visualized in the right atrium.  Aortic Valve: The aortic valve appears structurally normal. The aortic valve appears tricuspid and non-restricted. There is no evidence of aortic valve regurgitation. The peak instantaneous gradient of the aortic valve is 20.3 mmHg. The mean gradient of the aortic valve is 12.0 mmHg.  Mitral Valve: The mitral valve is normal in structure. There is normal mitral valve leaflet mobility. There is mild mitral annular calcification. There is mild mitral valve regurgitation.  Tricuspid Valve: The tricuspid valve is structurally normal. There is normal tricuspid valve leaflet mobility. There is mild to moderate tricuspid regurgitation. The Doppler  estimated RVSP is mildly elevated at 44.9 mmHg.  Pulmonic Valve: The pulmonic valve is structurally normal. There is trace pulmonic valve regurgitation.  Pericardium: There is no pericardial effusion noted.  Aorta: The aortic root is normal. The Asc Ao is 2.70 cm. There is no dilatation of the aortic arch. There is no dilatation of the ascending aorta. There is no dilatation of the aortic root. There is plaque visualized in the ascending aorta, which is classified as a Grade 2 [mild (focal or diffuse) intimal thickening of 2-3 mm] atherosclerosis.  Pulmonary Artery: The pulmonary artery is normal in size. The tricuspid regurgitant velocity is 3.24 m/s, and with an estimated right atrial pressure of 3 mmHg, the estimated pulmonary artery pressure is mildly elevated with the RVSP at 41.6 mmHg. The estimated PASP is 45 mmHg.      CONCLUSIONS:  1. The left ventricular systolic function is normal with a 55-60% estimated ejection fraction.  2. Abnormal septal motion consistent with RV pacemaker.  3. There is mild hypokinesis and dyssynchrony of the anteroseptal wall.  4. The left atrium is mild to moderately dilated.  5. Mildly elevated RVSP.  6. There is mild to moderate tricuspid regurgitation.  7. The estimated PASP is 45 mmHg.  8. There is plaque visualized in the ascending aorta.    QUANTITATIVE DATA SUMMARY:  2D MEASUREMENTS:  Normal Ranges:  Ao Root d:     2.80 cm   (2.0-3.7cm)  LAs:           4.27 cm   (2.7-4.0cm)  IVSd:          1.27 cm   (0.6-1.1cm)  LVPWd:         0.88 cm   (0.6-1.1cm)  LVIDd:         4.56 cm   (3.9-5.9cm)  LVIDs:         3.28 cm  LV Mass Index: 91.6 g/m2  LV % FS        28.2 %    LA VOLUME:  Normal Ranges:  LA Vol A4C:       47.7 ml    (22+/-6mL/m2)  LA Vol A2C:       46.4 ml  LA Vol BP:        49.8 ml  LA Vol Index A4C: 25.3ml/m2  LA Vol Index A2C: 24.6 ml/m2  LA Vol Index BP:  26.4 ml/m2  LA Volume Index:  26.3 ml/m2  LA Vol A4C:       43.8 ml  LA Vol A2C:       44.6 ml    RA VOLUME BY A/L  METHOD:  Normal Ranges:  RA Area A4C: 14.2 cm2    AORTA MEASUREMENTS:  Normal Ranges:  Asc Ao, d: 2.70 cm (2.1-3.4cm)    LV SYSTOLIC FUNCTION BY 2D PLANIMETRY (MOD):  Normal Ranges:  EF-A4C View: 69.6 % (>55%)  EF-A2C View: 62.6 %  EF-Biplane:  66.4 %    LV DIASTOLIC FUNCTION:  Normal Ranges:  MV Peak E:      1.14 m/s    (0.7-1.2 m/s)  MV Peak A:      0.85 m/s    (0.42-0.7 m/s)  E/A Ratio:      1.34        (1.0-2.2)  MV e'           0.07 m/s    (>8.0)  MV A Dur:       129.40 msec  E/e' Ratio:     16.34       (<8.0)  MV DT:          205 msec    (150-240 msec)  PulmV Sys [place]:  76.29 cm/s  PulmV Mao [place]: 62.44 cm/s  PulmV S/D [place]:  1.22    MITRAL VALVE:  Normal Ranges:  MV DT: 205 msec (150-240msec)    AORTIC VALVE:  Normal Ranges:  AoV Vmax:                2.25 m/s  (<1.7m/s)  AoV Peak P.3 mmHg (<20mmHg)  AoV Mean P.0 mmHg (1.7-11.5mmHg)  LVOT Max [place]:            1.30 m/s  (<1.1m/s)  AoV VTI:                 52.62 cm  (18-25cm)  LVOT VTI:                31.20 cm  LVOT Diameter:           1.77 cm   (1.8-2.4cm)  AoV Area, VTI:           1.46 cm2  (2.5-5.5cm2)  AoV Area,Vmax:           1.42 cm2  (2.5-4.5cm2)  AoV Dimensionless Index: 0.59    RIGHT VENTRICLE:  RV 1   3.6 cm  RV 2   2.8 cm  RV 3   5.5 cm  TAPSE: 25.0 mm  RV s'  0.12 m/s    TRICUSPID VALVE/RVSP:  Normal Ranges:  Peak TR Velocity: 3.24 m/s  RV Syst Pressure: 44.9 mmHg (< 30mmHg)  IVC Diam:         1.40 cm    PULMONIC VALVE:  Normal Ranges:  PV Max [place]: 1.0 m/s  (0.6-0.9m/s)  PV Max PG:  3.8 mmHg    Pulmonary Veins:  PulmV Mao [place]: 62.44 cm/s  PulmV S/D [place]:  1.22  PulmV Sys [place]:  76.29 cm/s      [id number] Balwinder Lawler MD  Electronically signed on 2022 at 9:52:01 AM         Final       Guideline-Directed Medical Therapy:  -ARNI: No   -If no, then ACEi/ARB?: Yes, describe: Losartan  100mg daily  -Beta Blocker: Yes, describe: metoprolol 25mg twice daily  -MRA: No  -SGLT2i: Yes, describe: Jardiance 25mg daily    Secondary  "Prevention:  -The ASCVD Risk score (Thomas STAPLES, et al., 2019) failed to calculate for the following reasons:    The 2019 ASCVD risk score is only valid for ages 40 to 79   -Aspirin 81mg? yes  -Statin?: Yes, describe: Rosuvastatin 40mg  daily  -HTN?: Yes    CURRENT PHARMACOTHERAPY:   Losartan 100mg daily  Metoprolol tartrate 25mg twice daily  Jardiance 25mg daily  Furosemide 40mg daily PRN     Affordability: [facility]   Adherence/Compliance: reports adherence  Adverse Effects: lightheadedness, every 1-2x per week- patient attributes this to being dehydrated, states lightheadedness not attributable to falls    Monitoring Weights at Home: No- denies noticeable weight fluctuation  Home Weight Recordings: N/A    Past In Office Weight Readings:   Wt Readings from Last 6 Encounters:   07/17/24 90.7 kg (200 lb)   07/10/24 90.7 kg (200 lb)   06/25/24 90.4 kg (199 lb 3.2 oz)   03/21/24 89 kg (196 lb 3.4 oz)   02/27/24 90.1 kg (198 lb 11.2 oz)   02/08/24 88.1 kg (194 lb 3 oz)       Monitoring Blood Pressure at Home: Yes \"every now and again\"- when she feels overheated/ or funny  Home BP Recordings: No BP readings today    Past In Office BP Readings:   BP Readings from Last 6 Encounters:   07/10/24 137/59   06/25/24 157/65   03/21/24 142/62   02/27/24 125/81   02/08/24 128/88   01/10/24 138/60       HEALTH MANAGEMENT    Maintaining fluid restriction (<2 L/day): No  Edema/swelling: Yes -patient states this does not happen frequently, only \"every now and again\". Patient reports her legs get heavy and tight, she will take furosemide and the swelling resolves.  Shortness of breath: Yes on exertion, walking from kitchen to living room-- patient states this has significantly improved since starting Jardiance.  Trouble sleeping/lying down: No  Dry/hacking cough: No  Recent Hospitalizations: No    EDUCATION/COUNSELING:   - Counseled patient on MOA, expectations, duration of therapy, contraindications, administration, and monitoring " parameters  - Counseled patient on lifestyle modifications that can decrease your risk of having complications (smoking cessation, losing weight, daily weights, vaccines)  - Counseled patient on fluid intake and weight management. Recommended to not consume more than 2 liters of fliuids per day. If they have gained more than 2-3 pounds within a 24 hours period (or 5 pounds in a week), contact their cardiologist  - Answered all patient questions and concerns     DISCUSSION/NOTES:   Patient reports doing well on Eliquis, reports adherence, no adverse effects and denies s/s of bleeding  Patient does report falling 3 times in the past month. She states this was on carpet without injury and denies hitting her head. Marcelle states she felt like she did not feel she needed to go get checked out by a provider. We discussed in depth the importance of going to the ER after falls, especially repeated falls and being on a blood thinner. Patient verbalizes understanding. Patient attributes these falls to her legs being weak, not dizziness.  Patient reports feeling dizzy and lightheadedness about 1-2x/ week. Patient states she thinks this is from dehydration and once she sits down and drinks water, this feeling goes away.   Plan to continue current medication regimen given falls and complaints of lightheaded/dizziness.      ASSESSMENT AND PLAN:    Assessment/Plan   Problem List Items Addressed This Visit       Chronic diastolic congestive heart failure (Multi)     Patient currently on 3 of 4 GDMT medications. Recent renal function and potassium level appropriate to continue current GDMT regimen.         Cardiac arrhythmia     Patient age, weight and renal function appropriate for Eliquis 5mg twice daily, however per provider patient needs lower dose due to kidney function (Scr 1.08 7/9/24).                RECOMMENDATIONS/PLAN    CONTINUE  Eliquis 2.5mg twice daily (Provider prefers lower dose due to kidney function)   Losartan  100mg daily  Metoprolol tartrate 25mg twice daily  Jardiance 25mg daily  Follow up in January    Last Appnt with Referring Provider: 06/25/2024  Next Appnt with Referring Provider: 12/18/2024  Clinical Pharmacist follow up: 01/08/2025  VAF/Application Expiration: Yes    Date: 07/05/2025  Type of Encounter: Leeann Bob PharmD    Verbal consent to manage patient's drug therapy was obtained from the patient . They were informed they may decline to participate or withdraw from participation in pharmacy services at any time.    Continue all meds under the continuation of care with the referring provider and clinical pharmacy team.

## 2024-09-18 NOTE — ASSESSMENT & PLAN NOTE
Patient age, weight and renal function appropriate for Eliquis 5mg twice daily, however per provider patient needs lower dose due to kidney function (Scr 1.08 7/9/24).

## 2024-09-19 ENCOUNTER — LAB (OUTPATIENT)
Dept: LAB | Facility: LAB | Age: 84
End: 2024-09-19
Payer: MEDICARE

## 2024-09-19 ENCOUNTER — TELEPHONE (OUTPATIENT)
Dept: INFUSION THERAPY | Facility: CLINIC | Age: 84
End: 2024-09-19
Payer: MEDICARE

## 2024-09-19 DIAGNOSIS — M81.0 OSTEOPOROSIS, UNSPECIFIED OSTEOPOROSIS TYPE, UNSPECIFIED PATHOLOGICAL FRACTURE PRESENCE: ICD-10-CM

## 2024-09-19 LAB
ALBUMIN SERPL BCP-MCNC: 3.9 G/DL (ref 3.4–5)
ALP SERPL-CCNC: 70 U/L (ref 33–136)
ALT SERPL W P-5'-P-CCNC: 13 U/L (ref 7–45)
ANION GAP SERPL CALC-SCNC: 11 MMOL/L (ref 10–20)
AST SERPL W P-5'-P-CCNC: 14 U/L (ref 9–39)
BILIRUB SERPL-MCNC: 0.3 MG/DL (ref 0–1.2)
BUN SERPL-MCNC: 18 MG/DL (ref 6–23)
CA-I BLD-SCNC: 1.2 MMOL/L (ref 1.1–1.33)
CALCIUM SERPL-MCNC: 9.2 MG/DL (ref 8.6–10.6)
CHLORIDE SERPL-SCNC: 110 MMOL/L (ref 98–107)
CO2 SERPL-SCNC: 26 MMOL/L (ref 21–32)
CREAT SERPL-MCNC: 1 MG/DL (ref 0.5–1.05)
EGFRCR SERPLBLD CKD-EPI 2021: 55 ML/MIN/1.73M*2
GLUCOSE SERPL-MCNC: 104 MG/DL (ref 74–99)
POTASSIUM SERPL-SCNC: 4.3 MMOL/L (ref 3.5–5.3)
PROT SERPL-MCNC: 6.3 G/DL (ref 6.4–8.2)
SODIUM SERPL-SCNC: 143 MMOL/L (ref 136–145)

## 2024-09-19 PROCEDURE — 82330 ASSAY OF CALCIUM: CPT

## 2024-09-19 PROCEDURE — 36415 COLL VENOUS BLD VENIPUNCTURE: CPT

## 2024-09-19 PROCEDURE — 80053 COMPREHEN METABOLIC PANEL: CPT

## 2024-09-19 NOTE — TELEPHONE ENCOUNTER
Lab Reminder Call    Called and left VM message for pt. Reminder to have labs drawn and resulted before Prolia appt on 9/26/24.  If unable to have her labs before then, will need to reschedule her appt. Given call back number.       [facility])  [address], [place] [address]  Phone: [phone]

## 2024-09-24 ENCOUNTER — APPOINTMENT (OUTPATIENT)
Dept: NEUROLOGY | Facility: CLINIC | Age: 84
End: 2024-09-24
Payer: MEDICARE

## 2024-09-24 ENCOUNTER — LAB (OUTPATIENT)
Dept: LAB | Facility: LAB | Age: 84
End: 2024-09-24
Payer: MEDICARE

## 2024-09-24 VITALS
SYSTOLIC BLOOD PRESSURE: 156 MMHG | DIASTOLIC BLOOD PRESSURE: 71 MMHG | BODY MASS INDEX: 39.06 KG/M2 | HEART RATE: 73 BPM | HEIGHT: 60 IN

## 2024-09-24 DIAGNOSIS — R51.9 UNILATERAL HEADACHE: Primary | ICD-10-CM

## 2024-09-24 DIAGNOSIS — R29.6 RECURRENT FALLS: ICD-10-CM

## 2024-09-24 DIAGNOSIS — R51.9 UNILATERAL HEADACHE: ICD-10-CM

## 2024-09-24 DIAGNOSIS — M62.81 MUSCLE WEAKNESS: ICD-10-CM

## 2024-09-24 LAB
CK SERPL-CCNC: 47 U/L (ref 0–215)
CRP SERPL-MCNC: 0.18 MG/DL
ERYTHROCYTE [SEDIMENTATION RATE] IN BLOOD BY WESTERGREN METHOD: 48 MM/H (ref 0–30)

## 2024-09-24 PROCEDURE — 36415 COLL VENOUS BLD VENIPUNCTURE: CPT

## 2024-09-24 PROCEDURE — 1160F RVW MEDS BY RX/DR IN RCRD: CPT | Performed by: PSYCHIATRY & NEUROLOGY

## 2024-09-24 PROCEDURE — 3078F DIAST BP <80 MM HG: CPT | Performed by: PSYCHIATRY & NEUROLOGY

## 2024-09-24 PROCEDURE — 86140 C-REACTIVE PROTEIN: CPT

## 2024-09-24 PROCEDURE — 3077F SYST BP >= 140 MM HG: CPT | Performed by: PSYCHIATRY & NEUROLOGY

## 2024-09-24 PROCEDURE — 85652 RBC SED RATE AUTOMATED: CPT

## 2024-09-24 PROCEDURE — 82550 ASSAY OF CK (CPK): CPT

## 2024-09-24 PROCEDURE — 1159F MED LIST DOCD IN RCRD: CPT | Performed by: PSYCHIATRY & NEUROLOGY

## 2024-09-24 PROCEDURE — 99214 OFFICE O/P EST MOD 30 MIN: CPT | Performed by: PSYCHIATRY & NEUROLOGY

## 2024-09-24 NOTE — PROGRESS NOTES
Subjective     Marcelle Hewitt is a 83 y.o. year old female seen in follow-up for headache, RLS; also complains of worsened mobility with left hip flexor weakness.    HPI    83-year-old right-handed woman with past medical history significant for hypertension, diabetes, dyslipidemia, coronary disease, pacemaker placement in 2008 for syncope, L1 compression deformity, cataract surgeries.     I have followed her since 2009. She presented initially with back and lower extremity pain including RLS responsive to ropinirole. She complained in early 2014 of neck and shoulder girdle pain and new headaches. Inflammatory markers were elevated. Temporal artery biopsy was negative. She took prednisone for a diagnosis of polymyalgia rheumatica and ultimately came off prednisone in mid 2016. She was found in November 2016 on carotid ultrasound to have 50-69% bilateral ICA stenosis, but a subsequent carotid ultrasound in November 2017 found less than 50% bilateral ICA stenosis.      I evaluated her by audiovisual visit on 2/1/2021. At that time she reported headaches and I sent her for labs which showed normal ESR and CRP.     I evaluated her again on 6/10/2021, in the office. She reported resolution of headaches at that time. She continued to be bothered by low back and bilateral lower extremity pain including a possible component of RLS. I suggested turmeric 450 mg at bedtime.     I evaluated him again on 2/15/2022, in the office. She indicated an exacerbation of headaches for 2-3 weeks fairly continuously around November/December 2021, and around the time did test positive for COVID-19 with low-grade fevers and some somnolence as the manifestation. Her headaches subsequently resolved and had not returned at the point she saw me last. He did indicate ongoing low back and bilateral lower extremity pain.     I evaluated her again on 10/11/2022 in the office.  She appeared neurologically stable at that visit with no significant  "recurrence of headaches.  She was seeing pain management regarding chronic low back pain.  She was not significantly bothered by RLS symptoms.     I evaluated her most recently on 10/10/2023. She was doing well from the standpoint of headaches and from an RLS standpoint. The visit was mainly focused on a complaint of numbness in the feet.     I saw her shortly thereafter for an EMG which showed evidence of a moderate chronic axonal polyneuropathy, a superimposed chronic right lumbosacral radiculopathy and a mild right median neuropathy at the wrist. I sent her for neuropathy labs which were unrevealing, and recommended night splinting the right wrist.     She is evaluated again today in the office.     She indicates a worsened headache pattern.  This started with a right mandibular root canal in late August.  She developed a severe right frontoparietal vertex pressure that felt like a few fingers pushing vigorously on the involved region.  This persisted at severe intensity for about a week, following which the intensity diminished but the headache has persisted 24/7.  Currently intensity is 4/10.  She endorses some waxing and waning photophobia/phonophobia but no nausea or vomiting.    She discussed with her dentist and it was suspected that the anesthetic used for the root canal might have caused her headache, but as above it has persisted.    She indicates \"something is not right\" with her glasses.  She sometimes sees better without the glasses on although uses them intermittently including today.  She denies diplopia.    She endorses mild tenderness of the temples anteriorly.  She denies neck pain.  She denies jaw claudication symptoms.    She feels her mobility is getting worse.  She specifically has an impression of left hip flexor weakness, such as when she is trying to step up into her large SUV.  She also has difficulty lifting the left leg over the wall of her bathtub to take a shower.  She indicates a few " falls over the course of the past 7 weeks and is using a cane today; she has a walker at home which she uses occasionally.  She attributes the falls to losing her balance, but denies associated dizziness or loss of consciousness.  Her left hip was painful for a period of time but that has resolved.    She is splinting the right wrist at night for mild carpal tunnel syndrome, without complete resolution of symptoms.    Her RLS has been a minor issue lately.  The feet are not significantly keeping her up at night.        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 (Multi)    Chronic diastolic congestive heart failure (Multi)    Cardiac arrhythmia    Left carotid bruit    Hypertension    Hypermetropia of left eye    Hyperlipidemia    Hyperglycemia    Hip pain    GERD (gastroesophageal reflux disease)    Lumbar radiculopathy    Numbness in feet    Myopia of both eyes with regular astigmatism    Obstructive sleep apnea    Osteoporosis    PCO (posterior capsular opacification), left    Presence of cardiac pacemaker    Pulmonary hypertension (Multi)    Restless legs syndrome    RPE (retinal pigment epithelium) hypertrophy    Posterior vitreous detachment    PVD (posterior vitreous detachment), both eyes    Sinoatrial node dysfunction (Multi)    Systolic murmur    Third degree AV block (Multi)    Vitamin D deficiency    Non-insulin dependent type 2 diabetes mellitus (Multi)    Type 2 diabetes mellitus with other circulatory  complications (Multi)    S/P placement of cardiac pacemaker    Vaginal atrophy    Arthritis of both knees    Lumbar epidural mass (Multi)    Obesity, morbid (Multi)    Asthma (HHS-HCC)    Dyspnea on exertion    Localized, primary osteoarthritis    Paroxysmal atrial fibrillation (Multi)    Lumbar stenosis with neurogenic claudication    Temporal arteritis (Multi)    Polymyalgia rheumatica (Multi)    BMI 38.0-38.9,adult    Class 2 severe obesity with serious comorbidity and body mass index (BMI) of 37.0 to 37.9 in adult (Multi)    Arteriosclerosis of coronary artery    Astigmatism    Carpal tunnel syndrome of right wrist    Cellulitis    Constipation    Closed fracture of distal end of fibula    Contact with and (suspected) exposure to covid-19    Depressed mood    Edema    Depressive disorder    Excessive sweating    Osteoarthritis of left knee    Meibomian gland dysfunction    Iritis    Fall    Polyneuropathy    Sprain of knee    Spasm    Pseudophakia    Stenosis of carotid artery    Primary osteoarthritis of both knees    Stage 3a chronic kidney disease (Multi)    Palmar fascial fibromatosis     Past Medical History:   Diagnosis Date    Abnormal finding of blood chemistry, unspecified 07/08/2022    Abnormal blood chemistry    Anesthesia of skin 11/12/2020    Numbness of foot    Bilateral primary osteoarthritis of hip 03/01/2021    Osteoarthritis, hip, bilateral    Dermatochalasis of unspecified eye, unspecified eyelid 05/06/2019    Dermatochalasis    Dry eye syndrome of bilateral lacrimal glands 03/10/2022    Dry eyes    Dry eye syndrome of unspecified lacrimal gland 04/29/2016    Dry eye syndrome    Encounter for prophylactic measures, unspecified 11/12/2020    Need for prophylactic measure    Encounter for screening mammogram for malignant neoplasm of breast     Visit for screening mammogram    Headache, unspecified 07/02/2021    Morning headache    History of falling 07/12/2017    History of fall     Keratoconjunctivitis sicca, not specified as Sjogren's, bilateral 03/10/2022    Keratoconjunctivitis sicca of both eyes not due to Sjogren's syndrome    Meibomian gland dysfunction left eye, upper and lower eyelids 03/10/2022    Meibomian gland dysfunction (MGD) of upper and lower eyelid of left eye    Meibomian gland dysfunction right eye, upper and lower eyelids 03/10/2022    Meibomian gland dysfunction (MGD) of upper and lower eyelid of right eye    Menopausal and female climacteric states 04/29/2016    Postmenopausal disorder    Morbid (severe) obesity due to excess calories (Multi) 07/08/2022    Class 2 severe obesity with serious comorbidity in adult    Myopia, bilateral 03/10/2022    Myopia with presbyopia of both eyes    Myopia, right eye 03/10/2022    Myopia of right eye    Nonexudative age-related macular degeneration, bilateral, early dry stage 05/06/2019    Early dry stage nonexudative age-related macular degeneration of both eyes    Obesity, unspecified 12/02/2020    Obesity (BMI 30-39.9)    Obstructive sleep apnea (adult) (pediatric) 09/21/2017    ALEXANDER on CPAP    Other abnormalities of breathing 12/20/2021    Difficulty breathing    Other conditions influencing health status     DEXA Body Composition Study    Other conditions influencing health status     History of pregnancy    Other fracture of upper and lower end of left fibula, subsequent encounter for closed fracture with routine healing 06/04/2019    Closed fracture of distal end of left fibula with routine healing, unspecified fracture morphology, subsequent encounter    Other headache syndrome 07/13/2016    Other headache syndrome    Other secondary cataract, unspecified eye     PCO (posterior capsular opacification)    Other specified abnormal findings of blood chemistry 04/08/2020    Elevated serum creatinine    Other symptoms and signs involving the musculoskeletal system 09/08/2021    Weakness of both lower extremities    Other vitreous  opacities, unspecified eye     Floaters    Pain in leg, unspecified 11/13/2019    Leg pain    Pain in unspecified limb 09/14/2017    Limb pain    Personal history of diseases of the skin and subcutaneous tissue 04/11/2019    History of cellulitis    Personal history of other diseases of the musculoskeletal system and connective tissue 07/25/2017    History of neck pain    Personal history of other diseases of the musculoskeletal system and connective tissue 07/12/2017    History of muscle spasm    Personal history of other diseases of the musculoskeletal system and connective tissue 06/27/2019    History of osteoporosis    Personal history of other diseases of the musculoskeletal system and connective tissue 03/02/2021    History of spinal stenosis    Personal history of other diseases of the nervous system and sense organs 03/10/2022    History of blepharitis    Personal history of other diseases of the nervous system and sense organs 11/16/2017    History of sciatica    Personal history of other diseases of the nervous system and sense organs 04/17/2019    History of iritis    Personal history of other drug therapy 10/06/2021    History of influenza vaccination    Personal history of other medical treatment 02/22/2021    History of screening mammography    Personal history of other specified conditions 10/06/2021    History of excessive sweating    Personal history of other specified conditions 02/15/2022    History of headache    Personal history of other specified conditions 12/08/2020    History of shortness of breath    Personal history of other specified conditions 12/19/2017    History of urinary frequency    Personal history of other specified conditions 04/03/2018    History of edema    Personal history of other specified conditions 07/08/2022    History of edema    Personal history of other specified conditions 04/12/2019    History of chronic pain    Personal history of urinary (tract) infections  07/15/2021    History of urinary tract infection    Polyosteoarthritis, unspecified 11/12/2020    Osteoarthritis of multiple joints    Presence of intraocular lens     Presence of artificial intra-ocular lens    Presence of intraocular lens 03/10/2022    Pseudophakia of both eyes    Presence of spectacles and contact lenses     Wears eyeglasses    Shortness of breath 06/30/2022    SOB (shortness of breath) on exertion    Spinal stenosis, lumbar region without neurogenic claudication 02/15/2022    Lumbar canal stenosis    Unilateral primary osteoarthritis, left knee 05/07/2020    Primary osteoarthritis of left knee    Unspecified disorder of refraction 03/10/2022    Refractive error    Unspecified epiphora, unspecified side     Eye tearing    Urinary tract infection, site not specified 12/19/2017    Recurrent UTI     Past Surgical History:   Procedure Laterality Date    CARDIAC PACEMAKER PLACEMENT  09/17/2021    Pacemaker Permanent Placement    CATARACT EXTRACTION  11/15/2013    Cataract Surgery    OTHER SURGICAL HISTORY  06/04/2019    Tonsillectomy     Social History     Tobacco Use    Smoking status: Never    Smokeless tobacco: Never   Substance Use Topics    Alcohol use: Never     family history includes Breast cancer in her sister; Diabetes in her sister; Heart disease in her father and mother; Hypertension in her sister; Multiple sclerosis in her sister; Sleep apnea in her father and mother; Strabismus in her child.    Current Outpatient Medications:     apixaban (Eliquis) 2.5 mg tablet, Take 1 tablet (2.5 mg) by mouth 2 times a day., Disp: 180 tablet, Rfl: 3    aspirin 81 mg EC tablet, Take 1 tablet (81 mg) by mouth once daily., Disp: , Rfl:     calcium phosphate-vitamin D3 250 mg-12.5 mcg (500 unit) tablet,chewable, TAKE BY MOUTH AS DIRECTED, Disp: , Rfl:     cholecalciferol (Vitamin D-3) 50 mcg (2,000 unit) capsule, Take 1 capsule (50 mcg) by mouth once daily., Disp: , Rfl:     denosumab (Prolia) 60 mg/mL  syringe, 60 mg subq every 6 months, Disp: 1 mL, Rfl: 3    diclofenac sodium (Voltaren) 1 % gel gel, , Disp: , Rfl:     empagliflozin (Jardiance) 25 mg, Take 1 tablet (25 mg) by mouth once daily., Disp: 90 tablet, Rfl: 3    esomeprazole (NexIUM) 20 mg DR capsule, Take 1 capsule (20 mg) by mouth once daily., Disp: , Rfl:     flecainide (Tambocor) 50 mg tablet, Take 1 tablet (50 mg) by mouth 2 times a day., Disp: 180 tablet, Rfl: 3    furosemide (Lasix) 40 mg tablet, Take 1 tablet (40 mg) by mouth once daily. PRN swelling, Disp: , Rfl:     losartan (Cozaar) 100 mg tablet, Take 1 tablet (100 mg) by mouth once daily., Disp: 90 tablet, Rfl: 3    lubricating eye drops ophthalmic solution, 1 drop if needed for dry eyes., Disp: , Rfl:     metoprolol tartrate (Lopressor) 25 mg tablet, Take 1 tablet (25 mg) by mouth 2 times a day., Disp: 180 tablet, Rfl: 3    rosuvastatin (Crestor) 40 mg tablet, Take 1 tablet (40 mg) by mouth once daily., Disp: 90 tablet, Rfl: 3  Allergies   Allergen Reactions    Macrobid [Nitrofurantoin Monohyd/M-Cryst] Nausea/vomiting    Niacin Unknown    Pregabalin Unknown    Sulfa (Sulfonamide Antibiotics) Unknown       Objective   Neurological Exam  Physical Exam    Physical Examination:    General: Alert woman who was ambulatory with a cane.    Cardiovascular: Carotid auscultation revealed no bruits.  There was mild bilateral anterior temporal tenderness to palpation.    Mental Status: Clear sensorium without fluctuation.  Appropriate in conversation.  Fluent unremarkable speech without paraphasic errors or hesitancy.    Cranial Nerves:  Funduscopic exam was not well visualized bilaterally on nondilated exam.  Pupils were equal, round and reactive to light with no relative afferent pupillary defect.  Extraocular movements were intact and conjugate without nystagmus.  No ptosis.  Visual fields were full to confrontation tested binocularly.  Facial sensation was asymmetric to pin over V1, diminished right,  and symmetric over V2.  Facial motor function was symmetrically intact.  Hearing was grossly intact.  No dysarthria.  Shoulder shrug was symmetric.  Tongue protrusion was midline.    Motor: Muscle tone was normal throughout.  There was no pronator drift.  Finger taps were moderately slower on the left.  Confrontation strength was as follows: Middle deltoid 4-4+, biceps 5, triceps 4+, right FDI less than 4, left ulnar intrinsics variably 4-4+, hip flexion 5 right and 4 left, quadriceps 5, hamstrings 5, ankle dorsiflexion 5.    Coordination: No postural or rest tremor, myoclonus or dystonic posturing.    Tendon reflexes: Symmetrically absent at biceps, brachioradialis, triceps, patellar and ankles.    Gait: Stable with cane and unremarkable.    Assessment/Plan     Given the persistence of her right hemicranial headache since a dental procedure roughly 1 month ago I recommended a CT.  I am also checking ESR and CRP.  The office will contact her with results.    The headache at present is persistent but not severe and does not warrant additional medication beyond OTC analgesics if necessary.    With regard to her left hip flexor weakness it most likely relates to lumbar stenosis with associated radiculopathy, but I will check serum CK.    She indicated that she has an order for physical therapy from her PCP and I advised that she start this for lower extremity strengthening and gait stability.    I advised her to follow-up in 9 months.

## 2024-09-24 NOTE — PATIENT INSTRUCTIONS
Given that you have noted a persistent right hemicranial headache for about the past month, I recommend a head CT and blood tests.    I am also checking a blood test related to your left hip weakness.    The office will call with results.    I recommend starting physical therapy to work on strengthening and balance.    Please see me in 9 months.

## 2024-09-25 ENCOUNTER — TELEPHONE (OUTPATIENT)
Dept: PRIMARY CARE | Facility: CLINIC | Age: 84
End: 2024-09-25
Payer: MEDICARE

## 2024-09-25 NOTE — TELEPHONE ENCOUNTER
Result Communication    Resulted Orders   Comprehensive metabolic panel   Result Value Ref Range    Glucose 104 (H) 74 - 99 mg/dL    Sodium 143 136 - 145 mmol/L    Potassium 4.3 3.5 - 5.3 mmol/L    Chloride 110 (H) 98 - 107 mmol/L    Bicarbonate 26 21 - 32 mmol/L    Anion Gap 11 10 - 20 mmol/L    Urea Nitrogen 18 6 - 23 mg/dL    Creatinine 1.00 0.50 - 1.05 mg/dL    eGFR 55 (L) >60 mL/min/1.73m*2      Comment:      Calculations of estimated GFR are performed using the 2021 CKD-EPI Study Refit equation without the race variable for the IDMS-Traceable creatinine methods.  https://jasn.asnjournals.org/content/early/2021/09/22/ASN.9760234487    Calcium 9.2 8.6 - 10.6 mg/dL    Albumin 3.9 3.4 - 5.0 g/dL    Alkaline Phosphatase 70 33 - 136 U/L    Total Protein 6.3 (L) 6.4 - 8.2 g/dL    AST 14 9 - 39 U/L    Bilirubin, Total 0.3 0.0 - 1.2 mg/dL    ALT 13 7 - 45 U/L      Comment:      Patients treated with Sulfasalazine may generate falsely decreased results for ALT.   Calcium, ionized   Result Value Ref Range    POCT Calcium, Ionized 1.20 1.1 - 1.33 mmol/L      Comment:      The performance characteristics of ionized calcium tested  in heparinized plasma or serum have been validated by the  individual  laboratory site where testing is performed.   Testing on heparinized plasma or serum is not approved by   the FDA; however, such approval is not necessary.       10:27 AM      Results were successfully communicated with the patient and they acknowledged their understanding.

## 2024-09-25 NOTE — TELEPHONE ENCOUNTER
----- Message from Christopher D'Amico sent at 9/21/2024  2:36 PM EDT -----  Chronic kidney disease stable mild, recently normal for age.

## 2024-09-26 ENCOUNTER — APPOINTMENT (OUTPATIENT)
Dept: INFUSION THERAPY | Facility: CLINIC | Age: 84
End: 2024-09-26
Payer: MEDICARE

## 2024-09-26 VITALS
RESPIRATION RATE: 16 BRPM | TEMPERATURE: 97 F | OXYGEN SATURATION: 97 % | HEART RATE: 79 BPM | DIASTOLIC BLOOD PRESSURE: 63 MMHG | SYSTOLIC BLOOD PRESSURE: 126 MMHG

## 2024-09-26 DIAGNOSIS — M81.0 OSTEOPOROSIS, UNSPECIFIED OSTEOPOROSIS TYPE, UNSPECIFIED PATHOLOGICAL FRACTURE PRESENCE: Primary | ICD-10-CM

## 2024-09-26 PROCEDURE — 96372 THER/PROPH/DIAG INJ SC/IM: CPT | Performed by: NURSE PRACTITIONER

## 2024-09-26 RX ORDER — DIPHENHYDRAMINE HYDROCHLORIDE 50 MG/ML
50 INJECTION INTRAMUSCULAR; INTRAVENOUS AS NEEDED
OUTPATIENT
Start: 2025-03-16

## 2024-09-26 RX ORDER — ALBUTEROL SULFATE 0.83 MG/ML
3 SOLUTION RESPIRATORY (INHALATION) AS NEEDED
OUTPATIENT
Start: 2025-03-16

## 2024-09-26 RX ORDER — EPINEPHRINE 0.3 MG/.3ML
0.3 INJECTION SUBCUTANEOUS EVERY 5 MIN PRN
OUTPATIENT
Start: 2025-03-16

## 2024-09-26 RX ORDER — FAMOTIDINE 10 MG/ML
20 INJECTION INTRAVENOUS ONCE AS NEEDED
OUTPATIENT
Start: 2025-03-16

## 2024-09-26 ASSESSMENT — ENCOUNTER SYMPTOMS
COUGH: 0
SHORTNESS OF BREATH: 1
LIGHT-HEADEDNESS: 0
NUMBNESS: 0
PALPITATIONS: 0
DIZZINESS: 0
LEG SWELLING: 0
WOUND: 0
EXTREMITY WEAKNESS: 0
WHEEZING: 0

## 2024-09-26 NOTE — PROGRESS NOTES
[facility]   Infusion Clinic Note   Date: 2024   Name: Marcelle Hewitt  : 1940   MRN: [id number]          Reason for Visit: Injections (Every 6 months Prolia 60mg subcutaneous injection)         Today: We administered denosumab.       Visit Type: INJECTION       Ordered By: Christopher D'Amico DO       Accompanied by: Self       Diagnosis: Osteoporosis, unspecified osteoporosis type, unspecified pathological fracture presence        Allergies:   Allergies as of 2024 - Reviewed 2024   Allergen Reaction Noted    Macrobid [nitrofurantoin monohyd/m-cryst] Nausea/vomiting 2024    Niacin Unknown 2023    Pregabalin Unknown 2023    Sulfa (sulfonamide antibiotics) Unknown 2023          Current Medications has a current medication list which includes the following prescription(s): apixaban, aspirin, calcium phosphate-vitamin d3, cholecalciferol, prolia, diclofenac sodium, empagliflozin, esomeprazole, flecainide, furosemide, losartan, lubricating eye drops, metoprolol tartrate, and rosuvastatin.       Vitals:   Vitals:    24 1100   BP: 126/63   Pulse: 79   Resp: 16   Temp: 36.1 °C (97 °F)   TempSrc: Temporal   SpO2: 97%             Infusion Pre-procedure Checklist:   - Allergies reviewed: yes   - Medications reviewed: yes       - Previous reaction to current treatment: no      Assess patient for the concerns below. Document provider notification as appropriate.  - Active or recent infection with/without current antibiotic use: Recently completed antibiotic therapy for a UTI, no c/o  - Recent or planned invasive dental work: no  - Recent or planned surgeries: no  - Recently received or plans to receive vaccinations: no  - Has treatment related toxicities: no  - Is pregnant:  no      Pain: 0   - Is the pain different from normal: n/a   - Is your Doctor aware:  n/a                Fall Risk Screening: Susan Fall Risk  History of Falling,  Immediate or Within 3 Months: Yes (Ju/y/August 2024, x3.  Lost balance.)  Secondary Diagnosis: No  Ambulatory Aid: Crutches/cane/walker (Uses quad cane)  Intravenous Therapy/Heparin Lock: No  Gait/Transferring: Impaired   Mental Status: Oriented to own ability  Davis Fall Risk Score: 60       Review Of Systems:  Review of Systems   Respiratory:  Positive for shortness of breath. Negative for cough and wheezing.         H/O sleep apnea, wear c-pap at hs   Cardiovascular:  Negative for chest pain, leg swelling and palpitations.        H/O pacemaker  Occasional leg swelling, takes diuretic when needed   Skin:  Negative for itching, rash and wound.   Neurological:  Negative for dizziness, extremity weakness, light-headedness and numbness.        Occasional lightheadedness         ROS completed? Yes      Infusion Readiness:  - Assessment Concerns Related to Infusion: No  - Provider notified: n/a      Document Below Only If Indicated:   New Patient Education:    N/A (returning patient for continuation of therapy. Ongoing education provided as needed.)        Treatment Conditions & Drug Specific Questions:    Denosumab  (PROLIA. XGEVA)    (Unless otherwise specified on patient specific therapy plan):     TREATMENT CONDITIONS:  Unless otherwise specified on patient specific therapy plan HOLD and notify provider prior to proceeding with today's injection if patients:  o Calcium is LESS THAN 8.6 mg/dL OR  Ionized Calcium LESS THAN 1.1 mmol/L  o Recent or planned invasive dental procedure (within 4 weeks)    Lab Results   Component Value Date    CALCIUM 9.2 09/19/2024    PHOS 3.8 02/15/2024      Lab Results   Component Value Date    CAION 1.20 09/19/2024     Labs reviewed and patient meets treatment conditions? Yes    DRUG SPECIFIC QUESTIONS:  Is the patient taking calcium and vitamin D? Yes  (Recommended)    Pt Instructed on following risks: (1) hypocalcemia, (2) osteonecrosis of the jaw, (3) atypical femoral fractures, (4)  serious infections, and (5) dermatologic reactions?  No      REMINDER:  PREGNANCY CATEGORY X DRUG. OBTAIN NEGTATIVE PREGNANCY TEST PRIOR TO FIRST INFUSION FOR WOMEN OF CHILDBEARING ABILITY   REMS DRUG    Recommended Vitals/Observation:  Vitals: Obtain vitals prior to injection.  Observation: Patient may leave immediately following injection.        Weight Based Drug Calculations:    WEIGHT BASED DRUGS: NOT APPLICABLE / FLAT DOSE          Initiated By: Ankita Spencer LPN

## 2024-09-26 NOTE — PATIENT INSTRUCTIONS
Today :We administered denosumab. Prolia 60mg subcutaneous injection right upper arm  Blood Calcium within 28 days of next Prolia appointment    For:   1. Osteoporosis, unspecified osteoporosis type, unspecified pathological fracture presence       Your next appointment is due in:  6 months        Please read the  Medication Guide that was given to you and reviewed during todays visit.     (Tell all doctors including dentists that you are taking this medication)     Go to the emergency room or call 911 if:  -You have signs of allergic reaction:   -Rash, hives, itching.   -Swollen, blistered, peeling skin.   -Swelling of face, lips, mouth, tongue or throat.   -Tightness of chest, trouble breathing, swallowing or talking     Call your doctor:  - If injection site gets red, warm, swollen, itchy or leaks fluid or pus.     (Leave band aid on your injection site for at least 2 hours and keep area clean and dry  - If you get sick or have symptoms of infection or are not feeling well for any reason.    (Wash your hands often, stay away from people who are sick)  - If you have side effects from your medication that do not go away or are bothersome.     (Refer to the teaching your nurse gave you for side effects to call your doctor about)    - Common side effects may include:  stuffy nose, headache, feeling tired, muscle aches, upset stomach  - Before receiving any vaccines     - Call the [facility] at   If:  - You get sick, are on antibiotics, have had a recent vaccine, have surgery or dental work and your doctor wants your visit rescheduled.  - You need to cancel and reschedule your visit for any reason. Call at least 2 days before your visit if you need to cancel.   - Your insurance changes before your next visit.    (We will need to get approval from your new insurance. This can take up to two weeks.)     The [facility] is opened Monday thru Friday. We are closed on weekends and  holidays.   Voice mail will take your call if the center is closed. If you leave a message please allow 24 hours for a call back during weekdays. If you leave a message on a weekend/holiday, we will call you back the next business day.

## 2024-10-10 ENCOUNTER — TELEPHONE (OUTPATIENT)
Dept: PRIMARY CARE | Facility: CLINIC | Age: 84
End: 2024-10-10
Payer: MEDICARE

## 2024-10-10 ENCOUNTER — HOSPITAL ENCOUNTER (OUTPATIENT)
Dept: RADIOLOGY | Facility: CLINIC | Age: 84
Discharge: HOME | End: 2024-10-10
Payer: MEDICARE

## 2024-10-10 DIAGNOSIS — R30.0 DYSURIA: Primary | ICD-10-CM

## 2024-10-10 DIAGNOSIS — R51.9 UNILATERAL HEADACHE: ICD-10-CM

## 2024-10-10 PROCEDURE — 70450 CT HEAD/BRAIN W/O DYE: CPT

## 2024-10-14 ENCOUNTER — LAB (OUTPATIENT)
Dept: LAB | Facility: LAB | Age: 84
End: 2024-10-14
Payer: MEDICARE

## 2024-10-14 ENCOUNTER — TELEPHONE (OUTPATIENT)
Dept: NEUROLOGY | Facility: CLINIC | Age: 84
End: 2024-10-14

## 2024-10-14 ENCOUNTER — APPOINTMENT (OUTPATIENT)
Dept: PRIMARY CARE | Facility: CLINIC | Age: 84
End: 2024-10-14
Payer: MEDICARE

## 2024-10-14 DIAGNOSIS — Z23 ENCOUNTER FOR ADMINISTRATION OF VACCINE: Primary | ICD-10-CM

## 2024-10-14 DIAGNOSIS — R30.0 DYSURIA: ICD-10-CM

## 2024-10-14 LAB
APPEARANCE UR: ABNORMAL
BILIRUB UR STRIP.AUTO-MCNC: NEGATIVE MG/DL
COLOR UR: ABNORMAL
GLUCOSE UR STRIP.AUTO-MCNC: ABNORMAL MG/DL
KETONES UR STRIP.AUTO-MCNC: NEGATIVE MG/DL
LEUKOCYTE ESTERASE UR QL STRIP.AUTO: ABNORMAL
NITRITE UR QL STRIP.AUTO: ABNORMAL
PH UR STRIP.AUTO: 5 [PH]
PROT UR STRIP.AUTO-MCNC: ABNORMAL MG/DL
RBC # UR STRIP.AUTO: NEGATIVE /UL
RBC #/AREA URNS AUTO: NORMAL /HPF
SP GR UR STRIP.AUTO: 1.03
UROBILINOGEN UR STRIP.AUTO-MCNC: NORMAL MG/DL
WBC #/AREA URNS AUTO: NORMAL /HPF

## 2024-10-14 PROCEDURE — G0008 ADMIN INFLUENZA VIRUS VAC: HCPCS | Performed by: STUDENT IN AN ORGANIZED HEALTH CARE EDUCATION/TRAINING PROGRAM

## 2024-10-14 PROCEDURE — 81001 URINALYSIS AUTO W/SCOPE: CPT

## 2024-10-14 PROCEDURE — 90662 IIV NO PRSV INCREASED AG IM: CPT | Performed by: STUDENT IN AN ORGANIZED HEALTH CARE EDUCATION/TRAINING PROGRAM

## 2024-10-14 NOTE — TELEPHONE ENCOUNTER
----- Message from Jayce Borden sent at 10/14/2024  9:32 AM EDT -----  Please inform her that I reviewed her head CT and it appears stable compared to her prior CT from 2021.  No concerning findings.

## 2024-10-15 DIAGNOSIS — N30.00 ACUTE CYSTITIS WITHOUT HEMATURIA: Primary | ICD-10-CM

## 2024-10-15 RX ORDER — CEPHALEXIN 500 MG/1
500 CAPSULE ORAL 2 TIMES DAILY
Qty: 14 CAPSULE | Refills: 0 | Status: SHIPPED | OUTPATIENT
Start: 2024-10-15 | End: 2024-10-22

## 2024-10-15 NOTE — RESULT ENCOUNTER NOTE
Urinalysis does show likely UTI.  Culture not performed for some reason.  Given the patient's allergies, will send Keflex to [facility].  Should consider urogynecology follow-up given recurrent infections.

## 2024-10-16 ENCOUNTER — HOSPITAL ENCOUNTER (OUTPATIENT)
Dept: RADIOLOGY | Facility: CLINIC | Age: 84
Discharge: HOME | End: 2024-10-16
Payer: MEDICARE

## 2024-10-16 ENCOUNTER — TELEPHONE (OUTPATIENT)
Dept: PRIMARY CARE | Facility: CLINIC | Age: 84
End: 2024-10-16

## 2024-10-16 ENCOUNTER — APPOINTMENT (OUTPATIENT)
Dept: ORTHOPEDIC SURGERY | Facility: CLINIC | Age: 84
End: 2024-10-16
Payer: MEDICARE

## 2024-10-16 DIAGNOSIS — M25.552 BILATERAL HIP PAIN: ICD-10-CM

## 2024-10-16 DIAGNOSIS — M25.551 BILATERAL HIP PAIN: ICD-10-CM

## 2024-10-16 DIAGNOSIS — M17.0 PRIMARY OSTEOARTHRITIS OF BOTH KNEES: Primary | ICD-10-CM

## 2024-10-16 PROCEDURE — 73502 X-RAY EXAM HIP UNI 2-3 VIEWS: CPT | Mod: LEFT SIDE | Performed by: RADIOLOGY

## 2024-10-16 PROCEDURE — 73502 X-RAY EXAM HIP UNI 2-3 VIEWS: CPT | Mod: RIGHT SIDE | Performed by: RADIOLOGY

## 2024-10-16 PROCEDURE — 73502 X-RAY EXAM HIP UNI 2-3 VIEWS: CPT | Mod: RT

## 2024-10-16 PROCEDURE — 73502 X-RAY EXAM HIP UNI 2-3 VIEWS: CPT | Mod: LT

## 2024-10-16 PROCEDURE — 1036F TOBACCO NON-USER: CPT | Performed by: ORTHOPAEDIC SURGERY

## 2024-10-16 PROCEDURE — 1159F MED LIST DOCD IN RCRD: CPT | Performed by: ORTHOPAEDIC SURGERY

## 2024-10-16 PROCEDURE — 20610 DRAIN/INJ JOINT/BURSA W/O US: CPT | Performed by: ORTHOPAEDIC SURGERY

## 2024-10-16 RX ORDER — TRIAMCINOLONE ACETONIDE 40 MG/ML
1 INJECTION, SUSPENSION INTRA-ARTICULAR; INTRAMUSCULAR
Status: COMPLETED | OUTPATIENT
Start: 2024-10-16 | End: 2024-10-16

## 2024-10-16 RX ORDER — LIDOCAINE HYDROCHLORIDE 10 MG/ML
4 INJECTION, SOLUTION INFILTRATION; PERINEURAL
Status: COMPLETED | OUTPATIENT
Start: 2024-10-16 | End: 2024-10-16

## 2024-10-16 NOTE — PROGRESS NOTES
L Inj/Asp: bilateral knee on 10/16/2024 2:35 PM  Indications: pain and joint swelling  Details: 22 G needle, anterolateral approach  Medications (Right): 1 mL triamcinolone acetonide 40 mg/mL; 4 mL lidocaine 10 mg/mL (1 %)  Medications (Left): 1 mL triamcinolone acetonide 40 mg/mL; 4 mL lidocaine 10 mg/mL (1 %)  Outcome: tolerated well, no immediate complications    Discussion:  I discussed the conservative treatment options for knee osteoarthritis including but not limited to physical therapy, oral NSAIDS, activity and lifestyle modification, and corticosteroid injections. Pt has elected to undergo a cortisone injection today. I have explained the risk and benefits of an injection including the possibility of joint infection, bleeding, damage to cartilage, allergic reaction. Patient verbalized understanding and gave verbal consent wishes to proceed with a intra-articular cortisone injection for their knee.    Procedure:  After discussing the risk and benefits of the procedure, we proceeded with an intra-articular bilateral knee injection.    With the patient's informed verbal consent, the bilateral knees were prepped in standard sterile fashion with Chlorhexidine. The skin was then anesthetized with ethyl chloride spray and cleaned again with Chlorhexidine. The bilateral knees were then apirated/injected with a prefilled 20-gauge syringe of 40 mg Kenalog + 4 ml Lidocaine using the lateral approach without complications.  The patient tolerated this well and felt immediate initial relief of symptoms. A bandaid was applied and the patient ambulated out of the clinic on ther own accord without difficulty. Patient was instructed to avoid physical activity for 24-48 hours to prevent the knees from swelling and may ice the knees as tolerated. Patient should contact the office if any signs of of infection appear: redness, fever, chills, drainage, swelling or warmth to the knees.  Pt understands that the injections can be  repeated no sooner than 3 months.      Procedure, treatment alternatives, risks and benefits explained, specific risks discussed. Consent was given by the patient. Immediately prior to procedure a time out was called to verify the correct patient, procedure, equipment, support staff and site/side marked as required. Patient was prepped and draped in the usual sterile fashion.

## 2024-10-16 NOTE — TELEPHONE ENCOUNTER
----- Message from Christopher D'Amico sent at 10/15/2024 12:44 PM EDT -----  Urinalysis does show likely UTI.  Culture not performed for some reason.  Given the patient's allergies, will send Keflex to [facility].  Should consider urogynecology follow-up given recurrent infections.

## 2024-10-16 NOTE — TELEPHONE ENCOUNTER
Result Communication    Resulted Orders   Urinalysis with Reflex Microscopic   Result Value Ref Range    Color, Urine Light-Yellow Light-Yellow, Yellow, Dark-Yellow    Appearance, Urine Turbid (N) Clear    Specific Gravity, Urine 1.033 1.005 - 1.035    pH, Urine 5.0 5.0, 5.5, 6.0, 6.5, 7.0, 7.5, 8.0    Protein, Urine 10 (TRACE) NEGATIVE, 10 (TRACE), 20 (TRACE) mg/dL    Glucose, Urine OVER (4+) (A) Normal mg/dL    Blood, Urine NEGATIVE NEGATIVE    Ketones, Urine NEGATIVE NEGATIVE mg/dL    Bilirubin, Urine NEGATIVE NEGATIVE    Urobilinogen, Urine Normal Normal mg/dL    Nitrite, Urine 2+ (A) NEGATIVE    Leukocyte Esterase, Urine 500 Patricia/µL (A) NEGATIVE    Narrative    OVER is reported when the result is greater than the clinically reportable range.   Microscopic Only, Urine   Result Value Ref Range    WBC, Urine NONE 1-5, NONE /HPF    RBC, Urine NONE NONE, 1-2, 3-5 /HPF       5:23 PM      Results were successfully communicated with the patient and they acknowledged their understanding.

## 2024-10-17 DIAGNOSIS — N39.0 URINARY TRACT INFECTION WITHOUT HEMATURIA, SITE UNSPECIFIED: Primary | ICD-10-CM

## 2024-10-17 DIAGNOSIS — N39.0 URINARY TRACT INFECTION WITHOUT HEMATURIA, SITE UNSPECIFIED: ICD-10-CM

## 2024-10-17 PROCEDURE — RXMED WILLOW AMBULATORY MEDICATION CHARGE

## 2024-10-17 RX ORDER — AMOXICILLIN AND CLAVULANATE POTASSIUM 875; 125 MG/1; MG/1
875 TABLET, FILM COATED ORAL 2 TIMES DAILY
Qty: 14 TABLET | Refills: 0 | Status: SHIPPED | OUTPATIENT
Start: 2024-10-17 | End: 2024-10-17 | Stop reason: SDUPTHER

## 2024-10-17 RX ORDER — AMOXICILLIN AND CLAVULANATE POTASSIUM 875; 125 MG/1; MG/1
875 TABLET, FILM COATED ORAL 2 TIMES DAILY
Qty: 14 TABLET | Refills: 0 | Status: SHIPPED | OUTPATIENT
Start: 2024-10-17 | End: 2024-10-24

## 2024-10-20 ENCOUNTER — APPOINTMENT (OUTPATIENT)
Dept: RADIOLOGY | Facility: HOSPITAL | Age: 84
End: 2024-10-20
Payer: MEDICARE

## 2024-10-20 ENCOUNTER — HOSPITAL ENCOUNTER (EMERGENCY)
Facility: HOSPITAL | Age: 84
Discharge: HOME | End: 2024-10-20
Attending: EMERGENCY MEDICINE
Payer: MEDICARE

## 2024-10-20 ENCOUNTER — APPOINTMENT (OUTPATIENT)
Dept: CARDIOLOGY | Facility: HOSPITAL | Age: 84
End: 2024-10-20
Payer: MEDICARE

## 2024-10-20 VITALS
HEIGHT: 60 IN | HEART RATE: 71 BPM | BODY MASS INDEX: 39.27 KG/M2 | OXYGEN SATURATION: 97 % | SYSTOLIC BLOOD PRESSURE: 175 MMHG | TEMPERATURE: 97.3 F | DIASTOLIC BLOOD PRESSURE: 62 MMHG | WEIGHT: 200 LBS | RESPIRATION RATE: 15 BRPM
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
## 2024-10-20 DIAGNOSIS — S52.552A OTHER CLOSED EXTRA-ARTICULAR FRACTURE OF DISTAL END OF LEFT RADIUS, INITIAL ENCOUNTER: ICD-10-CM

## 2024-10-20 PROCEDURE — 93005 ELECTROCARDIOGRAM TRACING: CPT

## 2024-10-20 PROCEDURE — 29125 APPL SHORT ARM SPLINT STATIC: CPT | Mod: LT

## 2024-10-20 PROCEDURE — 73090 X-RAY EXAM OF FOREARM: CPT | Mod: LT

## 2024-10-20 PROCEDURE — 73110 X-RAY EXAM OF WRIST: CPT | Mod: LT

## 2024-10-20 PROCEDURE — 72125 CT NECK SPINE W/O DYE: CPT | Performed by: RADIOLOGY

## 2024-10-20 PROCEDURE — 73502 X-RAY EXAM HIP UNI 2-3 VIEWS: CPT | Mod: LT

## 2024-10-20 PROCEDURE — 99285 EMERGENCY DEPT VISIT HI MDM: CPT

## 2024-10-20 PROCEDURE — 70450 CT HEAD/BRAIN W/O DYE: CPT | Performed by: RADIOLOGY

## 2024-10-20 PROCEDURE — 73564 X-RAY EXAM KNEE 4 OR MORE: CPT | Mod: LT

## 2024-10-20 PROCEDURE — 73080 X-RAY EXAM OF ELBOW: CPT | Mod: LT

## 2024-10-20 PROCEDURE — 70450 CT HEAD/BRAIN W/O DYE: CPT

## 2024-10-20 PROCEDURE — 72125 CT NECK SPINE W/O DYE: CPT

## 2024-10-20 PROCEDURE — 2500000004 HC RX 250 GENERAL PHARMACY W/ HCPCS (ALT 636 FOR OP/ED)

## 2024-10-20 RX ORDER — ACETAMINOPHEN 325 MG/1
975 TABLET ORAL ONCE
Status: DISCONTINUED | OUTPATIENT
Start: 2024-10-20 | End: 2024-10-20 | Stop reason: HOSPADM

## 2024-10-20 RX ORDER — LIDOCAINE HYDROCHLORIDE 10 MG/ML
5 INJECTION, SOLUTION INFILTRATION; PERINEURAL ONCE
Status: COMPLETED | OUTPATIENT
Start: 2024-10-20 | End: 2024-10-20

## 2024-10-20 ASSESSMENT — PAIN DESCRIPTION - PAIN TYPE: TYPE: ACUTE PAIN

## 2024-10-20 ASSESSMENT — PAIN - FUNCTIONAL ASSESSMENT: PAIN_FUNCTIONAL_ASSESSMENT: 0-10

## 2024-10-20 ASSESSMENT — PAIN SCALES - GENERAL
PAINLEVEL_OUTOF10: 7
PAINLEVEL_OUTOF10: 9

## 2024-10-20 ASSESSMENT — PAIN DESCRIPTION - ORIENTATION: ORIENTATION: LEFT

## 2024-10-20 ASSESSMENT — COLUMBIA-SUICIDE SEVERITY RATING SCALE - C-SSRS
2. HAVE YOU ACTUALLY HAD ANY THOUGHTS OF KILLING YOURSELF?: NO
6. HAVE YOU EVER DONE ANYTHING, STARTED TO DO ANYTHING, OR PREPARED TO DO ANYTHING TO END YOUR LIFE?: NO
1. IN THE PAST MONTH, HAVE YOU WISHED YOU WERE DEAD OR WISHED YOU COULD GO TO SLEEP AND NOT WAKE UP?: NO

## 2024-10-20 NOTE — ED PROCEDURE NOTE
Procedure  Splint Application    Performed by: Chris Pa MD  Authorized by: Shell Washington DO    Consent:     Consent obtained:  Verbal    Consent given by:  Patient    Risks, benefits, and alternatives were discussed: yes      Risks discussed:  Numbness, pain and swelling    Alternatives discussed:  No treatment and delayed treatment  Pre-procedure details:     Distal neurologic exam:  Normal    Distal perfusion: distal pulses strong and brisk capillary refill    Procedure details:     Location:  Wrist    Wrist location:  L wrist    Strapping: no      Splint type:  Sugar tong    Supplies:  Elastic bandage, fiberglass, sling and cotton padding    Attestation: Splint applied and adjusted personally by me    Post-procedure details:     Distal neurologic exam:  Normal    Distal perfusion: distal pulses strong      Procedure completion:  Tolerated    Post-procedure imaging: reviewed                 Chris Pa MD  Resident  10/20/24 3068

## 2024-10-20 NOTE — DISCHARGE INSTRUCTIONS
You were seen today for a fall, you appear to have a wrist fracture. We did splint it. I put in for you to to follow up with orthopedics. Please use a cane or walker as needed at home. Additionally, I did put in for orthopedics follow-up, so you should follow up with them. You can take ibuprofen and tylenol at home as you tolerate it for your pain. Please return here if you develop any numbness weakness or tingling in your arm, changes in your vision, or anything else you find concerning.

## 2024-10-20 NOTE — ED TRIAGE NOTES
Pt to ed with c/o fall. Pt states she was at [employer], lost her balance and fell hitting head on a chair. Endorses left side pain. + thinners. -loc

## 2024-10-20 NOTE — ED PROVIDER NOTES
History of Present Illness     History provided by: Patient, EMS, and Significant Other  Limitations to History: None  External Records Reviewed with Brief Summary: None    HPI:  Marcelle Hewitt is a 84 y.o. female previous history of A-fib status post ablation with pacemaker hypertension hyperlipidemia who presents today for follow-up.  Patient states that she was walking in [employer] and when her  let go of her to let her grab onto a chair she then fell forward falling to her left side.  She states that she is now having wrist pain, believes she hit her head on a chair, however, did not lose consciousness.  She denies any numbness weakness or tingling, but does endorse left wrist pain as well as left hip and knee pain.  She states that she usually has some difficulty walking and requires some assistance, was not feeling dizzy prior to this.  She denies any nausea or vomiting.  Denies any changes in her vision or hearing, difficulty swallowing or speaking.    Physical Exam   Triage vitals:  T 36.3 °C (97.3 °F)  HR 77  /82  RR 16  O2 97 %      Physical Exam  Vitals and nursing note reviewed.   Constitutional:       General: She is not in acute distress.     Appearance: Normal appearance. She is not ill-appearing or diaphoretic.   HENT:      Head: Normocephalic and atraumatic.      Mouth/Throat:      Mouth: Mucous membranes are moist.      Pharynx: Oropharynx is clear. No oropharyngeal exudate or posterior oropharyngeal erythema.   Eyes:      General: No scleral icterus.     Extraocular Movements: Extraocular movements intact.      Pupils: Pupils are equal, round, and reactive to light.   Cardiovascular:      Rate and Rhythm: Normal rate and regular rhythm.      Pulses: Normal pulses.      Heart sounds: Normal heart sounds. No murmur heard.     No gallop.   Pulmonary:      Effort: Pulmonary effort is normal. No respiratory distress.      Breath sounds: Normal breath sounds. No stridor. No wheezing,  rhonchi or rales.   Abdominal:      General: Bowel sounds are normal. There is no distension.      Palpations: Abdomen is soft. There is no mass.      Tenderness: There is no abdominal tenderness.      Hernia: No hernia is present.   Musculoskeletal:         General: Normal range of motion.      Cervical back: Normal range of motion and neck supple. No rigidity or tenderness.      Comments: Ecchymosis with tenderness to palpation over distal radius, no tenderness to palpation of the snuffbox.  Otherwise neurovascularly intact in the median radial and ulnar nerve distribution.   Skin:     General: Skin is warm.      Capillary Refill: Capillary refill takes less than 2 seconds.      Findings: No erythema, lesion or rash.   Neurological:      General: No focal deficit present.      Mental Status: She is alert and oriented to person, place, and time. Mental status is at baseline.      Cranial Nerves: No cranial nerve deficit.      Motor: No weakness.      Gait: Gait normal.   Psychiatric:         Mood and Affect: Mood normal.         Behavior: Behavior normal.          Medical Decision Making & ED Course   Medical Decision Makin y.o. female past medical history of hypertension hyperlipidemia A-fib status post ablation with pacer on anticoagulation presents today for mechanical fall.  Given patient's age and blood thinner use, we will get imaging of her head and her neck, though my concern for intracranial hemorrhage or neck injury is low.  We will also get x-rays of the left side of her body, particularly her left wrist as I do believe the patient likely has a left wrist fracture.  She does appear to have a distal radius fracture that is somewhat impacted but nondisplaced.  I did discuss this with the on-call orthopedics resident, who recommended some gentle manipulation, but felt that she likely just needed splinting and outpatient follow-up.  Given this, I did perform some gentle bedside maneuvers and did place  her in a sugar-tong splint.  I did get Po splinting imaging, which demonstrated no real significant change in distal fragment positioning.  Given this, we will discharge patient with hand follow-up in the next 1 to 2 weeks.  All questions were answered prior to discharge, return precautions provided.  ----      Differential diagnoses considered include but are not limited to: Fall, wrist fracture, Galeazzi fracture, scaphoid fracture     Social Determinants of Health which Significantly Impact Care: None identified     EKG Independent Interpretation:  EKG demonstrates an atrially paced rhythm with a normal rate, no significant ST elevations T wave inversions or ST depressions.    Independent Result Review and Interpretation: Relevant laboratory and radiographic results were reviewed and independently interpreted by myself.  As necessary, they are commented on in the ED Course.    Chronic conditions affecting the patient's care: As documented above in [facility]    The patient was discussed with the following consultants/services: None    Care Considerations: As documented above in [facility]    ED Course:  ED Course as of 10/20/24 1451   Sun Oct 20, 2024   1213 EKG reviewed and interpreted independently by me showing atrial paced rhythm at 70 bpm, left bundle branch block wide QRS no ischemic changes [LP]   1229 Patient is presenting to the emergency department after a fall.  Patient was at [employer], walking from her car inside when she began feeling short of breath.  She went to go sit down when she reached over the chair that she was reaching for slipped out from under her and she fell landing on her left side.  She did strike her head, denies loss of consciousness.  Is on blood thinners, Eliquis.  Does have a history of pacemaker placement, was seen by her cardiologist in July and there is no significant change made to her medication regimen.  Patient was able to be brought up into a chair and was able to ambulate afterwards  without assistance.  Does report left-sided hip pain, left wrist and hand pain as well as a headache.  On exam is awake, alert and oriented, GCS is 15, slight abrasion to her left forehead and tenderness to palpation over her left wrist, left thumb, no scaphoid tenderness, no CT or L-spine tenderness to palpation.  Will obtain imaging provide pain control and reeval [LP]      ED Course User Index  [LP] Shell Washington DO         Diagnoses as of 10/20/24 1451   Fall, initial encounter   Other closed extra-articular fracture of distal end of left radius, initial encounter     Disposition   As a result of the work-up, the patient was discharged home.  she was informed of her diagnosis and instructed to come back with any concerns or worsening of condition.  she and was agreeable to the plan as discussed above.  she was given the opportunity to ask questions.  All of the patient's questions were answered.    Procedures   Procedures    Patient seen and discussed with ED attending physician.    Chris Pa MD  Emergency Medicine       Chris Pa MD  Resident  10/20/24 3210

## 2024-10-22 ENCOUNTER — APPOINTMENT (OUTPATIENT)
Dept: ORTHOPEDIC SURGERY | Facility: CLINIC | Age: 84
End: 2024-10-22
Payer: MEDICARE

## 2024-10-22 ENCOUNTER — OFFICE VISIT (OUTPATIENT)
Dept: OBSTETRICS AND GYNECOLOGY | Facility: CLINIC | Age: 84
End: 2024-10-22
Payer: MEDICARE

## 2024-10-22 ENCOUNTER — PHARMACY VISIT (OUTPATIENT)
Dept: PHARMACY | Facility: CLINIC | Age: 84
End: 2024-10-22
Payer: MEDICARE

## 2024-10-22 DIAGNOSIS — R35.0 INCREASED URINARY FREQUENCY: ICD-10-CM

## 2024-10-23 ENCOUNTER — APPOINTMENT (OUTPATIENT)
Dept: SLEEP MEDICINE | Facility: CLINIC | Age: 84
End: 2024-10-23
Payer: MEDICARE

## 2024-10-23 VITALS
HEART RATE: 71 BPM | SYSTOLIC BLOOD PRESSURE: 137 MMHG | OXYGEN SATURATION: 96 % | WEIGHT: 193 LBS | DIASTOLIC BLOOD PRESSURE: 69 MMHG | BODY MASS INDEX: 37.69 KG/M2

## 2024-10-23 DIAGNOSIS — G47.33 OSA (OBSTRUCTIVE SLEEP APNEA): ICD-10-CM

## 2024-10-23 DIAGNOSIS — I10 HYPERTENSION, UNSPECIFIED TYPE: ICD-10-CM

## 2024-10-23 DIAGNOSIS — G47.33 OBSTRUCTIVE SLEEP APNEA: Primary | ICD-10-CM

## 2024-10-23 PROCEDURE — 1036F TOBACCO NON-USER: CPT | Performed by: STUDENT IN AN ORGANIZED HEALTH CARE EDUCATION/TRAINING PROGRAM

## 2024-10-23 PROCEDURE — 3075F SYST BP GE 130 - 139MM HG: CPT | Performed by: STUDENT IN AN ORGANIZED HEALTH CARE EDUCATION/TRAINING PROGRAM

## 2024-10-23 PROCEDURE — 1159F MED LIST DOCD IN RCRD: CPT | Performed by: STUDENT IN AN ORGANIZED HEALTH CARE EDUCATION/TRAINING PROGRAM

## 2024-10-23 PROCEDURE — 3078F DIAST BP <80 MM HG: CPT | Performed by: STUDENT IN AN ORGANIZED HEALTH CARE EDUCATION/TRAINING PROGRAM

## 2024-10-23 PROCEDURE — 99214 OFFICE O/P EST MOD 30 MIN: CPT | Performed by: STUDENT IN AN ORGANIZED HEALTH CARE EDUCATION/TRAINING PROGRAM

## 2024-10-23 PROCEDURE — 1160F RVW MEDS BY RX/DR IN RCRD: CPT | Performed by: STUDENT IN AN ORGANIZED HEALTH CARE EDUCATION/TRAINING PROGRAM

## 2024-10-23 PROCEDURE — G2211 COMPLEX E/M VISIT ADD ON: HCPCS | Performed by: STUDENT IN AN ORGANIZED HEALTH CARE EDUCATION/TRAINING PROGRAM

## 2024-10-23 ASSESSMENT — ENCOUNTER SYMPTOMS
RESPIRATORY NEGATIVE: 1
CONSTITUTIONAL NEGATIVE: 1
NEUROLOGICAL NEGATIVE: 1
CARDIOVASCULAR NEGATIVE: 1
PSYCHIATRIC NEGATIVE: 1

## 2024-10-23 NOTE — ASSESSMENT & PLAN NOTE
BMI Readings from Last 1 Encounters:   10/23/24 37.69 kg/m²     - encouraged healthy weight loss via diet and exercise  - consider referral to the weight loss program at follow-up visit

## 2024-10-23 NOTE — PROGRESS NOTES
Patient: Marcelle Hewitt    [id number]  : 1940 -- AGE 84 y.o.    Provider: Asim Shipman MD     Location [facility]   Service Date: 10/23/2024              [facility]  Followup Visit Note    ASSESSMENT/PLAN     Ms. Hewitt is a 84 y.o. female and she returns in followup to the [facility] for the problems listed below on 10/23/24     Problem List, Orders, Assessment, Recommendations:  Problem List Items Addressed This Visit             ICD-10-CM    Hypertension I10     BP Readings from Last 1 Encounters:   10/23/24 137/69     - doing well, asymptomatic  - discussed at length the impact of untreated ALEXANDER and BP control  - continue current management and follow-up with PCP          Obstructive sleep apnea - Primary G47.33     - doing well with PAP therapy  - continue current setting  - renew PAP supply orders  [place] [facility] [place] -> and patient also reported issues with CPAP shuts down by itself about 2 times a month in the middle of the night  - with further clarification, patient reported that [facility] new CPAP but she didn't know what it was for and sent it back.  [facility] reported to me that it was a replacement machine to repair the upload syncing issue.  [facility] is contacted again and will send out another replacement           BMI 37.0-37.9, adult Z68.37     BMI Readings from Last 1 Encounters:   10/23/24 37.69 kg/m²     - encouraged healthy weight loss via diet and exercise  - consider referral to the weight loss program at follow-up visit            Other Visit Diagnoses         Codes    ALEXANDER (obstructive sleep apnea)     G47.33    Relevant Orders    Positive Airway Pressure (PAP) Therapy            Disposition    Return to clinic in 12 months, virtual visit is fine         HISTORY OF PRESENT ILLNESS     HISTORY OF PRESENT ILLNESS   Marcelle Hewitt is a 84 y.o. female with h/o Hypertension, ALEXANDER,  and Obesity who presents to a [facility] for followup.     Assessment and plan from last visit: 10/25/2023    Ms. Hewitt is a 83 y.o. female and she returns in followup to the [facility] for ALEXANDER.     Problem List, Orders, Assessment, Recommendations:  Problem List Items Addressed This Visit               ICD-10-CM     Hypertension I10           BP Readings from Last 1 Encounters:   10/25/23 150/62   - doing well, asymptomatic  - discussed at length the impact of untreated ALEXANDER and BP control  - continue current management and follow-up with PCP            Obstructive sleep apnea G47.33       - doing well with PAP therapy  - continue current setting  - renew PAP supply orders  - [facility] [place] issue              BMI 38.0-38.9,adult Z68.38           BMI Readings from Last 1 Encounters:   10/25/23 38.08 kg/m²   - encouraged healthy weight loss via diet and exercise  - consider referral to the weight loss program at follow-up visit               Other Visit Diagnoses           Codes     ALEXANDER (obstructive sleep apnea)    -  Primary G47.33                Disposition     Return to clinic in 12 months    Current History    On today's visit, the patient reports that she has been doing well.  Recently broke her arm due to a fall, but hanging in there.  She has been using her CPAP religiously and noticed that about 1-2 times a month, the machine would shut off itself in the middle of the night and when she restarts it, then it's fine again.  She has no issues with [employer] company.  However, when the DME company ([facility] [place] tried to send her a replacement machine to deal with the cloud airview syncing issue, she didn't know what that was for so she returned that machine back to them.  So, we have no download to review, and machine data syncing is still an issue    PAP Info  DURABLE MEDICAL EQUIPMENT COMPANY: [employer]  Issues with therapy:  ISSUES WITH THERAPY: None  Benefits with PAP: PERCEIVED BENEFITS OF PAP: refreshing sleep    PAP Adherence  Not applicable    RLS Followup:   none.    Daytime Symptoms    Patient reports DAYTIME SYMPTOMS: no daytime symptoms  Patient denies daytime symptoms including: Denies: excessive daytime sleepiness    Naps: No  Fatigue: symptoms bothersome, but easily able to carry out all usual work/school/family activities    REVIEW OF SYSTEMS     REVIEW OF SYSTEMS  Review of Systems   Constitutional: Negative.    HENT: Negative.     Respiratory: Negative.     Cardiovascular: Negative.    Genitourinary: Negative.    Skin: Negative.    Neurological: Negative.    Psychiatric/Behavioral: Negative.           ALLERGIES AND MEDICATIONS     ALLERGIES  Allergies   Allergen Reactions   • Macrobid [Nitrofurantoin Monohyd/M-Cryst] Nausea/vomiting   • Niacin Unknown   • Pregabalin Unknown   • Sulfa (Sulfonamide Antibiotics) Unknown       MEDICATIONS: She has a current medication list which includes the following prescription(s): amoxicillin-pot clavulanate - Take 1 tablet (875 mg) by mouth 2 times a day for 7 days, apixaban - Take 1 tablet (2.5 mg) by mouth 2 times a day, aspirin - Take 1 tablet (81 mg) by mouth once daily, calcium phosphate-vitamin d3 - TAKE BY MOUTH AS DIRECTED, cholecalciferol - Take 1 capsule (50 mcg) by mouth once daily, prolia - 60 mg subq every 6 months, diclofenac sodium, empagliflozin - Take 1 tablet (25 mg) by mouth once daily, esomeprazole - Take 1 capsule (20 mg) by mouth once daily, flecainide - Take 1 tablet (50 mg) by mouth 2 times a day, furosemide - Take 1 tablet (40 mg) by mouth once daily. PRN swelling, losartan - Take 1 tablet (100 mg) by mouth once daily, lubricating eye drops - 1 drop if needed for dry eyes, metoprolol tartrate - Take 1 tablet (25 mg) by mouth 2 times a day, and rosuvastatin - Take 1 tablet (40 mg) by mouth once daily.    PAST MEDICAL HISTORY : She  has a past medical history of  Abnormal finding of blood chemistry, unspecified (07/08/2022), Anesthesia of skin (11/12/2020), Bilateral primary osteoarthritis of hip (03/01/2021), Dermatochalasis of unspecified eye, unspecified eyelid (05/06/2019), Dry eye syndrome of bilateral lacrimal glands (03/10/2022), Dry eye syndrome of unspecified lacrimal gland (04/29/2016), Encounter for prophylactic measures, unspecified (11/12/2020), Encounter for screening mammogram for malignant neoplasm of breast, Headache, unspecified (07/02/2021), History of falling (07/12/2017), Keratoconjunctivitis sicca, not specified as Sjogren's, bilateral (03/10/2022), Meibomian gland dysfunction left eye, upper and lower eyelids (03/10/2022), Meibomian gland dysfunction right eye, upper and lower eyelids (03/10/2022), Menopausal and female climacteric states (04/29/2016), Morbid (severe) obesity due to excess calories (Multi) (07/08/2022), Myopia, bilateral (03/10/2022), Myopia, right eye (03/10/2022), Nonexudative age-related macular degeneration, bilateral, early dry stage (05/06/2019), Obesity, unspecified (12/02/2020), Obstructive sleep apnea (adult) (pediatric) (09/21/2017), Other abnormalities of breathing (12/20/2021), Other conditions influencing health status, Other conditions influencing health status, Other fracture of upper and lower end of left fibula, subsequent encounter for closed fracture with routine healing (06/04/2019), Other headache syndrome (07/13/2016), Other secondary cataract, unspecified eye, Other specified abnormal findings of blood chemistry (04/08/2020), Other symptoms and signs involving the musculoskeletal system (09/08/2021), Other vitreous opacities, unspecified eye, Pain in leg, unspecified (11/13/2019), Pain in unspecified limb (09/14/2017), Personal history of diseases of the skin and subcutaneous tissue (04/11/2019), Personal history of other diseases of the musculoskeletal system and connective tissue (07/25/2017), Personal history  of other diseases of the musculoskeletal system and connective tissue (07/12/2017), Personal history of other diseases of the musculoskeletal system and connective tissue (06/27/2019), Personal history of other diseases of the musculoskeletal system and connective tissue (03/02/2021), Personal history of other diseases of the nervous system and sense organs (03/10/2022), Personal history of other diseases of the nervous system and sense organs (11/16/2017), Personal history of other diseases of the nervous system and sense organs (04/17/2019), Personal history of other drug therapy (10/06/2021), Personal history of other medical treatment (02/22/2021), Personal history of other specified conditions (10/06/2021), Personal history of other specified conditions (02/15/2022), Personal history of other specified conditions (12/08/2020), Personal history of other specified conditions (12/19/2017), Personal history of other specified conditions (04/03/2018), Personal history of other specified conditions (07/08/2022), Personal history of other specified conditions (04/12/2019), Personal history of urinary (tract) infections (07/15/2021), Polyosteoarthritis, unspecified (11/12/2020), Presence of intraocular lens, Presence of intraocular lens (03/10/2022), Presence of spectacles and contact lenses, Shortness of breath (06/30/2022), Spinal stenosis, lumbar region without neurogenic claudication (02/15/2022), Unilateral primary osteoarthritis, left knee (05/07/2020), Unspecified disorder of refraction (03/10/2022), Unspecified epiphora, unspecified side, and Urinary tract infection, site not specified (12/19/2017).    PAST SURGICAL HISTORY: She  has a past surgical history that includes Other surgical history (06/04/2019); Cardiac pacemaker placement (09/17/2021); and Cataract extraction (11/15/2013).     FAMILY HISTORY: No changes since previous visit. Otherwise non-contributory as charted.     SOCIAL HISTORY  She  reports  that she has never smoked. She has never used smokeless tobacco. She reports that she does not drink alcohol and does not use drugs.       PHYSICAL EXAM     VITAL SIGNS: /69   Pulse 71   Wt 87.5 kg (193 lb)   SpO2 96%   BMI 37.69 kg/m²      PREVIOUS WEIGHTS:  Wt Readings from Last 3 Encounters:   10/23/24 87.5 kg (193 lb)   10/20/24 90.7 kg (200 lb)   07/17/24 90.7 kg (200 lb)         RESULTS/DATA     Bicarbonate (mmol/L)   Date Value   09/19/2024 26   07/09/2024 24   02/15/2024 25

## 2024-10-23 NOTE — ASSESSMENT & PLAN NOTE
- doing well with PAP therapy  - continue current setting  - renew PAP supply orders  [place] [facility] [place] -> and patient also reported issues with CPAP shuts down by itself about 2 times a month in the middle of the night  - with further clarification, [place] [facility] [place] new CP[place] but she didn't know what it was for and sent it back.  [facility] [place] me that it was a replacement machine to repair the upload syncing issue.  [facility] [place] and will send out another replacement

## 2024-10-23 NOTE — ASSESSMENT & PLAN NOTE
BP Readings from Last 1 Encounters:   10/23/24 137/69     - doing well, asymptomatic  - discussed at length the impact of untreated ALEXANDER and BP control  - continue current management and follow-up with PCP

## 2024-10-26 LAB
ATRIAL RATE: 70 BPM
P AXIS: 86 DEGREES
P OFFSET: 178 MS
P ONSET: 153 MS
PR INTERVAL: 164 MS
Q ONSET: 214 MS
QRS COUNT: 11 BEATS
QRS DURATION: 138 MS
QT INTERVAL: 406 MS
QTC CALCULATION(BAZETT): 438 MS
QTC FREDERICIA: 427 MS
R AXIS: -23 DEGREES
T AXIS: 126 DEGREES
T OFFSET: 417 MS
VENTRICULAR RATE: 70 BPM

## 2024-10-30 ENCOUNTER — OFFICE VISIT (OUTPATIENT)
Dept: ORTHOPEDIC SURGERY | Facility: HOSPITAL | Age: 84
End: 2024-10-30
Payer: MEDICARE

## 2024-10-30 VITALS — BODY MASS INDEX: 37.89 KG/M2 | WEIGHT: 193 LBS | HEIGHT: 60 IN

## 2024-10-30 DIAGNOSIS — M25.532 LEFT WRIST PAIN: Primary | ICD-10-CM

## 2024-10-30 PROCEDURE — 99214 OFFICE O/P EST MOD 30 MIN: CPT | Mod: GC | Performed by: STUDENT IN AN ORGANIZED HEALTH CARE EDUCATION/TRAINING PROGRAM

## 2024-10-30 PROCEDURE — 99214 OFFICE O/P EST MOD 30 MIN: CPT | Performed by: STUDENT IN AN ORGANIZED HEALTH CARE EDUCATION/TRAINING PROGRAM

## 2024-10-30 PROCEDURE — 1159F MED LIST DOCD IN RCRD: CPT | Performed by: STUDENT IN AN ORGANIZED HEALTH CARE EDUCATION/TRAINING PROGRAM

## 2024-10-30 ASSESSMENT — PAIN SCALES - GENERAL: PAINLEVEL_OUTOF10: 5 - MODERATE PAIN

## 2024-10-30 ASSESSMENT — PAIN - FUNCTIONAL ASSESSMENT: PAIN_FUNCTIONAL_ASSESSMENT: 0-10

## 2024-10-30 ASSESSMENT — PAIN DESCRIPTION - DESCRIPTORS: DESCRIPTORS: ACHING;SORE

## 2024-11-06 ENCOUNTER — HOSPITAL ENCOUNTER (OUTPATIENT)
Dept: RADIOLOGY | Facility: HOSPITAL | Age: 84
Discharge: HOME | End: 2024-11-06
Payer: MEDICARE

## 2024-11-06 ENCOUNTER — OFFICE VISIT (OUTPATIENT)
Dept: ORTHOPEDIC SURGERY | Facility: HOSPITAL | Age: 84
End: 2024-11-06
Payer: MEDICARE

## 2024-11-06 VITALS — HEIGHT: 60 IN | WEIGHT: 193 LBS | BODY MASS INDEX: 37.89 KG/M2

## 2024-11-06 DIAGNOSIS — M25.532 LEFT WRIST PAIN: ICD-10-CM

## 2024-11-06 PROCEDURE — 73100 X-RAY EXAM OF WRIST: CPT | Mod: LT

## 2024-11-06 PROCEDURE — 99214 OFFICE O/P EST MOD 30 MIN: CPT | Performed by: STUDENT IN AN ORGANIZED HEALTH CARE EDUCATION/TRAINING PROGRAM

## 2024-11-06 PROCEDURE — 1159F MED LIST DOCD IN RCRD: CPT | Performed by: STUDENT IN AN ORGANIZED HEALTH CARE EDUCATION/TRAINING PROGRAM

## 2024-11-06 PROCEDURE — 99417 PROLNG OP E/M EACH 15 MIN: CPT | Performed by: STUDENT IN AN ORGANIZED HEALTH CARE EDUCATION/TRAINING PROGRAM

## 2024-11-06 PROCEDURE — 1125F AMNT PAIN NOTED PAIN PRSNT: CPT | Performed by: STUDENT IN AN ORGANIZED HEALTH CARE EDUCATION/TRAINING PROGRAM

## 2024-11-06 ASSESSMENT — PAIN DESCRIPTION - DESCRIPTORS: DESCRIPTORS: ACHING;SORE

## 2024-11-06 ASSESSMENT — PAIN - FUNCTIONAL ASSESSMENT: PAIN_FUNCTIONAL_ASSESSMENT: 0-10

## 2024-11-06 ASSESSMENT — PAIN SCALES - GENERAL: PAINLEVEL_OUTOF10: 6

## 2024-11-07 NOTE — PROGRESS NOTES
CHIEF COMPLAINT: L wrist injury  DOI:10/20/24  DOS:none      HISTORY OF PRESENT ILLNESS    This is a very pleasant 84-year-old right-hand-dominant female, retired, uses a cane on a daily basis for balance issues, typically holds her right hand, non-smoker positive diabetes on Jardiance, and Eliquis for A-fib, denies any past surgical history left upper extremity denies any other significant medical history.  She is presenting as a new patient evaluation after sustaining mechanical ground-level fall with a FOOSH type mechanism on the above date.  She was assessed in the ED found to have a distal radius fracture and was placed sugar-tong splint.  She reports to be doing okay.  Pain is improving however she is very uncomfortable in her current splint.  Denies any new numbness tingling or paresthesias.    Interval update 11/6/2024:  Patient returns for scheduled follow-up visit.  She has been compliant with cast care and nonweightbearing status.  She reports she is doing well.  Occasionally she will have a twinge of pain from under the cast when she goes to move her hand.  Denies any significant numbness tingling or paresthesias.  She is happy with the cast although she is unable to admit, we discussed that we will be able to switch that at the next visit assuming her x-rays remain stable.    PHYSICAL EXAM    Extremities / Musculoskeletal:                  L wrist:  Short arm cast on, clean, dry.  Improved digital range of motion.  Persistent edema but improved.  Mild resolving ecchymoses.  Digital motion grossly intact.  EPL FPL intact.  Sensation light touch is intact.  All digits warm well-perfused.      IMAGING / LABS / EMGs:    L wrist XR series from 10/20/24 independently reviewed demonstrating a shortened extra-articular distal radius fracture, ulnar styloid fracture    Updated left wrist x-ray series obtained on 11/6/2024 independently reviewed demonstrate stable appearance of distal radius  fracture.    ASSESSMENT:   Left moderately displaced extra-articular distal radius fracture with associated ulnar styloid fracture    We discussed the diagnosis as well as the available treatment options with her associated risks and benefits.  While her distal radius fracture is displaced, given her age and functional status this is certainly within acceptable limits to trial nonoperative treatment.  The patient agrees with this recommendation.  Transition to a well-padded short arm cast today and will follow this fracture closely for the first 2 weeks to make sure that does not displace further.  I intend on establishing a longitudinal relationship with this patient until her left wrist injury is fully resolved    Interval update 11/6/2024:  Fractures been stable in the past week and a short arm cast.  We will continue to monitor this to make sure that it does not displace we can appropriately continue with nonoperative management.    PLAN:   Maintain short arm cast, we will plan to do a cast change next week assuming x-rays are stable  Nonweightbearing left upper extremity  Encouraged maximal elevation  Encourage aggressive finger range of motion    Follow-up in: 1 week  XR at next visit: Left wrist x-ray series in cast      Cal Ness M.D.    Department of Orthopaedics  Hand/Upper Extremity  Phone: [phone]  Appt. Phone: [phone]

## 2024-11-11 ENCOUNTER — APPOINTMENT (OUTPATIENT)
Dept: UROLOGY | Facility: CLINIC | Age: 84
End: 2024-11-11
Payer: MEDICARE

## 2024-11-12 ENCOUNTER — APPOINTMENT (OUTPATIENT)
Dept: OBSTETRICS AND GYNECOLOGY | Facility: CLINIC | Age: 84
End: 2024-11-12
Payer: MEDICARE

## 2024-11-13 ENCOUNTER — OFFICE VISIT (OUTPATIENT)
Dept: ORTHOPEDIC SURGERY | Facility: HOSPITAL | Age: 84
End: 2024-11-13
Payer: MEDICARE

## 2024-11-13 ENCOUNTER — HOSPITAL ENCOUNTER (OUTPATIENT)
Dept: RADIOLOGY | Facility: HOSPITAL | Age: 84
Discharge: HOME | End: 2024-11-13
Payer: MEDICARE

## 2024-11-13 VITALS — WEIGHT: 193 LBS | BODY MASS INDEX: 37.89 KG/M2 | HEIGHT: 60 IN

## 2024-11-13 DIAGNOSIS — M25.532 LEFT WRIST PAIN: ICD-10-CM

## 2024-11-13 PROCEDURE — 99214 OFFICE O/P EST MOD 30 MIN: CPT | Performed by: STUDENT IN AN ORGANIZED HEALTH CARE EDUCATION/TRAINING PROGRAM

## 2024-11-13 PROCEDURE — 99417 PROLNG OP E/M EACH 15 MIN: CPT | Performed by: STUDENT IN AN ORGANIZED HEALTH CARE EDUCATION/TRAINING PROGRAM

## 2024-11-13 PROCEDURE — 73100 X-RAY EXAM OF WRIST: CPT | Mod: LT

## 2024-11-13 PROCEDURE — 1036F TOBACCO NON-USER: CPT | Performed by: STUDENT IN AN ORGANIZED HEALTH CARE EDUCATION/TRAINING PROGRAM

## 2024-11-13 PROCEDURE — 73100 X-RAY EXAM OF WRIST: CPT | Mod: LEFT SIDE | Performed by: RADIOLOGY

## 2024-11-13 ASSESSMENT — PAIN - FUNCTIONAL ASSESSMENT: PAIN_FUNCTIONAL_ASSESSMENT: 0-10

## 2024-11-13 ASSESSMENT — PAIN SCALES - GENERAL: PAINLEVEL_OUTOF10: 5 - MODERATE PAIN

## 2024-11-13 ASSESSMENT — PAIN DESCRIPTION - DESCRIPTORS: DESCRIPTORS: ACHING;SORE

## 2024-11-13 NOTE — PROGRESS NOTES
CHIEF COMPLAINT: L wrist injury  DOI:10/20/24  DOS:none      HISTORY OF PRESENT ILLNESS    This is a very pleasant 84-year-old right-hand-dominant female, retired, uses a cane on a daily basis for balance issues, typically holds her right hand, non-smoker positive diabetes on Jardiance, and Eliquis for A-fib, denies any past surgical history left upper extremity denies any other significant medical history.  She is presenting as a new patient evaluation after sustaining mechanical ground-level fall with a FOOSH type mechanism on the above date.  She was assessed in the ED found to have a distal radius fracture and was placed sugar-tong splint.  She reports to be doing okay.  Pain is improving however she is very uncomfortable in her current splint.  Denies any new numbness tingling or paresthesias.    Interval update 11/6/2024:  Patient returns for scheduled follow-up visit.  She has been compliant with cast care and nonweightbearing status.  She reports she is doing well.  Occasionally she will have a twinge of pain from under the cast when she goes to move her hand.  Denies any significant numbness tingling or paresthesias.  She is happy with the cast although she is unable to admit, we discussed that we will be able to switch that at the next visit assuming her x-rays remain stable.    Interval update 11/13/2024:  Patient returns for scheduled follow-up visit.  She has been compliant with cast care and nonweightbearing status.  She reports she is doing well.  She is working on her digital range of motion.  Denies any significant pain.  Denies any numbness tingling or paresthesias.    PHYSICAL EXAM    Extremities / Musculoskeletal:                  L wrist:  Short arm cast on, clean, dry.  Improved digital range of motion.  Persistent edema but improved.  Mild resolving ecchymoses.  Digital motion grossly intact.  EPL FPL intact.  Sensation light touch is intact.  All digits warm well-perfused.      IMAGING / LABS  / EMGs:    L wrist XR series from 10/20/24 independently reviewed demonstrating a shortened extra-articular distal radius fracture, ulnar styloid fracture    Updated left wrist x-ray series obtained on 11/6/2024 independently reviewed demonstrate stable appearance of distal radius fracture.    Updated left wrist x-ray series obtained on 11/13/2022 4 independently reviewed demonstrating stable appearance of distal radius fracture.  No change in alignment.  Interval signs of subtle fracture healing.    ASSESSMENT:   Left moderately displaced extra-articular distal radius fracture with associated ulnar styloid fracture    We discussed the diagnosis as well as the available treatment options with her associated risks and benefits.  While her distal radius fracture is displaced, given her age and functional status this is certainly within acceptable limits to trial nonoperative treatment.  The patient agrees with this recommendation.  Transition to a well-padded short arm cast today and will follow this fracture closely for the first 2 weeks to make sure that does not displace further.  I intend on establishing a longitudinal relationship with this patient until her left wrist injury is fully resolved    Interval update 11/6/2024:  Fractures been stable in the past week and a short arm cast.  We will continue to monitor this to make sure that it does not displace we can appropriately continue with nonoperative management.    Interval update 11/13/2024:  Fracture continues to be stable with cast care.  We will do a cast change today to allow a better fitting cast to hopefullygive her the flexibility to return to knitting    PLAN:   Maintain short arm cast,   No significant weightbearing to left upper extremity  Encourage finger range of motion    Follow-up in: 3 weeks  XR at next visit: Left wrist x-ray series out of cast      Cal Ness M.D.    Department of Orthopaedics  Hand/Upper  Extremity  Phone: [phone]  Appt. Phone: [phone]

## 2024-11-14 ENCOUNTER — OFFICE VISIT (OUTPATIENT)
Dept: UROLOGY | Facility: CLINIC | Age: 84
End: 2024-11-14
Payer: MEDICARE

## 2024-11-14 VITALS
BODY MASS INDEX: 37.81 KG/M2 | HEART RATE: 69 BPM | SYSTOLIC BLOOD PRESSURE: 136 MMHG | WEIGHT: 193.6 LBS | DIASTOLIC BLOOD PRESSURE: 68 MMHG

## 2024-11-14 DIAGNOSIS — N81.6 RECTOCELE: ICD-10-CM

## 2024-11-14 DIAGNOSIS — B37.31 YEAST VAGINITIS: ICD-10-CM

## 2024-11-14 DIAGNOSIS — N95.2 VAGINAL ATROPHY: ICD-10-CM

## 2024-11-14 DIAGNOSIS — N39.0 CHRONIC UTI: ICD-10-CM

## 2024-11-14 DIAGNOSIS — N39.0 ACUTE UTI: Primary | ICD-10-CM

## 2024-11-14 DIAGNOSIS — N81.89 PELVIC FLOOR WEAKNESS: ICD-10-CM

## 2024-11-14 LAB
POC APPEARANCE, URINE: ABNORMAL
POC BILIRUBIN, URINE: NEGATIVE
POC BLOOD, URINE: ABNORMAL
POC COLOR, URINE: YELLOW
POC GLUCOSE, URINE: ABNORMAL MG/DL
POC KETONES, URINE: NEGATIVE MG/DL
POC LEUKOCYTES, URINE: ABNORMAL
POC NITRITE,URINE: POSITIVE
POC PH, URINE: 5.5 PH
POC PROTEIN, URINE: NEGATIVE MG/DL
POC SPECIFIC GRAVITY, URINE: 1.02
POC UROBILINOGEN, URINE: 0.2 EU/DL

## 2024-11-14 PROCEDURE — 1160F RVW MEDS BY RX/DR IN RCRD: CPT | Performed by: OBSTETRICS & GYNECOLOGY

## 2024-11-14 PROCEDURE — 81003 URINALYSIS AUTO W/O SCOPE: CPT | Mod: QW | Performed by: OBSTETRICS & GYNECOLOGY

## 2024-11-14 PROCEDURE — 1159F MED LIST DOCD IN RCRD: CPT | Performed by: OBSTETRICS & GYNECOLOGY

## 2024-11-14 PROCEDURE — 3078F DIAST BP <80 MM HG: CPT | Performed by: OBSTETRICS & GYNECOLOGY

## 2024-11-14 PROCEDURE — 87086 URINE CULTURE/COLONY COUNT: CPT | Performed by: OBSTETRICS & GYNECOLOGY

## 2024-11-14 PROCEDURE — 1036F TOBACCO NON-USER: CPT | Performed by: OBSTETRICS & GYNECOLOGY

## 2024-11-14 PROCEDURE — 3075F SYST BP GE 130 - 139MM HG: CPT | Performed by: OBSTETRICS & GYNECOLOGY

## 2024-11-14 PROCEDURE — 99214 OFFICE O/P EST MOD 30 MIN: CPT | Performed by: OBSTETRICS & GYNECOLOGY

## 2024-11-14 PROCEDURE — 99204 OFFICE O/P NEW MOD 45 MIN: CPT | Performed by: OBSTETRICS & GYNECOLOGY

## 2024-11-14 RX ORDER — CIPROFLOXACIN 500 MG/1
500 TABLET ORAL 2 TIMES DAILY
Qty: 14 TABLET | Refills: 0 | Status: SHIPPED | OUTPATIENT
Start: 2024-11-14 | End: 2024-11-21

## 2024-11-14 RX ORDER — FLUCONAZOLE 150 MG/1
TABLET ORAL
Qty: 2 TABLET | Refills: 0 | Status: SHIPPED | OUTPATIENT
Start: 2024-11-14

## 2024-11-14 RX ORDER — ESTRADIOL 0.1 MG/G
CREAM VAGINAL
Qty: 42.5 G | Refills: 3 | Status: SHIPPED | OUTPATIENT
Start: 2024-11-14

## 2024-11-14 NOTE — PATIENT INSTRUCTIONS
Please start your twice daily ciprofloxacin antibiotic therapy to treat your presumed UTI.     You will be contacted with the results of your urine culture to discuss your therapy and possible antibiotic prophylaxis moving forward.    Please start your fluconazole pill now and repeat in 3 days.       Please consider starting cranberry extract tablets and d-mannose therapy. This can be picked up over-the-counter, online, or at your local health food store.     You have been provided a standing order for urinalysis and urine culture. Should you have any UTI-like symptoms, please present immediately to any [facility] lab to drop off a urine sample.     Please start your vaginal estrogen therapy nightly for 2 weeks and then 3 times a week thereafter. Do not use the plastic applicator and only use your fingertip.     Call the clinic with questions or concerns.    [phone]

## 2024-11-14 NOTE — PROGRESS NOTES
Subjective   Patient ID: Marcelle Hewitt is a 84 y.o. female who presents for No chief complaint on file..  HPI  84 y.o.  patient presenting as a referral from    with complaints of burning, urethritis,  urgency, frequency and dysuria and yeast vaginitis concerns.       The patient presents with complaints of recurrent UTIs for the past 2 months. Her cultures came back positive for yeats infection treated with Amoxicillin.   Her symptoms include urgency, frequency, burning, dysuria. She has a soothing sensation after she wipes with a tissue. She reports that her symptoms did not improve with antibiotics . Her UA is grossly infected today, culture and sensitivities awaited.  Reviewing her urine cultures not positive for true UTI .    She is on Jardiance.      Outside her UTIs, for the past 2 months she notes 3-4 episodes of nocturia but denies any enuresis. She voids every 1-2 hour during the day. She denies any urinary urgency and frequency complaints before her UTI episode  2 months ago. She denies any leaking with laughing, coughing and sneezing. She denies any History of nephrolithiasis.     She is sexually active. She does note irritative vaginal complaints,  with itching and irritation. She denies any abnormal vaginal bleeding or discharge. She denies any bulge complaints, no pressure or pulling.      She denies any bowel related complaints, no fecal or flatal incontinence.     She has no other complaints.       Review of Systems  Constitutional: No fever, No chills and No fatigue.   Eyes: No vision problems and No dryness of the eyes.   ENT: No dry mouth, No hearing loss and No nosebleeds.   Cardiovascular: No chest pain, No palpitations and No orthopnea.   Respiratory: No shortness of breath, No cough and No wheezing.   Gastrointestinal: No abdominal pain, No constipation, No nausea, No diarrhea, No vomiting and No melena.   Genitourinary: As noted in HPI.   Musculoskeletal: No back pain, No myalgias,  No muscle weakness, No joint swelling and No leg edema.   Integumentary: No rashes, No skin lesion and No itching.   Neurological: No headache, No numbness and No dizziness.   Psychiatric: No sleep disturbances, No anxiety and No depression.   Endocrine: No hot flashes, No loss of hair and No hirsutism.   Hematologic/Lymphatic: No swollen glands, No tendency for easy bleeding and No tendency for easy bruising.   All other systems have been reviewed and are negative for complaint.        Objective   Physical Exam  PHYSICAL EXAMINATION:  No LMP recorded.  There is no height or weight on file to calculate BMI.  There were no vitals taken for this visit.  General Appearance: well appearing  Neuro: Alert and oriented   HEENT: mucous membranes moist, neck supple  Resp: No respiratory distress, normal work of breathing  MSK: normal range of motion, gait appropriate    Pelvic:  Genitourinary: Erythema to the inguinal folds concerning for yeast vaginitis  Urethra   normal meatus, non-tender, no periurethral mass  Vaginal mucosa  normal  Cervix normal  Uterus normal size, non-tender, mobile  Adnexae  negative nontender, no masses  Atrophy positive    CST negative  Pelvic floor muscle contraction  1/5, no pain throughout exam    POP-Q (in supine position):       Aa -3     Ba -3     C -8              gh 4     pb 2.5     tvl 9              Ap -1     Bp -1     D -8    Rectal: no hemorrhoids, fissures or masses    Assessment/Plan   84-year-old with acute on chronic urinary tract infections, pelvic floor weakness, vaginal atrophy, and recent onset urinary urgency and frequency complaints with yeast vaginitis.    1.  We discussed the patient's acute on chronic urinary tract infections.  The patient will be empirically started on ciprofloxacin now.  Pending urine culture.  We discussed starting prophylactic antibiotics daily thereafter pending these results.  We discussed not using any vaginal irritants or soaps.  We discussed  "starting vaginal estrogen therapy.  We discussed its safety, efficacy, proper utilization.  We discussed starting cranberry extract tablets and d-mannose therapy.  The patient was provided a standing order for urinalysis and urine culture.    #2 we discussed the concerns for a yeast vaginitis and the patient will be started on fluconazole therapy now.    3.  The patient notes \"vaginal pain\".  There was no pain on pelvic exam today and it appears that this is likely related to bladder spasm associated with her urinary tract infection.  We will readdress these complaints following adequate treatment of her infection.    4.  The patient will be contacted with the results of her urine culture to discuss therapy and prophylaxis.    WAN Sainz MD      Scribe Attestation  By signing my name below, I, Angelo Machado attest that this documentation has been prepared under the direction and in the presence of Guevara Sainz MD. All medical record entries made by the Scribe were at my direction or personally dictated by me. I have reviewed the chart and agree that the record accurately reflects my personal performance of the history, physical exam, discussion and plan.    "

## 2024-11-17 LAB — BACTERIA UR CULT: ABNORMAL

## 2024-11-18 ENCOUNTER — TELEPHONE (OUTPATIENT)
Dept: UROLOGY | Facility: CLINIC | Age: 84
End: 2024-11-18
Payer: MEDICARE

## 2024-11-18 DIAGNOSIS — N39.0 CHRONIC UTI: Primary | ICD-10-CM

## 2024-11-18 RX ORDER — TRIMETHOPRIM 100 MG/1
100 TABLET ORAL DAILY
Qty: 30 TABLET | Refills: 2 | Status: SHIPPED | OUTPATIENT
Start: 2024-11-18 | End: 2025-02-16

## 2024-11-18 NOTE — TELEPHONE ENCOUNTER
----- Message from Guevara Sainz sent at 11/18/2024  8:18 AM EST -----  Regarding: Callback  Can you please let her know to start trimethoprim after she completes her ciprofloxacin as a daily low-dose prophylactic antibiotic?    Thanks!

## 2024-11-22 ENCOUNTER — TELEPHONE (OUTPATIENT)
Dept: SLEEP MEDICINE | Facility: HOSPITAL | Age: 84
End: 2024-11-22
Payer: MEDICARE

## 2024-11-22 NOTE — TELEPHONE ENCOUNTER
Pt spouse called to verify pt new PAP machine available on [employer]. It was verified, they will call equipment company to check settings and call back if need more help.

## 2024-11-26 ENCOUNTER — APPOINTMENT (OUTPATIENT)
Dept: CARDIOLOGY | Facility: CLINIC | Age: 84
End: 2024-11-26
Payer: MEDICARE

## 2024-11-26 ENCOUNTER — ANCILLARY PROCEDURE (OUTPATIENT)
Dept: CARDIOLOGY | Facility: CLINIC | Age: 84
End: 2024-11-26
Payer: MEDICARE

## 2024-11-26 VITALS
OXYGEN SATURATION: 96 % | DIASTOLIC BLOOD PRESSURE: 64 MMHG | HEIGHT: 60 IN | WEIGHT: 187 LBS | SYSTOLIC BLOOD PRESSURE: 123 MMHG | BODY MASS INDEX: 36.71 KG/M2 | HEART RATE: 86 BPM

## 2024-11-26 DIAGNOSIS — I44.2 ATRIOVENTRICULAR BLOCK, COMPLETE (MULTI): Primary | ICD-10-CM

## 2024-11-26 DIAGNOSIS — I44.2 ATRIOVENTRICULAR BLOCK, COMPLETE (MULTI): ICD-10-CM

## 2024-11-26 DIAGNOSIS — I48.0 PAROXYSMAL ATRIAL FIBRILLATION (MULTI): ICD-10-CM

## 2024-11-26 PROCEDURE — 93005 ELECTROCARDIOGRAM TRACING: CPT | Performed by: INTERNAL MEDICINE

## 2024-11-26 PROCEDURE — 99214 OFFICE O/P EST MOD 30 MIN: CPT | Performed by: INTERNAL MEDICINE

## 2024-11-26 PROCEDURE — 3078F DIAST BP <80 MM HG: CPT | Performed by: INTERNAL MEDICINE

## 2024-11-26 PROCEDURE — 3074F SYST BP LT 130 MM HG: CPT | Performed by: INTERNAL MEDICINE

## 2024-11-26 PROCEDURE — 1036F TOBACCO NON-USER: CPT | Performed by: INTERNAL MEDICINE

## 2024-11-26 PROCEDURE — 1126F AMNT PAIN NOTED NONE PRSNT: CPT | Performed by: INTERNAL MEDICINE

## 2024-11-26 PROCEDURE — 93280 PM DEVICE PROGR EVAL DUAL: CPT

## 2024-11-26 ASSESSMENT — ENCOUNTER SYMPTOMS
OCCASIONAL FEELINGS OF UNSTEADINESS: 1
DEPRESSION: 0
LOSS OF SENSATION IN FEET: 0

## 2024-11-26 ASSESSMENT — PAIN SCALES - GENERAL: PAINLEVEL_OUTOF10: 0-NO PAIN

## 2024-11-26 NOTE — PROGRESS NOTES
Referred by Dr. Marion ref. provider found provider found for No chief complaint on file.       Marcelle Hewitt is a 84 y.o. year old female patient with h/o  PAF, sinus node dysfunction s/p PPM. Presents for year follow up.     PMHx/PSHx: As above    FamHx: unremarkable     Allergies:  Allergies   Allergen Reactions    Cephalexin Nausea/vomiting    Macrobid [Nitrofurantoin Monohyd/M-Cryst] Nausea/vomiting    Niacin Unknown    Pregabalin Unknown    Sulfa (Sulfonamide Antibiotics) Unknown        Review of Systems    Constitutional: not feeling tired.   Eyes: no eyesight problems.   ENT: no hearing loss and no nosebleeds.   Cardiovascular: no intermittent leg claudication and as noted in HPI.   Respiratory: no chronic cough and no shortness of breath.   Gastrointestinal: no change in bowel habits and no blood in stools.   Genitourinary: no urinary frequency and no hematuria.   Skin: no skin rashes.   Neurological: no seizures and no frequent falls.   Psychiatric: no depression and not suicidal.   All other systems have been reviewed and are negative for complaint.     Outpatient Medications:  Current Outpatient Medications   Medication Instructions    aspirin 81 mg EC tablet 1 tablet, Daily    calcium phosphate-vitamin D3 250 mg-12.5 mcg (500 unit) tablet,chewable TAKE BY MOUTH AS DIRECTED    cholecalciferol (Vitamin D-3) 50 mcg (2,000 unit) capsule 1 capsule, Daily    denosumab (Prolia) 60 mg/mL syringe 60 mg subq every 6 months    diclofenac sodium (Voltaren) 1 % gel gel     Eliquis 2.5 mg, oral, 2 times daily    esomeprazole (NexIUM) 20 mg DR capsule 1 capsule, Daily    estradiol (Estrace) 0.01 % (0.1 mg/gram) vaginal cream Place a dime sized amount vaginally nightly for 2 weeks then 3 times a week thereafter.    flecainide (TAMBOCOR) 50 mg, oral, 2 times daily    fluconazole (Diflucan) 150 mg tablet Take 1 tablet by mouth now and repeat in 3 days.    furosemide (Lasix) 40 mg tablet 1 tablet, Daily    Jardiance 25  mg, oral, Daily    losartan (COZAAR) 100 mg, oral, Daily    lubricating eye drops ophthalmic solution 1 drop, As needed    metoprolol tartrate (LOPRESSOR) 25 mg, oral, 2 times daily    rosuvastatin (CRESTOR) 40 mg, oral, Daily    trimethoprim (TRIMPEX) 100 mg, oral, Daily, Start daily after completing ciprofloxacin.         Last Recorded Vitals:      10/20/2024     4:45 PM 10/20/2024     5:00 PM 10/23/2024    10:07 AM 10/30/2024     1:29 PM 11/6/2024     2:10 PM 11/13/2024     2:38 PM 11/14/2024     2:31 PM   Vitals   Systolic 174 175 137    136   Diastolic 89 62 69    68   Heart Rate 71 71 71    69   Resp 22 15        Height    1.524 m (5') 1.524 m (5') 1.524 m (5')    Weight (lb)   193 193 193 193 193.6   BMI   37.69 kg/m2 37.69 kg/m2 37.69 kg/m2 37.69 kg/m2 37.81 kg/m2   BSA (m2)   1.92 m2 1.92 m2 1.92 m2 1.92 m2 1.93 m2   Visit Report   Report Report Report Report Report    Visit Vitals  Smoking Status Never        Physical Exam:  Constitutional: alert and in no acute distress.   Eyes: no erythema, swelling or discharge from the eye .   Neck: neck is supple, symmetric, trachea midline, no masses  and no thyromegaly .   Pulmonary: no increased work of breathing or signs of respiratory distress  and lungs clear to auscultation.    Cardiovascular: carotid pulses 2+ bilaterally with no bruit , JVP was normal, no thrills , regular rhythm, normal S1 and S2, no murmurs , pedal pulses 2+ bilaterally  and no edema .   Abdomen: abdomen non-tender, no masses  and no hepatomegaly .   Skin: skin warm and dry, normal skin turgor .   Psychiatric judgment and insight is normal  and oriented to person, place and time .        Assessment/Plan   Problem List Items Addressed This Visit    None      Marcelle Hewitt is a 84 y.o. year old female patient with h/o  PAF, sinus node dysfunction s/p PPM. Presents for year follow up.   The patient reports some SOB with activity. Device interrogation showed NSR with normal device  function. The rate response was reprogrammed and will wait to see if this improves her symptoms. Otherwise she remains table. Will continue her current medications and follow up in 1 year.         Sebastian White MD  Cardiac Electrophysiology      Thank you very much for allowing me to participate in the care of this pleasant patient. Please do not hesitate to contact me with any further questions or concerns regarding his care.    **Disclaimer: This note was dictated by speech recognition, and every effort has been made to prevent any error in transcription, however minor errors may be present**

## 2024-11-27 LAB — BODY SURFACE AREA: 1.89 M2

## 2024-12-04 ENCOUNTER — APPOINTMENT (OUTPATIENT)
Dept: ORTHOPEDIC SURGERY | Facility: HOSPITAL | Age: 84
End: 2024-12-04
Payer: MEDICARE

## 2024-12-05 ENCOUNTER — APPOINTMENT (OUTPATIENT)
Dept: OBSTETRICS AND GYNECOLOGY | Facility: CLINIC | Age: 84
End: 2024-12-05
Payer: MEDICARE

## 2024-12-06 ENCOUNTER — APPOINTMENT (OUTPATIENT)
Dept: ORTHOPEDIC SURGERY | Facility: CLINIC | Age: 84
End: 2024-12-06
Payer: MEDICARE

## 2024-12-06 DIAGNOSIS — B37.31 YEAST VAGINITIS: ICD-10-CM

## 2024-12-06 RX ORDER — FLUCONAZOLE 150 MG/1
TABLET ORAL
Qty: 2 TABLET | Refills: 0 | Status: SHIPPED | OUTPATIENT
Start: 2024-12-06

## 2024-12-11 ENCOUNTER — OFFICE VISIT (OUTPATIENT)
Dept: ORTHOPEDIC SURGERY | Facility: HOSPITAL | Age: 84
End: 2024-12-11
Payer: MEDICARE

## 2024-12-11 ENCOUNTER — HOSPITAL ENCOUNTER (OUTPATIENT)
Dept: RADIOLOGY | Facility: HOSPITAL | Age: 84
Discharge: HOME | End: 2024-12-11
Payer: MEDICARE

## 2024-12-11 VITALS — WEIGHT: 187 LBS | HEIGHT: 60 IN | BODY MASS INDEX: 36.71 KG/M2

## 2024-12-11 DIAGNOSIS — M25.532 LEFT WRIST PAIN: ICD-10-CM

## 2024-12-11 DIAGNOSIS — S69.92XS WRIST INJURY, LEFT, SEQUELA: ICD-10-CM

## 2024-12-11 PROCEDURE — 73110 X-RAY EXAM OF WRIST: CPT | Mod: LEFT SIDE | Performed by: RADIOLOGY

## 2024-12-11 PROCEDURE — 1036F TOBACCO NON-USER: CPT | Performed by: STUDENT IN AN ORGANIZED HEALTH CARE EDUCATION/TRAINING PROGRAM

## 2024-12-11 PROCEDURE — 73110 X-RAY EXAM OF WRIST: CPT | Mod: LT

## 2024-12-11 PROCEDURE — 1159F MED LIST DOCD IN RCRD: CPT | Performed by: STUDENT IN AN ORGANIZED HEALTH CARE EDUCATION/TRAINING PROGRAM

## 2024-12-11 PROCEDURE — G2211 COMPLEX E/M VISIT ADD ON: HCPCS | Performed by: STUDENT IN AN ORGANIZED HEALTH CARE EDUCATION/TRAINING PROGRAM

## 2024-12-11 PROCEDURE — 99214 OFFICE O/P EST MOD 30 MIN: CPT | Performed by: STUDENT IN AN ORGANIZED HEALTH CARE EDUCATION/TRAINING PROGRAM

## 2024-12-11 PROCEDURE — L3908 WHO COCK-UP NONMOLDE PRE OTS: HCPCS | Performed by: STUDENT IN AN ORGANIZED HEALTH CARE EDUCATION/TRAINING PROGRAM

## 2024-12-11 NOTE — PROGRESS NOTES
CHIEF COMPLAINT: L wrist injury  DOI:10/20/24  DOS:none      HISTORY OF PRESENT ILLNESS    This is a very pleasant 84-year-old right-hand-dominant female, retired, uses a cane on a daily basis for balance issues, typically holds her right hand, non-smoker positive diabetes on Jardiance, and Eliquis for A-fib, denies any past surgical history left upper extremity denies any other significant medical history.  She is presenting as a new patient evaluation after sustaining mechanical ground-level fall with a FOOSH type mechanism on the above date.  She was assessed in the ED found to have a distal radius fracture and was placed sugar-tong splint.  She reports to be doing okay.  Pain is improving however she is very uncomfortable in her current splint.  Denies any new numbness tingling or paresthesias.    Interval update 11/6/2024:  Patient returns for scheduled follow-up visit.  She has been compliant with cast care and nonweightbearing status.  She reports she is doing well.  Occasionally she will have a twinge of pain from under the cast when she goes to move her hand.  Denies any significant numbness tingling or paresthesias.  She is happy with the cast although she is unable to admit, we discussed that we will be able to switch that at the next visit assuming her x-rays remain stable.    Interval update 11/13/2024:  Patient returns for scheduled follow-up visit.  She has been compliant with cast care and nonweightbearing status.  She reports she is doing well.  She is working on her digital range of motion.  Denies any significant pain.  Denies any numbness tingling or paresthesias.    Interval update 12/11/2024:  Patient return for scheduled follow-up visit.  She has been compliant with cast care and nonweightbearing status.  She reports doing well.  She denies any numbness tingling or paresthesias.  She has been working well on her digital motion.    PHYSICAL EXAM    Extremities / Musculoskeletal:                   L wrist:  Short arm cast removed.  No skin issues no wounds.  No erythema edema or ecchymoses.  Nontender palpation throughout the wrist.  EPL intact FPL intact FDS and FDP to all fingers intact.  Full composite digital flexion.  Pretty good wrist motion already with approximately 50 degrees of wrist extension 50 degrees of wrist flexion.  Full pronation 45 degrees of supination.  Motor intact radial ulnar median AIN PIN.  Sensation tact light touch radial ulnar median.  2+ radial warm well-perfused      IMAGING / LABS / EMGs:    L wrist XR series from 10/20/24 independently reviewed demonstrating a shortened extra-articular distal radius fracture, ulnar styloid fracture    Updated left wrist x-ray series obtained on 11/6/2024 independently reviewed demonstrate stable appearance of distal radius fracture.    Updated left wrist x-ray series obtained on 11/13/2022 4 independently reviewed demonstrating stable appearance of distal radius fracture.  No change in alignment.  Interval signs of subtle fracture healing.    Updated left wrist x-ray series obtained on 12/11/2024 independently reviewed demonstrate stable appearance of distal radius fracture.  Robust interval bony callus formation.    ASSESSMENT:   Left moderately displaced extra-articular distal radius fracture with associated ulnar styloid fracture    We discussed the diagnosis as well as the available treatment options with her associated risks and benefits.  While her distal radius fracture is displaced, given her age and functional status this is certainly within acceptable limits to trial nonoperative treatment.  The patient agrees with this recommendation.  Transition to a well-padded short arm cast today and will follow this fracture closely for the first 2 weeks to make sure that does not displace further.  I intend on establishing a longitudinal relationship with this patient until her left wrist injury is fully resolved    Interval update  11/6/2024:  Fractures been stable in the past week and a short arm cast.  We will continue to monitor this to make sure that it does not displace we can appropriately continue with nonoperative management.    Interval update 11/13/2024:  Fracture continues to be stable with cast care.  We will do a cast change today to allow a better fitting cast to hopefullygive her the flexibility to return to knitting    Interval update 12/11/2024:  Patient doing excellent.  We have completed formal immobilization.  Will now transition to removable cock up wrist brace to be worn while performing high demand activities and while out in public.  Patient elected to perform gentle range of motion exercises at home as opposed to any formal occupational therapy which I think is fine given how good her range of motion is already.    PLAN:   Transition to removable cock up wrist brace today  Out of the brace for light activities  Return to clinic    Follow-up in: 3 months  XR at next visit: Left wrist x-ray series    Cal Ness M.D.    Department of Orthopaedics  Hand/Upper Extremity  Phone: [phone]  Appt. Phone: [phone]

## 2024-12-16 ENCOUNTER — TELEPHONE (OUTPATIENT)
Dept: SLEEP MEDICINE | Facility: HOSPITAL | Age: 84
End: 2024-12-16
Payer: MEDICARE

## 2024-12-16 NOTE — TELEPHONE ENCOUNTER
Pt would like to bring her machine into Dr Shipman's [place] office and have him help turn the auto-off/on settings on.

## 2024-12-17 ENCOUNTER — OFFICE VISIT (OUTPATIENT)
Dept: CARDIOLOGY | Facility: CLINIC | Age: 84
End: 2024-12-17
Payer: MEDICARE

## 2024-12-17 VITALS
BODY MASS INDEX: 38.42 KG/M2 | WEIGHT: 195.7 LBS | OXYGEN SATURATION: 97 % | DIASTOLIC BLOOD PRESSURE: 69 MMHG | HEIGHT: 60 IN | SYSTOLIC BLOOD PRESSURE: 123 MMHG | HEART RATE: 87 BPM

## 2024-12-17 DIAGNOSIS — Z95.0 PRESENCE OF CARDIAC PACEMAKER: Primary | ICD-10-CM

## 2024-12-17 DIAGNOSIS — R94.31 ABNORMAL ELECTROCARDIOGRAM (ECG) (EKG): ICD-10-CM

## 2024-12-17 PROCEDURE — 3078F DIAST BP <80 MM HG: CPT | Performed by: NURSE PRACTITIONER

## 2024-12-17 PROCEDURE — 1036F TOBACCO NON-USER: CPT | Performed by: NURSE PRACTITIONER

## 2024-12-17 PROCEDURE — 1126F AMNT PAIN NOTED NONE PRSNT: CPT | Performed by: NURSE PRACTITIONER

## 2024-12-17 PROCEDURE — G2211 COMPLEX E/M VISIT ADD ON: HCPCS | Performed by: NURSE PRACTITIONER

## 2024-12-17 PROCEDURE — 99214 OFFICE O/P EST MOD 30 MIN: CPT | Performed by: NURSE PRACTITIONER

## 2024-12-17 PROCEDURE — 1160F RVW MEDS BY RX/DR IN RCRD: CPT | Performed by: NURSE PRACTITIONER

## 2024-12-17 PROCEDURE — 1159F MED LIST DOCD IN RCRD: CPT | Performed by: NURSE PRACTITIONER

## 2024-12-17 PROCEDURE — 3074F SYST BP LT 130 MM HG: CPT | Performed by: NURSE PRACTITIONER

## 2024-12-17 ASSESSMENT — ENCOUNTER SYMPTOMS
RESPIRATORY NEGATIVE: 1
CONSTITUTIONAL NEGATIVE: 1
DEPRESSION: 0
OCCASIONAL FEELINGS OF UNSTEADINESS: 1
CARDIOVASCULAR NEGATIVE: 1
LOSS OF SENSATION IN FEET: 0
NEUROLOGICAL NEGATIVE: 1
MUSCULOSKELETAL NEGATIVE: 1
GASTROINTESTINAL NEGATIVE: 1

## 2024-12-17 ASSESSMENT — PAIN SCALES - GENERAL: PAINLEVEL_OUTOF10: 0-NO PAIN

## 2024-12-17 NOTE — PROGRESS NOTES
Chief Complaint:   Follow-up    History Of Present Illness:    .Mrs Hewitt returns in follow up. Denies chest pain, sob, palpitations or pedal edema.  She fell [place] and fractured her left wrist.         Last Recorded Vitals:  Blood pressure 123/69, pulse 87, height 1.524 m (5'), weight 88.8 kg (195 lb 11.2 oz), SpO2 97%.     Past Medical History:  Past Medical History:   Diagnosis Date    Abnormal finding of blood chemistry, unspecified 07/08/2022    Abnormal blood chemistry    Anesthesia of skin 11/12/2020    Numbness of foot    Bilateral primary osteoarthritis of hip 03/01/2021    Osteoarthritis, hip, bilateral    Dermatochalasis of unspecified eye, unspecified eyelid 05/06/2019    Dermatochalasis    Dry eye syndrome of bilateral lacrimal glands 03/10/2022    Dry eyes    Dry eye syndrome of unspecified lacrimal gland 04/29/2016    Dry eye syndrome    Encounter for prophylactic measures, unspecified 11/12/2020    Need for prophylactic measure    Encounter for screening mammogram for malignant neoplasm of breast     Visit for screening mammogram    Headache, unspecified 07/02/2021    Morning headache    History of falling 07/12/2017    History of fall    Keratoconjunctivitis sicca, not specified as Sjogren's, bilateral 03/10/2022    Keratoconjunctivitis sicca of both eyes not due to Sjogren's syndrome    Meibomian gland dysfunction left eye, upper and lower eyelids 03/10/2022    Meibomian gland dysfunction (MGD) of upper and lower eyelid of left eye    Meibomian gland dysfunction right eye, upper and lower eyelids 03/10/2022    Meibomian gland dysfunction (MGD) of upper and lower eyelid of right eye    Menopausal and female climacteric states 04/29/2016    Postmenopausal disorder    Morbid (severe) obesity due to excess calories (Multi) 07/08/2022    Class 2 severe obesity with serious comorbidity in adult    Myopia, bilateral 03/10/2022    Myopia with presbyopia of both eyes    Myopia, right eye 03/10/2022     Myopia of right eye    Nonexudative age-related macular degeneration, bilateral, early dry stage 05/06/2019    Early dry stage nonexudative age-related macular degeneration of both eyes    Obesity, unspecified 12/02/2020    Obesity (BMI 30-39.9)    Obstructive sleep apnea (adult) (pediatric) 09/21/2017    ALEXANDER on CPAP    Other abnormalities of breathing 12/20/2021    Difficulty breathing    Other conditions influencing health status     DEXA Body Composition Study    Other conditions influencing health status     History of pregnancy    Other fracture of upper and lower end of left fibula, subsequent encounter for closed fracture with routine healing 06/04/2019    Closed fracture of distal end of left fibula with routine healing, unspecified fracture morphology, subsequent encounter    Other headache syndrome 07/13/2016    Other headache syndrome    Other secondary cataract, unspecified eye     PCO (posterior capsular opacification)    Other specified abnormal findings of blood chemistry 04/08/2020    Elevated serum creatinine    Other symptoms and signs involving the musculoskeletal system 09/08/2021    Weakness of both lower extremities    Other vitreous opacities, unspecified eye     Floaters    Pain in leg, unspecified 11/13/2019    Leg pain    Pain in unspecified limb 09/14/2017    Limb pain    Personal history of diseases of the skin and subcutaneous tissue 04/11/2019    History of cellulitis    Personal history of other diseases of the musculoskeletal system and connective tissue 07/25/2017    History of neck pain    Personal history of other diseases of the musculoskeletal system and connective tissue 07/12/2017    History of muscle spasm    Personal history of other diseases of the musculoskeletal system and connective tissue 06/27/2019    History of osteoporosis    Personal history of other diseases of the musculoskeletal system and connective tissue 03/02/2021    History of spinal stenosis    Personal  history of other diseases of the nervous system and sense organs 03/10/2022    History of blepharitis    Personal history of other diseases of the nervous system and sense organs 11/16/2017    History of sciatica    Personal history of other diseases of the nervous system and sense organs 04/17/2019    History of iritis    Personal history of other drug therapy 10/06/2021    History of influenza vaccination    Personal history of other medical treatment 02/22/2021    History of screening mammography    Personal history of other specified conditions 10/06/2021    History of excessive sweating    Personal history of other specified conditions 02/15/2022    History of headache    Personal history of other specified conditions 12/08/2020    History of shortness of breath    Personal history of other specified conditions 12/19/2017    History of urinary frequency    Personal history of other specified conditions 04/03/2018    History of edema    Personal history of other specified conditions 07/08/2022    History of edema    Personal history of other specified conditions 04/12/2019    History of chronic pain    Personal history of urinary (tract) infections 07/15/2021    History of urinary tract infection    Polyosteoarthritis, unspecified 11/12/2020    Osteoarthritis of multiple joints    Presence of intraocular lens     Presence of artificial intra-ocular lens    Presence of intraocular lens 03/10/2022    Pseudophakia of both eyes    Presence of spectacles and contact lenses     Wears eyeglasses    Shortness of breath 06/30/2022    SOB (shortness of breath) on exertion    Spinal stenosis, lumbar region without neurogenic claudication 02/15/2022    Lumbar canal stenosis    Unilateral primary osteoarthritis, left knee 05/07/2020    Primary osteoarthritis of left knee    Unspecified disorder of refraction 03/10/2022    Refractive error    Unspecified epiphora, unspecified side     Eye tearing    Urinary tract infection,  site not specified 12/19/2017    Recurrent UTI        Past Surgical History:  Past Surgical History:   Procedure Laterality Date    CARDIAC PACEMAKER PLACEMENT  09/17/2021    Pacemaker Permanent Placement    CATARACT EXTRACTION  11/15/2013    Cataract Surgery    OTHER SURGICAL HISTORY  06/04/2019    Tonsillectomy       Social History:  Social History     Socioeconomic History    Marital status:    Tobacco Use    Smoking status: Never    Smokeless tobacco: Never   Vaping Use    Vaping status: Never Used   Substance and Sexual Activity    Alcohol use: Never    Drug use: Never       Family History:  Family History   Problem Relation Name Age of Onset    Heart disease Mother      Sleep apnea Mother      Heart disease Father      Sleep apnea Father      Breast cancer Sister      Diabetes Sister      Hypertension Sister      Multiple sclerosis Sister      Strabismus Child           Allergies:  Cephalexin, Macrobid [nitrofurantoin monohyd/m-cryst], Niacin, Pregabalin, and Sulfa (sulfonamide antibiotics)    Outpatient Medications:  Current Outpatient Medications   Medication Sig Dispense Refill    apixaban (Eliquis) 2.5 mg tablet Take 1 tablet (2.5 mg) by mouth 2 times a day. 180 tablet 3    aspirin 81 mg EC tablet Take 1 tablet (81 mg) by mouth once daily.      calcium phosphate-vitamin D3 250 mg-12.5 mcg (500 unit) tablet,chewable Chew 1 tablet once daily.      cholecalciferol (Vitamin D-3) 50 mcg (2,000 unit) capsule Take 1 capsule (50 mcg) by mouth once daily.      denosumab (Prolia) 60 mg/mL syringe 60 mg subq every 6 months 1 mL 3    empagliflozin (Jardiance) 25 mg Take 1 tablet (25 mg) by mouth once daily. 90 tablet 3    esomeprazole (NexIUM) 20 mg DR capsule Take 1 capsule (20 mg) by mouth once daily.      estradiol (Estrace) 0.01 % (0.1 mg/gram) vaginal cream Place a dime sized amount vaginally nightly for 2 weeks then 3 times a week thereafter. 42.5 g 3    flecainide (Tambocor) 50 mg tablet Take 1 tablet  (50 mg) by mouth 2 times a day. 180 tablet 3    furosemide (Lasix) 40 mg tablet Take 1 tablet (40 mg) by mouth once daily. PRN swelling      losartan (Cozaar) 100 mg tablet Take 1 tablet (100 mg) by mouth once daily. 90 tablet 3    lubricating eye drops ophthalmic solution 1 drop if needed for dry eyes.      metoprolol tartrate (Lopressor) 25 mg tablet Take 1 tablet (25 mg) by mouth 2 times a day. 180 tablet 3    rosuvastatin (Crestor) 40 mg tablet Take 1 tablet (40 mg) by mouth once daily. 90 tablet 3    trimethoprim (Trimpex) 100 mg tablet Take 1 tablet (100 mg) by mouth once daily. Start daily after completing ciprofloxacin. 30 tablet 2    diclofenac sodium (Voltaren) 1 % gel gel Apply topically 3 times a day as needed. (Patient not taking: Reported on 12/17/2024)      fluconazole (Diflucan) 150 mg tablet Take 1 tablet by mouth now and repeat in 3 days. 2 tablet 0     No current facility-administered medications for this visit.        Physical Exam:  Cardiovascular:      PMI at left midclavicular line. Normal rate. Regular rhythm. Normal S1. Normal S2.       Murmurs: There is no murmur.      No gallop.  No click. No rub.   Pulses:     Intact distal pulses.   Edema:     Peripheral edema absent.         ROS:  Review of Systems   Constitutional: Negative.   Cardiovascular: Negative.    Respiratory: Negative.     Skin: Negative.    Musculoskeletal: Negative.    Gastrointestinal: Negative.    Genitourinary: Negative.    Neurological: Negative.           Last Labs:  CBC -  Lab Results   Component Value Date    WBC 9.8 07/09/2024    HGB 13.1 07/09/2024    HCT 42.0 07/09/2024    MCV 96 07/09/2024     07/09/2024       CMP -  Lab Results   Component Value Date    CALCIUM 9.2 09/19/2024    PHOS 3.8 02/15/2024    PROT 6.3 (L) 09/19/2024    ALBUMIN 3.9 09/19/2024    AST 14 09/19/2024    ALT 13 09/19/2024    ALKPHOS 70 09/19/2024    BILITOT 0.3 09/19/2024       LIPID PANEL -   Lab Results   Component Value Date    CHOL  150 07/09/2024    TRIG 149 07/09/2024    HDL 45.6 07/09/2024    CHHDL 3.3 07/09/2024    LDLF 62 07/19/2023    VLDL 30 07/09/2024    NHDL 104 07/09/2024       RENAL FUNCTION PANEL -   Lab Results   Component Value Date    GLUCOSE 104 (H) 09/19/2024     09/19/2024    K 4.3 09/19/2024     (H) 09/19/2024    CO2 26 09/19/2024    ANIONGAP 11 09/19/2024    BUN 18 09/19/2024    CREATININE 1.00 09/19/2024    CALCIUM 9.2 09/19/2024    PHOS 3.8 02/15/2024    ALBUMIN 3.9 09/19/2024        Lab Results   Component Value Date     (H) 03/17/2023    HGBA1C 6.1 (H) 07/09/2024         Assessment/Plan   Problem List Items Addressed This Visit    None    1. Documented normal coronary arteries by cardiac cath 11/1994 and 10/2001 and 1/2018. The patient again had cardiac cath in [place] 01/2018. Normal coronary arteries. Echocardiogram was performed on 10/05/2016 showing a preserved LV ejection fraction of 55â€“60 percent with mild hypokinesis of the anteroseptal wall presumably due to pacemaker activity. There was however evidence of moderate pulmonary hypertension with moderate 2+ tricuspid valve regurgitation.  Echo 04/2022  CONCLUSIONS:   1. The left ventricular systolic function is normal with a 55-60% estimated ejection fraction.   2. Abnormal septal motion consistent with RV pacemaker.   3. There is mild hypokinesis and dyssynchrony of the anteroseptal wall.   4. The left atrium is mild to moderately dilated.   5. Mildly elevated RVSP.   6. There is mild to moderate tricuspid regurgitation.   7. The estimated PASP is 45 mmHg.   8. There is plaque visualized in the ascending aorta.   2. Positive family history of CAD.  3. History of rate related left bundle branch block conduction delay.  4. Status post dual-chamber permanent pacemaker insertion 08/14/2008 for cardiogenic syncope with pulse generator replacement 08/12/2019. The patient did have a recent device check on 04/06/2020 showing right atrial pacing 87%  right ventricular pacing only 1%. There were no ventricular high rate episodes. There were 3 episodes of either atrial tachycardia or atrial fibrillation all lasting less than 1 minute. The patient did have a more recent pacemaker interrogation on 8/27/2020 done remotely. This demonstrated 100% right atrial pacing with 0% right ventricular pacemaking activity. The estimated battery life is 12 years. There were no episodes of atrial tachycardia or atrial fibrillation on Cardizem  mg daily.  The patient has had a more recent device interrogation 6/7/2023 estimated battery life 9.5 years.  5 episodes of high atrial rates longest lasting greater than 48 hours on 3/13/2023.  Total burden of atrial fibrillation is 2%.  She is atrial pacing 92% ventricular pacing 1%.  The patient is presently on flecainide 50 mg twice daily Eliquis 2.5 mg twice daily and metoprolol 25 mg twice daily.  5. Hypertension. The patient will remain on the same dose of the Cardizem  mg daily and Losartan 50 mg daily. If blood pressure elevates in the future could increase the dose of losartan.  6. Hyperlipidemia. She is presently on rosuvastatin 40 mg daily and her most recent lab work from 04/01/2020 included a cholesterol of 130 LDL 63 HDL 40 triglyceride 132. The CBC and SMA panels were normal with creatinine of 1.11. The patient did have repeat lab work performed on 11/12/2020 with cholesterol 156 LDL 77 triglyceride 100 HDL 58. The liver function panel was normal. Creatinine was 1.22 and glycohemoglobin 6.4%. Vitamin D level was 30. The CBC was normal.  The patient had recent lab work 7/19/2023 with cholesterol 137 LDL 62 HDL 45 triglyceride 148 which is satisfactory.  The glycohemoglobin was 6.1% CBC and electrolyte panel is normal.  7. History of cardiogenic syncope.  8. GERD.  9. Bronchospastic airway disease.  10.Osteoporosis.  11.History of atypical pneumonia.  12.Remote left-sided cataract extraction.  13.Borderline type 2  diabetes. The patient's most recent glycohemoglobin was 6. 5% on 04/01/2020 and also 6.4% when checked on 11/2/2020.  Patient complaining of some numbness in the feet scheduled for EMG.  14. PVD. Patient apparently saw PCP after visit from [place] with bilateral pedal edema and redness. Was switched from losartan-hydrochlorothiazide to furosemide 40 mg daily and given antibiotic. Remain on furosemide 40 mg daily. The patient did have lower extremity ultrasound on 04/03/2018 negative for DVT.  15. Obstructive sleep apnea: On CPAP.   16. Low-grade carotid vascular disease. Carotid US done 11/2017 showed < 50% stenosis bilaterally with possible left subclavian stenosis. The patient is on Plavix 75 mg daily as per neurology.  17. OA. Left knee moderate OA. Follows with Dr Wing. Recent x-rays from 05/07/2020 demonstrated bilateral advanced medial compartmental DJD.  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 The patient is had 2 episodes of stumbling and falling over the past several weeks. On one occasion she tripped over a curb and another time on uneven pavement. A head CT on 11/22/2019 was unremarkable. A CT scan of the lumbosacral spine on 11/20/2019 showed multilevel DJD most prominent at L5-S1. the patient is followed by neurology.  Patient has received epidural injections to the lumbosacral spine for her chronic low back pain.  She has some numbness in her feet and will be scheduled for EMG.  19. Vitamin D deficiency. The patient had a vitamin D level of only 26 on 10/04/2019 as been placed on supplementation.  20. Obstructive sleep apnea. Continue follow-up with pulmonology and the use of a BiPAP mask.    21.  Mild CKD.          Afua Best, APRN-CNP

## 2024-12-18 ENCOUNTER — APPOINTMENT (OUTPATIENT)
Dept: CARDIOLOGY | Facility: CLINIC | Age: 84
End: 2024-12-18
Payer: MEDICARE

## 2024-12-24 ENCOUNTER — HOSPITAL ENCOUNTER (OUTPATIENT)
Dept: CARDIOLOGY | Facility: CLINIC | Age: 84
Discharge: HOME | End: 2024-12-24
Payer: MEDICARE

## 2024-12-24 DIAGNOSIS — I44.2 ATRIOVENTRICULAR BLOCK, COMPLETE (MULTI): ICD-10-CM

## 2024-12-24 DIAGNOSIS — Z95.0 PRESENCE OF CARDIAC PACEMAKER: ICD-10-CM

## 2025-01-08 ENCOUNTER — APPOINTMENT (OUTPATIENT)
Dept: PRIMARY CARE | Facility: CLINIC | Age: 85
End: 2025-01-08
Payer: MEDICARE

## 2025-01-08 ENCOUNTER — TELEPHONE (OUTPATIENT)
Dept: PRIMARY CARE | Facility: CLINIC | Age: 85
End: 2025-01-08

## 2025-01-08 ENCOUNTER — TELEMEDICINE (OUTPATIENT)
Dept: PHARMACY | Facility: HOSPITAL | Age: 85
End: 2025-01-08
Payer: MEDICARE

## 2025-01-08 DIAGNOSIS — I48.91 ATRIAL FIBRILLATION, UNSPECIFIED TYPE (MULTI): Primary | ICD-10-CM

## 2025-01-08 DIAGNOSIS — I50.32 CHRONIC DIASTOLIC CONGESTIVE HEART FAILURE: ICD-10-CM

## 2025-01-08 NOTE — PROGRESS NOTES
Pharmacist Clinic: Cardiology Management    Marcelle Hewitt is a 84 y.o. female was referred to Clinical Pharmacy Team for anticoagulation and heart failure management.     Referring Provider: Afua Best APRN*    THIS IS A FOLLOW UP PATIENT APPOINTMENT. AT LAST VISIT ON 09/18/2024 WITH PHARMACIST (Vivian Bob).    Appointment was completed by Marcelle who was reached at primary number.    REVIEW OF PAST APPNT (IF APPLICABLE):   Patient reports doing well on Eliquis, reports adherence, no adverse effects and denies s/s of bleeding  Patient does report falling 3 times in the past month. She states this was on carpet without injury and denies hitting her head. Marcelle states she felt like she did not feel she needed to go get checked out by a provider. We discussed in depth the importance of going to the ER after falls, especially repeated falls and being on a blood thinner. Patient verbalizes understanding. Patient attributes these falls to her legs being weak, not dizziness.  Patient reports feeling dizzy and lightheadedness about 1-2x/ week. Patient states she thinks this is from dehydration and once she sits down and drinks water, this feeling goes away.   Plan to continue current medication regimen given falls and complaints of lightheaded/dizziness.    Allergies Reviewed? No    Allergies   Allergen Reactions    Cephalexin Nausea/vomiting    Macrobid [Nitrofurantoin Monohyd/M-Cryst] Nausea/vomiting    Niacin Unknown    Pregabalin Unknown    Sulfa (Sulfonamide Antibiotics) Unknown       Past Medical History:   Diagnosis Date    Abnormal finding of blood chemistry, unspecified 07/08/2022    Abnormal blood chemistry    Anesthesia of skin 11/12/2020    Numbness of foot    Bilateral primary osteoarthritis of hip 03/01/2021    Osteoarthritis, hip, bilateral    Dermatochalasis of unspecified eye, unspecified eyelid 05/06/2019    Dermatochalasis    Dry eye syndrome of bilateral lacrimal glands 03/10/2022    Dry  eyes    Dry eye syndrome of unspecified lacrimal gland 04/29/2016    Dry eye syndrome    Encounter for prophylactic measures, unspecified 11/12/2020    Need for prophylactic measure    Encounter for screening mammogram for malignant neoplasm of breast     Visit for screening mammogram    Headache, unspecified 07/02/2021    Morning headache    History of falling 07/12/2017    History of fall    Keratoconjunctivitis sicca, not specified as Sjogren's, bilateral 03/10/2022    Keratoconjunctivitis sicca of both eyes not due to Sjogren's syndrome    Meibomian gland dysfunction left eye, upper and lower eyelids 03/10/2022    Meibomian gland dysfunction (MGD) of upper and lower eyelid of left eye    Meibomian gland dysfunction right eye, upper and lower eyelids 03/10/2022    Meibomian gland dysfunction (MGD) of upper and lower eyelid of right eye    Menopausal and female climacteric states 04/29/2016    Postmenopausal disorder    Morbid (severe) obesity due to excess calories (Multi) 07/08/2022    Class 2 severe obesity with serious comorbidity in adult    Myopia, bilateral 03/10/2022    Myopia with presbyopia of both eyes    Myopia, right eye 03/10/2022    Myopia of right eye    Nonexudative age-related macular degeneration, bilateral, early dry stage 05/06/2019    Early dry stage nonexudative age-related macular degeneration of both eyes    Obesity, unspecified 12/02/2020    Obesity (BMI 30-39.9)    Obstructive sleep apnea (adult) (pediatric) 09/21/2017    ALEXANDER on CPAP    Other abnormalities of breathing 12/20/2021    Difficulty breathing    Other conditions influencing health status     DEXA Body Composition Study    Other conditions influencing health status     History of pregnancy    Other fracture of upper and lower end of left fibula, subsequent encounter for closed fracture with routine healing 06/04/2019    Closed fracture of distal end of left fibula with routine healing, unspecified fracture morphology,  subsequent encounter    Other headache syndrome 07/13/2016    Other headache syndrome    Other secondary cataract, unspecified eye     PCO (posterior capsular opacification)    Other specified abnormal findings of blood chemistry 04/08/2020    Elevated serum creatinine    Other symptoms and signs involving the musculoskeletal system 09/08/2021    Weakness of both lower extremities    Other vitreous opacities, unspecified eye     Floaters    Pain in leg, unspecified 11/13/2019    Leg pain    Pain in unspecified limb 09/14/2017    Limb pain    Personal history of diseases of the skin and subcutaneous tissue 04/11/2019    History of cellulitis    Personal history of other diseases of the musculoskeletal system and connective tissue 07/25/2017    History of neck pain    Personal history of other diseases of the musculoskeletal system and connective tissue 07/12/2017    History of muscle spasm    Personal history of other diseases of the musculoskeletal system and connective tissue 06/27/2019    History of osteoporosis    Personal history of other diseases of the musculoskeletal system and connective tissue 03/02/2021    History of spinal stenosis    Personal history of other diseases of the nervous system and sense organs 03/10/2022    History of blepharitis    Personal history of other diseases of the nervous system and sense organs 11/16/2017    History of sciatica    Personal history of other diseases of the nervous system and sense organs 04/17/2019    History of iritis    Personal history of other drug therapy 10/06/2021    History of influenza vaccination    Personal history of other medical treatment 02/22/2021    History of screening mammography    Personal history of other specified conditions 10/06/2021    History of excessive sweating    Personal history of other specified conditions 02/15/2022    History of headache    Personal history of other specified conditions 12/08/2020    History of shortness of  breath    Personal history of other specified conditions 12/19/2017    History of urinary frequency    Personal history of other specified conditions 04/03/2018    History of edema    Personal history of other specified conditions 07/08/2022    History of edema    Personal history of other specified conditions 04/12/2019    History of chronic pain    Personal history of urinary (tract) infections 07/15/2021    History of urinary tract infection    Polyosteoarthritis, unspecified 11/12/2020    Osteoarthritis of multiple joints    Presence of intraocular lens     Presence of artificial intra-ocular lens    Presence of intraocular lens 03/10/2022    Pseudophakia of both eyes    Presence of spectacles and contact lenses     Wears eyeglasses    Shortness of breath 06/30/2022    SOB (shortness of breath) on exertion    Spinal stenosis, lumbar region without neurogenic claudication 02/15/2022    Lumbar canal stenosis    Unilateral primary osteoarthritis, left knee 05/07/2020    Primary osteoarthritis of left knee    Unspecified disorder of refraction 03/10/2022    Refractive error    Unspecified epiphora, unspecified side     Eye tearing    Urinary tract infection, site not specified 12/19/2017    Recurrent UTI       Current Outpatient Medications on File Prior to Visit   Medication Sig Dispense Refill    apixaban (Eliquis) 2.5 mg tablet Take 1 tablet (2.5 mg) by mouth 2 times a day. 180 tablet 3    aspirin 81 mg EC tablet Take 1 tablet (81 mg) by mouth once daily.      calcium phosphate-vitamin D3 250 mg-12.5 mcg (500 unit) tablet,chewable Chew 1 tablet once daily.      cholecalciferol (Vitamin D-3) 50 mcg (2,000 unit) capsule Take 1 capsule (50 mcg) by mouth once daily.      denosumab (Prolia) 60 mg/mL syringe 60 mg subq every 6 months 1 mL 3    diclofenac sodium (Voltaren) 1 % gel gel Apply topically 3 times a day as needed. (Patient not taking: Reported on 12/17/2024)      empagliflozin (Jardiance) 25 mg Take 1 tablet  "(25 mg) by mouth once daily. 90 tablet 3    esomeprazole (NexIUM) 20 mg DR capsule Take 1 capsule (20 mg) by mouth once daily.      estradiol (Estrace) 0.01 % (0.1 mg/gram) vaginal cream Place a dime sized amount vaginally nightly for 2 weeks then 3 times a week thereafter. 42.5 g 3    flecainide (Tambocor) 50 mg tablet Take 1 tablet (50 mg) by mouth 2 times a day. 180 tablet 3    fluconazole (Diflucan) 150 mg tablet Take 1 tablet by mouth now and repeat in 3 days. 2 tablet 0    furosemide (Lasix) 40 mg tablet Take 1 tablet (40 mg) by mouth once daily. PRN swelling      losartan (Cozaar) 100 mg tablet Take 1 tablet (100 mg) by mouth once daily. 90 tablet 3    lubricating eye drops ophthalmic solution 1 drop if needed for dry eyes.      metoprolol tartrate (Lopressor) 25 mg tablet Take 1 tablet (25 mg) by mouth 2 times a day. 180 tablet 3    rosuvastatin (Crestor) 40 mg tablet Take 1 tablet (40 mg) by mouth once daily. 90 tablet 3    trimethoprim (Trimpex) 100 mg tablet Take 1 tablet (100 mg) by mouth once daily. Start daily after completing ciprofloxacin. 30 tablet 2     No current facility-administered medications on file prior to visit.         RELEVANT LAB RESULTS  Lab Results   Component Value Date    BILITOT 0.3 09/19/2024    CALCIUM 9.2 09/19/2024    CO2 26 09/19/2024     (H) 09/19/2024    CREATININE 1.00 09/19/2024    GLUCOSE 104 (H) 09/19/2024    ALKPHOS 70 09/19/2024    K 4.3 09/19/2024    PROT 6.3 (L) 09/19/2024     09/19/2024    AST 14 09/19/2024    ALT 13 09/19/2024    BUN 18 09/19/2024    ANIONGAP 11 09/19/2024    MG 2.00 03/17/2023    PHOS 3.8 02/15/2024    ALBUMIN 3.9 09/19/2024    GFRF 58 (A) 07/19/2023     Lab Results   Component Value Date    TRIG 149 07/09/2024    CHOL 150 07/09/2024    LDLCALC 75 07/09/2024    HDL 45.6 07/09/2024     No results found for: \"BMCBC\", \"CBCDIF\"     PHARMACEUTICAL ASSESSMENT    MEDICATION RECONCILIATION    Drug Interactions? No    Medication Documentation " Review Audit       Reviewed by CAIO Solomon (Nurse Practitioner) on 12/17/24 at 1527      Medication Order Taking? Sig Documenting Provider Last Dose Status   apixaban (Eliquis) 2.5 mg tablet [id number] Yes Take 1 tablet (2.5 mg) by mouth 2 times a day. CAIO Solomon  Active   aspirin 81 mg EC tablet [id number] Yes Take 1 tablet (81 mg) by mouth once daily. Historical Provider, MD  Active   calcium phosphate-vitamin D3 250 mg-12.5 mcg (500 unit) tablet,chewable [id number] Yes Chew 1 tablet once daily. Historical Provider, MD  Active   cholecalciferol (Vitamin D-3) 50 mcg (2,000 unit) capsule [id number] Yes Take 1 capsule (50 mcg) by mouth once daily. Historical Provider, MD  Active   denosumab (Prolia) 60 mg/mL syringe [id number] Yes 60 mg subq every 6 months Christopher D'Amico, DO  Active   diclofenac sodium (Voltaren) 1 % gel gel [id number]  Apply topically 3 times a day as needed.   Patient not taking: Reported on 12/17/2024    Historical Provider, MD  Active   empagliflozin (Jardiance) 25 mg [id number] Yes Take 1 tablet (25 mg) by mouth once daily. CAIO Solomon  Active   esomeprazole (NexIUM) 20 mg DR capsule [id number] Yes Take 1 capsule (20 mg) by mouth once daily. Historical Provider, MD  Active   estradiol (Estrace) 0.01 % (0.1 mg/gram) vaginal cream [id number] Yes Place a dime sized amount vaginally nightly for 2 weeks then 3 times a week thereafter. Guevara Sainz MD  Active   flecainide (Tambocor) 50 mg tablet [id number] Yes Take 1 tablet (50 mg) by mouth 2 times a day. Sebastian White MD  Active   fluconazole (Diflucan) 150 mg tablet [id number]  Take 1 tablet by mouth now and repeat in 3 days. Guevara Sainz MD  Active   furosemide (Lasix) 40 mg tablet [id number] Yes Take 1 tablet (40 mg) by mouth once daily. PRN swelling Historical Provider, MD  Active   losartan (Cozaar) 100 mg tablet [id number] Yes Take 1 tablet (100 mg) by mouth once daily. Balwinder Lawler MD  Active    lubricating eye drops ophthalmic solution [id number] Yes 1 drop if needed for dry eyes. Historical Provider, MD  Active   metoprolol tartrate (Lopressor) 25 mg tablet [id number] Yes Take 1 tablet (25 mg) by mouth 2 times a day. Sebastian White MD  Active   rosuvastatin (Crestor) 40 mg tablet [id number] Yes Take 1 tablet (40 mg) by mouth once daily. Balwinder Lawler MD  Active   trimethoprim (Trimpex) 100 mg tablet [id number] Yes Take 1 tablet (100 mg) by mouth once daily. Start daily after completing ciprofloxacin. Guevara Sainz MD  Active                    DISEASE MANAGEMENT ASSESSMENT:     ANTICOAGULATION ASSESSMENT    The ASCVD Risk score (Thomas DK, et al., 2019) failed to calculate for the following reasons:    The 2019 ASCVD risk score is only valid for ages 40 to 79    DIAGNOSIS: prevention of nonvalvular atrial fibrilliation stroke and systemic embolism  - Patient is projected to be on anticoagulation indefinitely  - [id number] Score: [6] (only included if diagnosis is atrial fibrillation)   Age: [<65 (0)] [65-74 (+1)] [> 75 (+2)]: 2  Sex: [Male/Female (+1)]: 1  CHF history: [No/Yes(+1)]: 1  Hypertension history: [No/Yes(+1)]: 1  Stroke/TIA/thromboembolism history: [No/Yes(+2)]: 0  Vascular disease history (prior MI, peripheral artery disease, aortic plaque): [No/Yes(+1)]: 0  Diabetes history: [No/Yes(+1)]: 1    CURRENT PHARMACOTHERAPY:    Eliquis 2.5mg twice daily  83yo  88.8kg  Scr 1.00 (09/19/2024)  Provider prefers lower dose due to kidney function     RELEVANT PAST MEDICAL HISTORY:   Afib, HTN, CHF, HLD    Affordability/Accessibility:  PAP   Adherence/Organization: reports adherence, uses pillbox  Adverse Reactions: none reported  Recent Hospitalizations: none  Recent Falls/Trauma: Yes; patient fell in October and fractured her wrist imaging of head negative, no falls since.  Changes in Tobacco or Alcohol Intake:   Tobacco: does not use  Alcohol: seldom    EDUCATION/COUNSELING:   - Counseled  patient on MOA, expectations, duration of therapy, contraindications, administration, and monitoring parameters  - Counseled patient of side effects that are indicative of bleeding such as dark tarry stool, unexplainable bruising, or vomiting up a coffee ground like substance  -Counseled patient on the importance of going to the ER/seeing a provider after falls or physical trauma.     CHF ASSESSMENT     Symptom/Staging:  -Most recent ejection fraction: 55-60%  -NYHA Stage: unknown    Results for orders placed in visit on 04/12/22    Echocardiogram    Narrative  [facility], [address] [place] [address]  Tel [phone] and Fax [phone]    TRANSTHORACIC ECHOCARDIOGRAM REPORT      Patient Name:     DANETTE TAVO COHEN Reading Physician:   [id number] Balwinder Perez MD  Study Date:       4/12/2022          Referring Physician: BALWINDER PEREZ  MRN/PID:          [id number]           PCP:  Accession/Order#: [id number]       Department Location: [facility]  YOB: 1940          Fellow:  Gender:           F                  Nurse:  Admit Date:                          Sonographer:         Ender Knight [facility]  Admission Status: Outpatient         Additional Staff:  Height:           152.40 cm          CC Report to:  Weight:           92.99 kg           Study Type:          Echocardiogram  BSA:              1.89 m2  Blood Pressure: 156 /71 mmHg    Diagnosis/ICD: I49.9-Cardiac arrhythmia, unspecified; R01.1-Cardiac murmur,  unspecified  Indication:    Cardiac arrhythmia; Systolic murmur  Procedure/CPT: Echo Complete w Full [id number]    Patient History:  Pertinent History: Edema; HTN; SOB; DM II; PPM; chronic diastolic heart failure;  dyspnea; DLD; ALEXANDER; arrhythmia; obesity; systolic murmur.    Study Detail: The following Echo studies were performed: 2D, M-Mode, Doppler and  color flow. Technically challenging study due to body habitus.      PHYSICIAN INTERPRETATION:  Left Ventricle:  The left ventricular systolic function is normal, with an estimated ejection fraction of 55-60%. The calculated ejection fraction is normal at 66 % using the Mcnair's Bi-plane MOD calculation. There are no regional wall motion abnormalities. The left ventricular cavity size is normal. Abnormal (paradoxical) septal motion, consistent with RV pacemaker. Spectral Doppler shows a normal pattern of left ventricular diastolic filling. There is mild hypokinesis and dyssynchrony of the anteroseptal wall.  Left Atrium: The left atrium is mild to moderately dilated.  Right Ventricle: The right ventricle is normal in size. There is normal right ventriclar wall thickness. There is normal right ventricular global systolic function. A device is visualized in the right ventricle.  Right Atrium: The right atrium is normal in size. There is a device visualized in the right atrium.  Aortic Valve: The aortic valve appears structurally normal. The aortic valve appears tricuspid and non-restricted. There is no evidence of aortic valve regurgitation. The peak instantaneous gradient of the aortic valve is 20.3 mmHg. The mean gradient of the aortic valve is 12.0 mmHg.  Mitral Valve: The mitral valve is normal in structure. There is normal mitral valve leaflet mobility. There is mild mitral annular calcification. There is mild mitral valve regurgitation.  Tricuspid Valve: The tricuspid valve is structurally normal. There is normal tricuspid valve leaflet mobility. There is mild to moderate tricuspid regurgitation. The Doppler estimated RVSP is mildly elevated at 44.9 mmHg.  Pulmonic Valve: The pulmonic valve is structurally normal. There is trace pulmonic valve regurgitation.  Pericardium: There is no pericardial effusion noted.  Aorta: The aortic root is normal. The Asc Ao is 2.70 cm. There is no dilatation of the aortic arch. There is no dilatation of the ascending aorta. There is no dilatation of the aortic root. There is plaque  visualized in the ascending aorta, which is classified as a Grade 2 [mild (focal or diffuse) intimal thickening of 2-3 mm] atherosclerosis.  Pulmonary Artery: The pulmonary artery is normal in size. The tricuspid regurgitant velocity is 3.24 m/s, and with an estimated right atrial pressure of 3 mmHg, the estimated pulmonary artery pressure is mildly elevated with the RVSP at 41.6 mmHg. The estimated PASP is 45 mmHg.      CONCLUSIONS:  1. The left ventricular systolic function is normal with a 55-60% estimated ejection fraction.  2. Abnormal septal motion consistent with RV pacemaker.  3. There is mild hypokinesis and dyssynchrony of the anteroseptal wall.  4. The left atrium is mild to moderately dilated.  5. Mildly elevated RVSP.  6. There is mild to moderate tricuspid regurgitation.  7. The estimated PASP is 45 mmHg.  8. There is plaque visualized in the ascending aorta.    QUANTITATIVE DATA SUMMARY:  2D MEASUREMENTS:  Normal Ranges:  Ao Root d:     2.80 cm   (2.0-3.7cm)  LAs:           4.27 cm   (2.7-4.0cm)  IVSd:          1.27 cm   (0.6-1.1cm)  LVPWd:         0.88 cm   (0.6-1.1cm)  LVIDd:         4.56 cm   (3.9-5.9cm)  LVIDs:         3.28 cm  LV Mass Index: 91.6 g/m2  LV % FS        28.2 %    LA VOLUME:  Normal Ranges:  LA Vol A4C:       47.7 ml    (22+/-6mL/m2)  LA Vol A2C:       46.4 ml  LA Vol BP:        49.8 ml  LA Vol Index A4C: 25.3ml/m2  LA Vol Index A2C: 24.6 ml/m2  LA Vol Index BP:  26.4 ml/m2  LA Volume Index:  26.3 ml/m2  LA Vol A4C:       43.8 ml  LA Vol A2C:       44.6 ml    RA VOLUME BY A/L METHOD:  Normal Ranges:  RA Area A4C: 14.2 cm2    AORTA MEASUREMENTS:  Normal Ranges:  Asc Ao, d: 2.70 cm (2.1-3.4cm)    LV SYSTOLIC FUNCTION BY 2D PLANIMETRY (MOD):  Normal Ranges:  EF-A4C View: 69.6 % (>55%)  EF-A2C View: 62.6 %  EF-Biplane:  66.4 %    LV DIASTOLIC FUNCTION:  Normal Ranges:  MV Peak E:      1.14 m/s    (0.7-1.2 m/s)  MV Peak A:      0.85 m/s    (0.42-0.7 m/s)  E/A Ratio:      1.34         (1.0-2.2)  MV e'           0.07 m/s    (>8.0)  MV A Dur:       129.40 msec  E/e' Ratio:     16.34       (<8.0)  MV DT:          205 msec    (150-240 msec)  PulmV Sys [place]:  76.29 cm/s  PulmV Mao [place]: 62.44 cm/s  PulmV S/D [place]:  1.22    MITRAL VALVE:  Normal Ranges:  MV DT: 205 msec (150-240msec)    AORTIC VALVE:  Normal Ranges:  AoV Vmax:                2.25 m/s  (<1.7m/s)  AoV Peak P.3 mmHg (<20mmHg)  AoV Mean P.0 mmHg (1.7-11.5mmHg)  LVOT Max [place]:            1.30 m/s  (<1.1m/s)  AoV VTI:                 52.62 cm  (18-25cm)  LVOT VTI:                31.20 cm  LVOT Diameter:           1.77 cm   (1.8-2.4cm)  AoV Area, VTI:           1.46 cm2  (2.5-5.5cm2)  AoV Area,Vmax:           1.42 cm2  (2.5-4.5cm2)  AoV Dimensionless Index: 0.59    RIGHT VENTRICLE:  RV 1   3.6 cm  RV 2   2.8 cm  RV 3   5.5 cm  TAPSE: 25.0 mm  RV s'  0.12 m/s    TRICUSPID VALVE/RVSP:  Normal Ranges:  Peak TR Velocity: 3.24 m/s  RV Syst Pressure: 44.9 mmHg (< 30mmHg)  IVC Diam:         1.40 cm    PULMONIC VALVE:  Normal Ranges:  PV Max [place]: 1.0 m/s  (0.6-0.9m/s)  PV Max PG:  3.8 mmHg    Pulmonary Veins:  PulmV Mao [place]: 62.44 cm/s  PulmV S/D [place]:  1.22  PulmV Sys [place]:  76.29 cm/s      [id number] Balwinder Lawler MD  Electronically signed on 2022 at 9:52:01 AM         Final       Guideline-Directed Medical Therapy:  -ARNI: No   -If no, then ACEi/ARB?: Yes, describe: Losartan  100mg daily  -Beta Blocker: Yes, describe: metoprolol tartrate 25mg twice daily  -MRA: No  -SGLT2i: Yes, describe: Jardiance 25mg daily    Secondary Prevention:  -The ASCVD Risk score (Thomas STAPLES, et al., 2019) failed to calculate for the following reasons:    The 2019 ASCVD risk score is only valid for ages 40 to 79   -Aspirin 81mg? yes  -Statin?: Yes, describe: Rosuvastatin 40mg  daily  -HTN?: Yes    CURRENT PHARMACOTHERAPY:   Losartan 100mg daily  Metoprolol tartrate 25mg twice daily  Jardiance 25mg daily  Furosemide 40mg daily PRN  "    Affordability:  PAP   Adherence/Compliance: reports adherence  Adverse Effects: none reported    Monitoring Weights at Home: No- denies noticeable weight fluctuation  Home Weight Recordings: N/A    Past In Office Weight Readings:   Wt Readings from Last 6 Encounters:   12/17/24 88.8 kg (195 lb 11.2 oz)   12/11/24 84.8 kg (187 lb)   11/26/24 84.8 kg (187 lb)   11/14/24 87.8 kg (193 lb 9.6 oz)   11/13/24 87.5 kg (193 lb)   11/06/24 87.5 kg (193 lb)       Monitoring Blood Pressure at Home: No  Home BP Recordings: unable to assess    Past In Office BP Readings:   BP Readings from Last 6 Encounters:   12/17/24 123/69   11/26/24 123/64   11/14/24 136/68   10/23/24 137/69   10/20/24 175/62   09/26/24 126/63       HEALTH MANAGEMENT    Maintaining fluid restriction (<2 L/day): No  Edema/swelling: Yes -patient states this does not happen frequently, only \"every now and again\". Patient reports her legs get heavy and tight, she will take furosemide and the swelling resolves.  Shortness of breath: Yes on exertion, walking from kitchen to living room-- patient states this has significantly improved since starting Jardiance.  Trouble sleeping/lying down: No  Dry/hacking cough: No  Recent Hospitalizations: None since wrist fracture    EDUCATION/COUNSELING:   - Counseled patient on MOA, expectations, duration of therapy, contraindications, administration, and monitoring parameters  - Counseled patient on lifestyle modifications that can decrease your risk of having complications (smoking cessation, losing weight, daily weights, vaccines)  - Counseled patient on fluid intake and weight management. Recommended to not consume more than 2 liters of fliuids per day. If they have gained more than 2-3 pounds within a 24 hours period (or 5 pounds in a week), contact their cardiologist  - Answered all patient questions and concerns     DISCUSSION/NOTES:   Patient reports doing well on Eliquis, reports adherence, no adverse effects and " denies s/s of bleeding. Patient reports she fell and fractured her wrist in October, imaging of head was negative, denies other falls/hospitalizations since.  Patient reports tolerating her heart failure regimen. At last follow up visit Marcelle reported lightheadedness 1-2x weekly, she reports this has since resolved. Marcelle reports adherence and denies s/s of worsening heart failure. Marcelle reports in late November she saw urologist for UTI, patient was treated with cipro empirically and then changed to trimethoprim. Explained to patient this is a side effect of Jardiance, she reports no other urinary symptoms currently. Will continue to monitor urinary symptoms and encourage hydration, if patient develops recurrent UTI, will plan to stop Jardiance.  Patient continues to reports a pain through her back and legs, recommend she contact PCP for further evaluation.  Patient aware at next follow up visit we will be discussing [facility] PAP renewal.       ASSESSMENT AND PLAN:    Assessment/Plan   Problem List Items Addressed This Visit       Chronic diastolic congestive heart failure     Patient currently on 3 of 4 GDMT medications. Recent renal function and potassium level appropriate to continue current GDMT regimen. Patient developed UTI 2 months ago while on Jardiance, continue to monitor urinary symptoms.     Labs (09/19/2024): K-4.3 Scr-1.00 eGFR-55         Relevant Orders    Referral to Clinical Pharmacy    Cardiac arrhythmia - Primary     Patient age, weight and renal function appropriate for Eliquis 5mg twice daily, however per provider patient needs lower dose due to kidney function (Scr 1.00 09/19/2024). Patient is 83yo and 88.8kg.         Relevant Orders    Referral to Clinical Pharmacy       RECOMMENDATIONS/PLAN    CONTINUE  Eliquis 2.5mg twice daily (Provider prefers lower dose due to kidney function)   Losartan 100mg daily  Metoprolol tartrate 25mg twice daily  Jardiance 25mg daily    Last Appnt with Referring  Provider: 12/17/2024  Next Appnt with Referring Provider: 06/24/2025  Clinical Pharmacist follow up: 5 months  VAF/Application Expiration: Yes    Date: 07/05/2025  Type of Encounter: Leeann Bob PharmD    Verbal consent to manage patient's drug therapy was obtained from the patient . They were informed they may decline to participate or withdraw from participation in pharmacy services at any time.    Continue all meds under the continuation of care with the referring provider and clinical pharmacy team.

## 2025-01-08 NOTE — ASSESSMENT & PLAN NOTE
Patient age, weight and renal function appropriate for Eliquis 5mg twice daily, however per provider patient needs lower dose due to kidney function (Scr 1.00 09/19/2024). Patient is 85yo and 88.8kg.

## 2025-01-08 NOTE — ASSESSMENT & PLAN NOTE
Continue Eliquis 2.5mg twice daily (85yo, 88.8kg, Scr 1.00). Provider prefers lower dose due to kidney function

## 2025-01-08 NOTE — ASSESSMENT & PLAN NOTE
Patient currently on 3 of 4 GDMT medications. Recent renal function and potassium level appropriate to continue current GDMT regimen. Patient developed UTI 2 months ago while on Jardiance, continue to monitor urinary symptoms.     Labs (09/19/2024): K-4.3 Scr-1.00 eGFR-55

## 2025-01-08 NOTE — Clinical Note
Nighat Caal,  Today I spoke with Marcelle about her Eliquis and heart medications. Patient reports doing well on Eliquis, reports adherence, no adverse effects and denies s/s of bleeding. Patient reports she fell and fractured her wrist in October. Patient reports tolerating her heart failure regimen. Marcelle reports adherence and denies s/s of worsening heart failure. Marcelle was treated for UTI in November she reports having no current urinary symptoms. Plan to continue Jardiance for now if patient has recurrent UTIs will stop medication. Continue all other medications as prescribed and follow up in 5 months. Thank you!

## 2025-01-09 DIAGNOSIS — R29.898 WEAKNESS OF BOTH LOWER EXTREMITIES: Primary | ICD-10-CM

## 2025-01-09 DIAGNOSIS — M79.605 BILATERAL LOWER EXTREMITY PAIN: ICD-10-CM

## 2025-01-09 DIAGNOSIS — M79.604 BILATERAL LOWER EXTREMITY PAIN: ICD-10-CM

## 2025-01-15 ENCOUNTER — APPOINTMENT (OUTPATIENT)
Dept: ORTHOPEDIC SURGERY | Facility: CLINIC | Age: 85
End: 2025-01-15
Payer: MEDICARE

## 2025-01-15 DIAGNOSIS — M17.0 PRIMARY OSTEOARTHRITIS OF BOTH KNEES: Primary | ICD-10-CM

## 2025-01-15 PROCEDURE — 20610 DRAIN/INJ JOINT/BURSA W/O US: CPT | Performed by: ORTHOPAEDIC SURGERY

## 2025-01-15 PROCEDURE — RXMED WILLOW AMBULATORY MEDICATION CHARGE

## 2025-01-15 RX ORDER — LIDOCAINE HYDROCHLORIDE 10 MG/ML
4 INJECTION, SOLUTION INFILTRATION; PERINEURAL
Status: COMPLETED | OUTPATIENT
Start: 2025-01-15 | End: 2025-01-15

## 2025-01-15 RX ORDER — TRIAMCINOLONE ACETONIDE 40 MG/ML
1 INJECTION, SUSPENSION INTRA-ARTICULAR; INTRAMUSCULAR
Status: COMPLETED | OUTPATIENT
Start: 2025-01-15 | End: 2025-01-15

## 2025-01-15 RX ADMIN — TRIAMCINOLONE ACETONIDE 1 ML: 40 INJECTION, SUSPENSION INTRA-ARTICULAR; INTRAMUSCULAR at 13:30

## 2025-01-15 RX ADMIN — LIDOCAINE HYDROCHLORIDE 4 ML: 10 INJECTION, SOLUTION INFILTRATION; PERINEURAL at 13:30

## 2025-01-15 NOTE — PROGRESS NOTES
L Inj/Asp: bilateral knee on 1/15/2025 1:30 PM  Indications: pain and joint swelling  Details: 22 G needle, anterolateral approach  Medications (Right): 1 mL triamcinolone acetonide 40 mg/mL; 4 mL lidocaine 10 mg/mL (1 %)  Medications (Left): 1 mL triamcinolone acetonide 40 mg/mL; 4 mL lidocaine 10 mg/mL (1 %)  Outcome: tolerated well, no immediate complications    Discussion:  I discussed the conservative treatment options for knee osteoarthritis including but not limited to physical therapy, oral NSAIDS, activity and lifestyle modification, and corticosteroid injections. Pt has elected to undergo a cortisone injection today. I have explained the risk and benefits of an injection including the possibility of joint infection, bleeding, damage to cartilage, allergic reaction. Patient verbalized understanding and gave verbal consent wishes to proceed with a intra-articular cortisone injection for their knee.    Procedure:  After discussing the risk and benefits of the procedure, we proceeded with an intra-articular bilateral knee injection.    With the patient's informed verbal consent, the bilateral knees were prepped in standard sterile fashion with Chlorhexidine. The skin was then anesthetized with ethyl chloride spray and cleaned again with Chlorhexidine. The bilateral knees were then apirated/injected with a prefilled 20-gauge syringe of 40 mg Kenalog + 4 ml Lidocaine using the lateral approach without complications.  The patient tolerated this well and felt immediate initial relief of symptoms. A bandaid was applied and the patient ambulated out of the clinic on ther own accord without difficulty. Patient was instructed to avoid physical activity for 24-48 hours to prevent the knees from swelling and may ice the knees as tolerated. Patient should contact the office if any signs of of infection appear: redness, fever, chills, drainage, swelling or warmth to the knees.  Pt understands that the injections can be  repeated no sooner than 3 months.      Procedure, treatment alternatives, risks and benefits explained, specific risks discussed. Consent was given by the patient. Immediately prior to procedure a time out was called to verify the correct patient, procedure, equipment, support staff and site/side marked as required. Patient was prepped and draped in the usual sterile fashion.

## 2025-01-16 ENCOUNTER — PHARMACY VISIT (OUTPATIENT)
Dept: PHARMACY | Facility: CLINIC | Age: 85
End: 2025-01-16
Payer: MEDICARE

## 2025-01-24 ENCOUNTER — OFFICE VISIT (OUTPATIENT)
Dept: URGENT CARE | Age: 85
End: 2025-01-24
Payer: MEDICARE

## 2025-01-24 VITALS
DIASTOLIC BLOOD PRESSURE: 49 MMHG | RESPIRATION RATE: 17 BRPM | BODY MASS INDEX: 38.68 KG/M2 | OXYGEN SATURATION: 96 % | HEART RATE: 76 BPM | TEMPERATURE: 97.4 F | WEIGHT: 197 LBS | SYSTOLIC BLOOD PRESSURE: 141 MMHG | HEIGHT: 60 IN

## 2025-01-24 DIAGNOSIS — J01.90 ACUTE SINUSITIS, RECURRENCE NOT SPECIFIED, UNSPECIFIED LOCATION: Primary | ICD-10-CM

## 2025-01-24 RX ORDER — DOXYCYCLINE 100 MG/1
100 CAPSULE ORAL 2 TIMES DAILY
Qty: 20 CAPSULE | Refills: 0 | Status: SHIPPED | OUTPATIENT
Start: 2025-01-24 | End: 2025-02-03

## 2025-01-24 ASSESSMENT — ENCOUNTER SYMPTOMS
SORE THROAT: 1
COUGH: 1
SINUS PRESSURE: 1
RHINORRHEA: 1
FEVER: 0
CHILLS: 0
DIAPHORESIS: 0
SHORTNESS OF BREATH: 0

## 2025-01-24 NOTE — PATIENT INSTRUCTIONS
take tylenol 1000mg every 8 hours for discomfort/fever.     Use saline nasal spray, for nasal congestion/runny nose.    take antihistamine during the day, ex zyrtec, claritin, etc, for nasal congestion/runny nose.    gargle with 1tsp salt in 8 oz of warm water/listerine original, for sore throat.    use cepacol throat lozenges/chloroseptic spray, for sore throat.    finish all antibiotics, doxycycline    follow up with primary care if no improvement in 3 - 4 days.

## 2025-01-27 DIAGNOSIS — E78.5 HYPERLIPIDEMIA: Primary | ICD-10-CM

## 2025-01-27 RX ORDER — ROSUVASTATIN CALCIUM 40 MG/1
40 TABLET, COATED ORAL DAILY
Qty: 90 TABLET | Refills: 3 | Status: SHIPPED | OUTPATIENT
Start: 2025-01-27 | End: 2026-01-27

## 2025-02-04 ENCOUNTER — APPOINTMENT (OUTPATIENT)
Dept: PRIMARY CARE | Facility: CLINIC | Age: 85
End: 2025-02-04
Payer: MEDICARE

## 2025-02-05 DIAGNOSIS — M81.0 OSTEOPOROSIS, UNSPECIFIED OSTEOPOROSIS TYPE, UNSPECIFIED PATHOLOGICAL FRACTURE PRESENCE: Primary | ICD-10-CM

## 2025-02-06 ENCOUNTER — OFFICE VISIT (OUTPATIENT)
Dept: CARDIOLOGY | Facility: CLINIC | Age: 85
End: 2025-02-06
Payer: MEDICARE

## 2025-02-06 VITALS
DIASTOLIC BLOOD PRESSURE: 66 MMHG | HEIGHT: 60 IN | SYSTOLIC BLOOD PRESSURE: 122 MMHG | HEART RATE: 76 BPM | OXYGEN SATURATION: 94 % | BODY MASS INDEX: 37.33 KG/M2 | WEIGHT: 190.13 LBS

## 2025-02-06 DIAGNOSIS — I50.32 CHRONIC DIASTOLIC CONGESTIVE HEART FAILURE: Primary | ICD-10-CM

## 2025-02-06 DIAGNOSIS — R06.09 DYSPNEA ON EXERTION: ICD-10-CM

## 2025-02-06 DIAGNOSIS — I10 HYPERTENSION, UNSPECIFIED TYPE: ICD-10-CM

## 2025-02-06 PROCEDURE — 99214 OFFICE O/P EST MOD 30 MIN: CPT | Performed by: INTERNAL MEDICINE

## 2025-02-06 PROCEDURE — G2211 COMPLEX E/M VISIT ADD ON: HCPCS | Performed by: INTERNAL MEDICINE

## 2025-02-06 PROCEDURE — 3078F DIAST BP <80 MM HG: CPT | Performed by: INTERNAL MEDICINE

## 2025-02-06 PROCEDURE — 1126F AMNT PAIN NOTED NONE PRSNT: CPT | Performed by: INTERNAL MEDICINE

## 2025-02-06 PROCEDURE — 1159F MED LIST DOCD IN RCRD: CPT | Performed by: INTERNAL MEDICINE

## 2025-02-06 PROCEDURE — 3074F SYST BP LT 130 MM HG: CPT | Performed by: INTERNAL MEDICINE

## 2025-02-06 ASSESSMENT — COLUMBIA-SUICIDE SEVERITY RATING SCALE - C-SSRS
6. HAVE YOU EVER DONE ANYTHING, STARTED TO DO ANYTHING, OR PREPARED TO DO ANYTHING TO END YOUR LIFE?: NO
2. HAVE YOU ACTUALLY HAD ANY THOUGHTS OF KILLING YOURSELF?: NO
1. IN THE PAST MONTH, HAVE YOU WISHED YOU WERE DEAD OR WISHED YOU COULD GO TO SLEEP AND NOT WAKE UP?: NO

## 2025-02-06 ASSESSMENT — PATIENT HEALTH QUESTIONNAIRE - PHQ9
9. THOUGHTS THAT YOU WOULD BE BETTER OFF DEAD, OR OF HURTING YOURSELF: NOT AT ALL
10. IF YOU CHECKED OFF ANY PROBLEMS, HOW DIFFICULT HAVE THESE PROBLEMS MADE IT FOR YOU TO DO YOUR WORK, TAKE CARE OF THINGS AT HOME, OR GET ALONG WITH OTHER PEOPLE: NOT DIFFICULT AT ALL
5. POOR APPETITE OR OVEREATING: SEVERAL DAYS
2. FEELING DOWN, DEPRESSED OR HOPELESS: NEARLY EVERY DAY
SUM OF ALL RESPONSES TO PHQ9 QUESTIONS 1 AND 2: 6
7. TROUBLE CONCENTRATING ON THINGS, SUCH AS READING THE NEWSPAPER OR WATCHING TELEVISION: NOT AT ALL
SUM OF ALL RESPONSES TO PHQ QUESTIONS 1-9: 13
1. LITTLE INTEREST OR PLEASURE IN DOING THINGS: NEARLY EVERY DAY
3. TROUBLE FALLING OR STAYING ASLEEP OR SLEEPING TOO MUCH: SEVERAL DAYS
4. FEELING TIRED OR HAVING LITTLE ENERGY: SEVERAL DAYS
6. FEELING BAD ABOUT YOURSELF - OR THAT YOU ARE A FAILURE OR HAVE LET YOURSELF OR YOUR FAMILY DOWN: SEVERAL DAYS
8. MOVING OR SPEAKING SO SLOWLY THAT OTHER PEOPLE COULD HAVE NOTICED. OR THE OPPOSITE, BEING SO FIGETY OR RESTLESS THAT YOU HAVE BEEN MOVING AROUND A LOT MORE THAN USUAL: NEARLY EVERY DAY

## 2025-02-06 ASSESSMENT — PAIN SCALES - GENERAL: PAINLEVEL_OUTOF10: 0-NO PAIN

## 2025-02-06 ASSESSMENT — ENCOUNTER SYMPTOMS
OCCASIONAL FEELINGS OF UNSTEADINESS: 1
LOSS OF SENSATION IN FEET: 1
DEPRESSION: 1

## 2025-02-06 NOTE — PATIENT INSTRUCTIONS
You were seen in the [facility] for your shortness of breath and leg swelling due to chronic diastolic heart failure.     Your symptoms are partly due to diastolic heart failure. Diastolic heart failure happens when the heart muscle gets stiff and relaxes slowly between heart beats as it fills with blood. This causes the body to hold on to too much salt and fluid making your shortness of breath worse. Your medical conditions of high blood pressure, type 2 diabetes, atrial fibrillation, and sleep apnea on CPAP have contributed to you developing diastolic heart failure.     Continue Jardiance to 25 mg a day. This medication is a SGLT2 inhibitor and can help reduce heart attack risk by up to 20% and reduce your risk of being admitted to the hospital because of diastolic heart failure by 50%. This medication works by forcing the kidneys to lose  gram of blood sugar a day which helps lower blood sugar levels, blood pressure, and cholesterol levels. Jardiance will help treat your diastolic heart failure by lowering your fluid level inside your heart and blood vessels and help reduce your ankle swelling. This will likely help with your shortness of breath as well and was first approved as a diabetes medication to control blood sugar.     Take a pill of lasix as needed if you gain more than 3 lbs in 24 hours     Exercise Prescription:  Do aerobic exercise in intervals (3 minutes of exercise, 3 minutes of rest) for 4 cycles each day starting 3 times a week and build up to 5 times a week.We are doing interval therapy because the pressure inside your heart builds up over several minutes but returns to normal after resting for 3 minutes. Aerobic exercise can help strengthen your leg muscles and help treat your knee arthritis which is sending some shortness of breath signals to your heart.     You do not need to take aspirin. Please stop taking it.     Follow up with Dr. Zhou in 12 months.

## 2025-02-06 NOTE — PROGRESS NOTES
Subjective   Marcelle Hewitt is a 84 y.o. female who presents to the [facility] for follow up of chronic exertional dyspnea. Last seen in February 2023.     Since our last visit, Ms. Hewitt has had a small improvement in exertional dyspnea taking Jardiance 25 mg. Initially started on Jardiance 10 mg a day at start of 2022. 10 lb weight loss since starting SGLT2i.     Less pedal edema (now trace). Has not had to use prn lasix at all lately. At last visit, was using it 2x a week.     No active cardiac symptoms of chest pain, PND, orthopnea, ARIANA, palpitations, syncope, or claudication. Still CANTRELL limited by 1 flight of stairs. Found to be in atrial fibrillation on device check in spring 2023 and now taking Eliquis for CVA prevention.    Dyspnea History:  She has a several year history of worsening exertional dyspnea. Now CANTRELL with 1 flight of stairs or 1-2 blocks of walking. Dyspnea worst with bending over. Progressively worsened up until February 2022 visit with Dr. Lal and myself at [facility] visit. 1/3/2022 V/Q scan showed low probability for PE. 2022 CT chest showed no acute cardiopulm process, no IPF, atelectasis, and stable lung nodule per report.     Past Medical History:  1. Chronic diastolic heart failure  2. Hypertension  3. Dyslipidemia  4. Type 2 diabetes mellitus  5. Dyslipidemia  6. ALEXANDER on CPAP x 10 years  7. 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 Follow by Dr. White.     Social History:  Never a smoker     Family History:  No premature CAD in 1st degree relatives ( 55 years of age for male relatives, 65 years of age for female relatives)     Review of Systems    A 14 point review of systems was asked. All questions were negative except for pertinent positives listed in the HPI.     Current Outpatient Medications on File Prior to Visit   Medication Sig Dispense Refill    apixaban (Eliquis) 2.5 mg tablet Take 1 tablet (2.5 mg) by mouth 2 times a  day. 180 tablet 3    aspirin 81 mg EC tablet Take 1 tablet (81 mg) by mouth once daily.      calcium phosphate-vitamin D3 250 mg-12.5 mcg (500 unit) tablet,chewable Chew 1 tablet once daily.      cholecalciferol (Vitamin D-3) 50 mcg (2,000 unit) capsule Take 1 capsule (50 mcg) by mouth once daily.      denosumab (Prolia) 60 mg/mL syringe 60 mg subq every 6 months 1 mL 3    diclofenac sodium (Voltaren) 1 % gel gel Apply topically 3 times a day as needed.      empagliflozin (Jardiance) 25 mg Take 1 tablet (25 mg) by mouth once daily. 90 tablet 3    esomeprazole (NexIUM) 20 mg DR capsule Take 1 capsule (20 mg) by mouth once daily.      estradiol (Estrace) 0.01 % (0.1 mg/gram) vaginal cream Place a dime sized amount vaginally nightly for 2 weeks then 3 times a week thereafter. 42.5 g 3    flecainide (Tambocor) 50 mg tablet Take 1 tablet (50 mg) by mouth 2 times a day. 180 tablet 3    fluconazole (Diflucan) 150 mg tablet Take 1 tablet by mouth now and repeat in 3 days. 2 tablet 0    furosemide (Lasix) 40 mg tablet Take 1 tablet (40 mg) by mouth once daily. PRN swelling      losartan (Cozaar) 100 mg tablet Take 1 tablet (100 mg) by mouth once daily. 90 tablet 3    lubricating eye drops ophthalmic solution 1 drop if needed for dry eyes.      metoprolol tartrate (Lopressor) 25 mg tablet Take 1 tablet (25 mg) by mouth 2 times a day. 180 tablet 3    rosuvastatin (Crestor) 40 mg tablet Take 1 tablet (40 mg) by mouth once daily. 90 tablet 3    [] doxycycline (Vibramycin) 100 mg capsule Take 1 capsule (100 mg) by mouth 2 times a day for 10 days. Take with at least 8 ounces (large glass) of water, do not lie down for 30 minutes after 20 capsule 0    trimethoprim (Trimpex) 100 mg tablet Take 1 tablet (100 mg) by mouth once daily. Start daily after completing ciprofloxacin. (Patient not taking: Reported on 2025) 30 tablet 2     No current facility-administered medications on file prior to visit.      Objective   Physical  Exam  BP Readings from Last 3 Encounters:   25 122/66   25 (!) 141/49   24 123/69      Wt Readings from Last 3 Encounters:   25 86.2 kg (190 lb 2 oz)   25 89.4 kg (197 lb)   24 88.8 kg (195 lb 11.2 oz)      BMI: Estimated body mass index is 37.13 kg/m² as calculated from the following:    Height as of this encounter: 1.524 m (5').    Weight as of this encounter: 86.2 kg (190 lb 2 oz).  BSA: Estimated body surface area is 1.91 meters squared as calculated from the following:    Height as of this encounter: 1.524 m (5').    Weight as of this encounter: 86.2 kg (190 lb 2 oz).    General: no acute distress  HEENT: EOMI, no scleral icterus.  Lungs: Clear to auscultation bilaterally without wheezing, rales, or rhonchi.  Cardiovascular: Regular rhythm and rate. Normal S1 and S2. No murmurs, rubs, or gallops are appreciated. JVP normal.  Abdomen: Soft, nontender, nondistended. Bowel sounds present.  Extremities: Warm and well perfused with equal 2+ pulses bilaterally.  No edema present.  Neurologic: Alert and oriented x3.    I have personally reviewed the following images and laboratory findings:  Last echocardiogram:  2021 echocardiogram: LV EF 60-65%, no LVH (LVMI 86 gm/m2), indeterminate diastology (E/e' 15), normal LA size (THANIA 28 ml/m2), normal RV/RA, DCPM wires seen, no AI, no MR, mild TR, RVSP 45 mm Hg (RAP 3 mm Hg).    Last cath / stress test:  2018 [facility] ([place], [place]): Normal coronary arteries, LV EF 60% by V-gram, LVEDP 27 mm Hg.    Most recent EC2023 ECG: Atrial paced at 78 bpm. Personally reviewed in office.     Assessment/Plan   1. Chronic dyspnea:  Multi-factorial dyspnea due to the following causes: Chronic diastolic heart failure, hypertension, type 2 diabetes, skeletal muscle deconditioning, osteoarthritis, ALEXANDER.     She had chronic diastolic heart failure on review of prior LHC in  (LVEDP 27 mm Hg) and   echocardiogram (E/e' 15) indicating increase LV filling pressure. Borderline type 2 diabetes diagnosis (A1c 6.8%).     Continue Jardiance 25 mg a day. Had 10-15 lb weight loss starting SGLT2i and fluid levels are now euvolemic on exam today. Taking furosemide 40 mg as needed. Can take PRN for 2 lb weight gain or increase in ARIANA that morning. Has had 3 UTIs recently. However, her dyspnea has improved significantly since starting jardiance so the benefit exceeds risk of UTI.      Discussed interval exercise program today. Movement for 3-4 minutes and then rest 3-4 minutes. Her knee arthritis is contributing dyspnea signaling to her brain along with muscle deconditioning signalling. Exercise training will help address both of these dyspnea contributors.     Instructed patient to check home BP trend with goal 120-130 mm Hg. Blood pressure at goal today. Increase aerobic exercise activity for blood pressure control and biofeedback training to reduce exertional dyspnea perception.     Follow up with Dr. Zhou in 12 months       SIGNATURE: Svetlana Cardona MD PATIENT NAME: Marcelle Hewitt   DATE/TIME: February 6, 2025 11:09 AM MRN: [id number]

## 2025-02-07 ENCOUNTER — EVALUATION (OUTPATIENT)
Dept: PHYSICAL THERAPY | Facility: CLINIC | Age: 85
End: 2025-02-07
Payer: MEDICARE

## 2025-02-07 DIAGNOSIS — M79.604 BILATERAL LOWER EXTREMITY PAIN: ICD-10-CM

## 2025-02-07 DIAGNOSIS — R29.898 WEAKNESS OF BOTH LOWER EXTREMITIES: Primary | ICD-10-CM

## 2025-02-07 DIAGNOSIS — M79.605 BILATERAL LOWER EXTREMITY PAIN: ICD-10-CM

## 2025-02-07 PROCEDURE — 97161 PT EVAL LOW COMPLEX 20 MIN: CPT | Mod: GP

## 2025-02-07 PROCEDURE — 97110 THERAPEUTIC EXERCISES: CPT | Mod: GP

## 2025-02-07 ASSESSMENT — ENCOUNTER SYMPTOMS
DEPRESSION: 1
OCCASIONAL FEELINGS OF UNSTEADINESS: 1
LOSS OF SENSATION IN FEET: 1

## 2025-02-07 NOTE — PROGRESS NOTES
Physical Therapy Evaluation    Patient Name: Marcelle Hewitt  MRN: [id number]  Today's Date: 2/7/2025  Time Calculation  Start Time: 1200  Stop Time: 1240  Time Calculation (min): 40 min  PT Evaluation Time Entry  PT Evaluation (Low) Time Entry: 20  PT Therapeutic Procedures Time Entry  Therapeutic Exercise Time Entry: 15        Insurance: [employer]  Plan of Care: 2/7/25 to 5/8/25  Visit #1    Referring MD Christopher D'Amico    Assessment     Marcelle Hewitt is a 84 y.o. referred for bilateral leg weakness. Patient demonstrates BLE weakness, decreased balance, altered gait mechanics, and frequent falls/increased fall risk. At this time, patient is limited with steps, negotiating curbs, getting in and out of car, getting in and out of tub. Patient will benefit from physical therapy services to address stated impairments and improve functional mobility.    Plan  Treatment/Interventions: Cryotherapy, Education/ Instruction, Gait training, Hot pack, Manual therapy, Neuromuscular re-education, Self care/ home management, Therapeutic activities, Therapeutic exercises  PT Plan: Skilled PT  PT Frequency: 1 time per week  Duration: 10 visits  Onset Date: 01/01/24  Certification Period Start Date: 02/07/25  Certification Period End Date: 05/08/25  Number of Treatments Authorized: auth needed  Rehab Potential: Good  Plan of Care Agreement: Patient    Primary diagnosis: Weakness of both lower extremities  Current Problem  1. Weakness of both lower extremities  Referral to Physical Therapy      2. Bilateral lower extremity pain  Referral to Physical Therapy          General:  General  Reason for Referral: Leg weakness  Referred By: Christopher D'Amico  Past Medical History Relevant to Rehab: Fell and broke wrist last year, hx of fractures in LLE  Preferred Learning Style: visual, verbal, written  Precautions:  Precautions  STEADI Fall Risk Score (The score of 4 or more indicates an increased risk of falling):  13  Precautions Comment: Diabetes, heart disease, HBP, asthma, osteoporosis  Vital Signs:       Subjective:  Chief complaints: Trouble picking legs up, not pain really  Onset/Surgery Date: Several years ago  Mechanism of Injury: Insidious onset  Previous History: Came to PT previously for knee pain, weakness  Personal Factors that may impact care: Diabetes, heart disease, osteoporosis     Pain: No pain complaints   Aggravators: curbs, stairs, lifting legs, getting in and out of the shower, getting in and out of the car   Alleviators: rest   Numbness/tingling? Yes in feet    Function:   Work/Recreation: Retired   Prior Level: Cane at baseline   Current limitations: stairs, curbs, getting in and out of tub, getting in and out of car   Condition: Worsening     Home Situation: house   Stairs: to basement, doesn't need to go down there   Lives with    No concerns about home set up    Any falls in the past year October, misstep and fell     Injuries? Broke L wrist    Fear of falling? Yes    Sleep:    Getting to sleep No   Disturbed No     Goals for Therapy:    Strengthen    Objective    Strength R / L :    Hip Flexion:     4 / 4    Hip Extension:     4 / 4    Hip Abduction:    4- / 4-    Hip Adduction:    4 / 4    Hip ER:                 4 / 4+    Hip IR:                  5 / 5    Knee Extension:   4+ / 4    Knee Flexion:       4 / 4    Ankle DF:             4 / 4-    Ankle PF:              5 / 5     Neurological: Intact in majority of BLEs, just numbness in B feet.      Gait: Amb with cane in R hand, slight forward flexed trunk posture, decreased L stance time     Outcome Measure:    LEFS : 24 / 80    14.29 seconds 5XSTS  TUG 17.75 with cane; 15.09 without    Treatment:  Therapeutic Exercise   STS x 5    Seated toe raises 3 sec x 10   LAQ 5 sec x 10 ea ea   Seated march 5 sec x 10 ea    Goals:  Active       PT Problem       Pt will be able to negotiate a 4 inch step with use of cane to indicate improving LE  strength and balance.        Start:  25    Expected End:  25            Pt will be able to perform 5XSTS without Ues to indicate improving strength and balance.        Start:  25    Expected End:  25            The patient will improve score on LEFS by 10 points to indicate decreased pain and increased functional level.         Start:  25    Expected End:  25            Pt will increase BLE strength to 4+/5 in weakened muscle groups to facilitate improved ability to negotiate steps and curbs.        Start:  25    Expected End:  25                Insurance Authorization Information  Date of Evaluation: 25    Onset Date: 2024    Referring Physician: D'Amico,Christopher     Surgery in the Last 3 months:  no    CPT Codes: Therapeutic Exercise: 83155, Neuromuscular Re-Education: 01493, Manual Therapy: 46503, Gait Trainin, and Self-Care/Home Management Trainin    Diagnosis:   Problem List Items Addressed This Visit    None  Visit Diagnoses         Codes    Weakness of both lower extremities     R29.898    Bilateral lower extremity pain     M79.604, M79.605               Functional Outcome:  Other Measures  Lower Extremity Funtional Score (LEFS): 24    OT / PT Evaluation complexity:  low    Which of the following best describes the primary reason of therapy: Improving, restoring, or adapting functional mobility or skills    Visits Requested: 10    Date Range: 90 days    Select all conditions that apply: Diabetes Mellitus and Arthritis Conditions     Lynn Gallegos, PT

## 2025-02-26 DIAGNOSIS — I48.0 PAROXYSMAL ATRIAL FIBRILLATION (MULTI): ICD-10-CM

## 2025-02-26 RX ORDER — METOPROLOL TARTRATE 25 MG/1
25 TABLET, FILM COATED ORAL 2 TIMES DAILY
Qty: 180 TABLET | Refills: 3 | Status: SHIPPED | OUTPATIENT
Start: 2025-02-26 | End: 2026-02-26

## 2025-02-26 RX ORDER — FLECAINIDE ACETATE 50 MG/1
50 TABLET ORAL 2 TIMES DAILY
Qty: 180 TABLET | Refills: 3 | Status: SHIPPED | OUTPATIENT
Start: 2025-02-26 | End: 2026-02-26

## 2025-03-03 ENCOUNTER — TELEPHONE (OUTPATIENT)
Dept: PRIMARY CARE | Facility: CLINIC | Age: 85
End: 2025-03-03
Payer: MEDICARE

## 2025-03-03 ENCOUNTER — HOSPITAL ENCOUNTER (OUTPATIENT)
Dept: CARDIOLOGY | Facility: CLINIC | Age: 85
Discharge: HOME | End: 2025-03-03
Payer: MEDICARE

## 2025-03-03 DIAGNOSIS — I48.0 PAROXYSMAL ATRIAL FIBRILLATION (MULTI): ICD-10-CM

## 2025-03-03 DIAGNOSIS — I44.2 ATRIOVENTRICULAR BLOCK, COMPLETE (MULTI): ICD-10-CM

## 2025-03-03 DIAGNOSIS — Z95.0 PRESENCE OF CARDIAC PACEMAKER: ICD-10-CM

## 2025-03-03 NOTE — TELEPHONE ENCOUNTER
Patient has a Prolia shot due at the end of March and she would like you to order her labs to have her calcium checked prior

## 2025-03-10 ENCOUNTER — HOSPITAL ENCOUNTER (OUTPATIENT)
Dept: CARDIOLOGY | Facility: CLINIC | Age: 85
Discharge: HOME | End: 2025-03-10
Payer: MEDICARE

## 2025-03-10 DIAGNOSIS — I48.0 PAROXYSMAL ATRIAL FIBRILLATION (MULTI): ICD-10-CM

## 2025-03-10 DIAGNOSIS — I44.2 ATRIOVENTRICULAR BLOCK, COMPLETE (MULTI): ICD-10-CM

## 2025-03-10 DIAGNOSIS — Z95.0 PRESENCE OF CARDIAC PACEMAKER: ICD-10-CM

## 2025-03-10 PROCEDURE — 93296 REM INTERROG EVL PM/IDS: CPT

## 2025-03-12 ENCOUNTER — TELEPHONE (OUTPATIENT)
Dept: ORTHOPEDIC SURGERY | Facility: HOSPITAL | Age: 85
End: 2025-03-12

## 2025-03-12 ENCOUNTER — HOSPITAL ENCOUNTER (OUTPATIENT)
Dept: RADIOLOGY | Facility: HOSPITAL | Age: 85
Discharge: HOME | End: 2025-03-12
Payer: MEDICARE

## 2025-03-12 ENCOUNTER — LAB (OUTPATIENT)
Dept: LAB | Facility: HOSPITAL | Age: 85
End: 2025-03-12
Payer: MEDICARE

## 2025-03-12 ENCOUNTER — OFFICE VISIT (OUTPATIENT)
Dept: ORTHOPEDIC SURGERY | Facility: HOSPITAL | Age: 85
End: 2025-03-12
Payer: MEDICARE

## 2025-03-12 VITALS — HEIGHT: 60 IN | WEIGHT: 190 LBS | BODY MASS INDEX: 37.3 KG/M2

## 2025-03-12 DIAGNOSIS — M81.0 OSTEOPOROSIS, UNSPECIFIED OSTEOPOROSIS TYPE, UNSPECIFIED PATHOLOGICAL FRACTURE PRESENCE: ICD-10-CM

## 2025-03-12 DIAGNOSIS — S69.92XS WRIST INJURY, LEFT, SEQUELA: ICD-10-CM

## 2025-03-12 DIAGNOSIS — M81.0 AGE-RELATED OSTEOPOROSIS WITHOUT CURRENT PATHOLOGICAL FRACTURE: Primary | ICD-10-CM

## 2025-03-12 LAB
ALBUMIN SERPL BCP-MCNC: 4.2 G/DL (ref 3.4–5)
ALP SERPL-CCNC: 69 U/L (ref 33–136)
ALT SERPL W P-5'-P-CCNC: 14 U/L (ref 7–45)
ANION GAP SERPL CALC-SCNC: 16 MMOL/L (ref 10–20)
AST SERPL W P-5'-P-CCNC: 17 U/L (ref 9–39)
BILIRUB SERPL-MCNC: 0.4 MG/DL (ref 0–1.2)
BUN SERPL-MCNC: 19 MG/DL (ref 6–23)
CA-I BLD-SCNC: 1.18 MMOL/L (ref 1.1–1.33)
CALCIUM SERPL-MCNC: 9.4 MG/DL (ref 8.6–10.6)
CHLORIDE SERPL-SCNC: 107 MMOL/L (ref 98–107)
CO2 SERPL-SCNC: 25 MMOL/L (ref 21–32)
CREAT SERPL-MCNC: 1.15 MG/DL (ref 0.5–1.05)
EGFRCR SERPLBLD CKD-EPI 2021: 46 ML/MIN/1.73M*2
GLUCOSE SERPL-MCNC: 110 MG/DL (ref 74–99)
POTASSIUM SERPL-SCNC: 4.8 MMOL/L (ref 3.5–5.3)
PROT SERPL-MCNC: 6.5 G/DL (ref 6.4–8.2)
SODIUM SERPL-SCNC: 143 MMOL/L (ref 136–145)

## 2025-03-12 PROCEDURE — 73110 X-RAY EXAM OF WRIST: CPT | Mod: LT

## 2025-03-12 PROCEDURE — 80053 COMPREHEN METABOLIC PANEL: CPT

## 2025-03-12 PROCEDURE — 73110 X-RAY EXAM OF WRIST: CPT | Mod: LEFT SIDE | Performed by: RADIOLOGY

## 2025-03-12 PROCEDURE — 82330 ASSAY OF CALCIUM: CPT

## 2025-03-12 PROCEDURE — 99214 OFFICE O/P EST MOD 30 MIN: CPT | Performed by: STUDENT IN AN ORGANIZED HEALTH CARE EDUCATION/TRAINING PROGRAM

## 2025-03-12 ASSESSMENT — PAIN DESCRIPTION - DESCRIPTORS: DESCRIPTORS: ACHING;SHARP

## 2025-03-12 ASSESSMENT — PAIN - FUNCTIONAL ASSESSMENT: PAIN_FUNCTIONAL_ASSESSMENT: 0-10

## 2025-03-12 ASSESSMENT — PAIN SCALES - GENERAL: PAINLEVEL_OUTOF10: 4

## 2025-03-12 NOTE — PROGRESS NOTES
CHIEF COMPLAINT: L wrist injury  DOI:10/20/24  DOS:none      HISTORY OF PRESENT ILLNESS    This is a very pleasant 84-year-old right-hand-dominant female, retired, uses a cane on a daily basis for balance issues, typically holds her right hand, non-smoker positive diabetes on Jardiance, and Eliquis for A-fib, denies any past surgical history left upper extremity denies any other significant medical history.  She is presenting as a new patient evaluation after sustaining mechanical ground-level fall with a FOOSH type mechanism on the above date.  She was assessed in the ED found to have a distal radius fracture and was placed sugar-tong splint.  She reports to be doing okay.  Pain is improving however she is very uncomfortable in her current splint.  Denies any new numbness tingling or paresthesias.    Interval update 11/6/2024:  Patient returns for scheduled follow-up visit.  She has been compliant with cast care and nonweightbearing status.  She reports she is doing well.  Occasionally she will have a twinge of pain from under the cast when she goes to move her hand.  Denies any significant numbness tingling or paresthesias.  She is happy with the cast although she is unable to admit, we discussed that we will be able to switch that at the next visit assuming her x-rays remain stable.    Interval update 11/13/2024:  Patient returns for scheduled follow-up visit.  She has been compliant with cast care and nonweightbearing status.  She reports she is doing well.  She is working on her digital range of motion.  Denies any significant pain.  Denies any numbness tingling or paresthesias.    Interval update 12/11/2024:  Patient return for scheduled follow-up visit.  She has been compliant with cast care and nonweightbearing status.  She reports doing well.  She denies any numbness tingling or paresthesias.  She has been working well on her digital motion.    Interval update 3/12/2025:  Patient return for scheduled  follow-up visit.  She reports to be doing well.  She is essentially back to normal with her left wrist.  She has an occasional twinge of pain especially with lifting something heavy or rotational movement.  She denies any numbness tingling paresthesias.    PHYSICAL EXAM    Extremities / Musculoskeletal:                  L wrist:    No skin issues no wounds.  No erythema edema or ecchymoses.  Nontender palpation throughout the wrist.  EPL intact FPL intact FDS and FDP to all fingers intact.  Full composite digital flexion.  Active wrist motion already with approximately 70 degrees of wrist extension 60 degrees of wrist flexion.  9090 pronosupination motor intact radial ulnar median AIN PIN.  Sensation tact light touch radial ulnar median.  2+ radial warm well-perfused      IMAGING / LABS / EMGs:    L wrist XR series from 10/20/24 independently reviewed demonstrating a shortened extra-articular distal radius fracture, ulnar styloid fracture    Updated left wrist x-ray series obtained on 11/6/2024 independently reviewed demonstrate stable appearance of distal radius fracture.    Updated left wrist x-ray series obtained on 11/13/2022 4 independently reviewed demonstrating stable appearance of distal radius fracture.  No change in alignment.  Interval signs of subtle fracture healing.    Updated left wrist x-ray series obtained on 12/11/2024 independently reviewed demonstrate stable appearance of distal radius fracture.  Robust interval bony callus formation.    Updated left wrist x-ray series obtained on 3/12/2025 independently reviewed demonstrating stable appearance of distal radius fracture with robust osseous union.  DRUJ and radiocarpal joints appear well reduced    ASSESSMENT:   Left moderately displaced extra-articular distal radius fracture with associated ulnar styloid fracture    We discussed the diagnosis as well as the available treatment options with her associated risks and benefits.  While her distal  radius fracture is displaced, given her age and functional status this is certainly within acceptable limits to trial nonoperative treatment.  The patient agrees with this recommendation.  Transition to a well-padded short arm cast today and will follow this fracture closely for the first 2 weeks to make sure that does not displace further.  I intend on establishing a longitudinal relationship with this patient until her left wrist injury is fully resolved    Interval update 11/6/2024:  Fractures been stable in the past week and a short arm cast.  We will continue to monitor this to make sure that it does not displace we can appropriately continue with nonoperative management.    Interval update 11/13/2024:  Fracture continues to be stable with cast care.  We will do a cast change today to allow a better fitting cast to hopefullygive her the flexibility to return to knitting    Interval update 12/11/2024:  Patient doing excellent.  We have completed formal immobilization.  Will now transition to removable cock up wrist brace to be worn while performing high demand activities and while out in public.  Patient elected to perform gentle range of motion exercises at home as opposed to any formal occupational therapy which I think is fine given how good her range of motion is already.    Interval update 3/12/2025:  Patient is doing excellent.  At this point no restrictions tenderness desired activities.      Follow-up in: Although we do not need a scheduled follow-up visit at this time, I encouraged the patient to return to clinic if they have any concerns or issues whatsoever. The patient was provided with my office's contact information.     XR at next visit: Left wrist x-ray series    Cal Ness M.D.    Department of Orthopaedics  Hand/Upper Extremity  Phone: [phone]  Appt. Phone: [phone]

## 2025-03-12 NOTE — TELEPHONE ENCOUNTER
Copied from CRM #[id number]. Topic: Information Request - Doctor, Hospital, or Provider  >> Mar 12, 2025  9:24 AM Mariana BARRAZA wrote:  Patient trying to reach office for dr. Cal bee regarding clinical concerns for her upcoming visit today 03/12/25 at 11:30, tried calling office no phone # listed.  If someone from staff can reach out with an update asap. Thank you

## 2025-03-13 ENCOUNTER — TELEPHONE (OUTPATIENT)
Dept: PRIMARY CARE | Facility: CLINIC | Age: 85
End: 2025-03-13
Payer: MEDICARE

## 2025-03-13 LAB
APPEARANCE UR: ABNORMAL
BACTERIA #/AREA URNS HPF: ABNORMAL /HPF
BACTERIA UR CULT: ABNORMAL
BACTERIA UR CULT: ABNORMAL
BILIRUB UR QL STRIP: NEGATIVE
COLOR UR: YELLOW
GLUCOSE UR QL STRIP: ABNORMAL
HGB UR QL STRIP: ABNORMAL
HYALINE CASTS #/AREA URNS LPF: ABNORMAL /LPF
KETONES UR QL STRIP: NEGATIVE
LEUKOCYTE ESTERASE UR QL STRIP: ABNORMAL
NITRITE UR QL STRIP: NEGATIVE
PH UR STRIP: 6 [PH] (ref 5–8)
PROT UR QL STRIP: ABNORMAL
RBC #/AREA URNS HPF: ABNORMAL /HPF
SERVICE CMNT-IMP: ABNORMAL
SP GR UR STRIP: 1.02 (ref 1–1.03)
SQUAMOUS #/AREA URNS HPF: ABNORMAL /HPF
WBC #/AREA URNS HPF: ABNORMAL /HPF
YEAST #/AREA URNS HPF: ABNORMAL /HPF

## 2025-03-13 NOTE — RESULT ENCOUNTER NOTE
Moderate kidney dysfunction, mostly stable and midline with previous labs, not significant for age.  Remaining labs unremarkable.

## 2025-03-13 NOTE — TELEPHONE ENCOUNTER
Result Communication    Resulted Orders   Comprehensive metabolic panel   Result Value Ref Range    Glucose 110 (H) 74 - 99 mg/dL    Sodium 143 136 - 145 mmol/L    Potassium 4.8 3.5 - 5.3 mmol/L    Chloride 107 98 - 107 mmol/L    Bicarbonate 25 21 - 32 mmol/L    Anion Gap 16 10 - 20 mmol/L    Urea Nitrogen 19 6 - 23 mg/dL    Creatinine 1.15 (H) 0.50 - 1.05 mg/dL    eGFR 46 (L) >60 mL/min/1.73m*2      Comment:      Calculations of estimated GFR are performed using the 2021 CKD-EPI Study Refit equation without the race variable for the IDMS-Traceable creatinine methods.  https://jasn.asnjournals.org/content/early/2021/09/22/ASN.1600454855    Calcium 9.4 8.6 - 10.6 mg/dL    Albumin 4.2 3.4 - 5.0 g/dL    Alkaline Phosphatase 69 33 - 136 U/L    Total Protein 6.5 6.4 - 8.2 g/dL    AST 17 9 - 39 U/L    Bilirubin, Total 0.4 0.0 - 1.2 mg/dL    ALT 14 7 - 45 U/L      Comment:      Patients treated with Sulfasalazine may generate falsely decreased results for ALT.   Calcium, ionized   Result Value Ref Range    POCT Calcium, Ionized 1.18 1.1 - 1.33 mmol/L      Comment:      The performance characteristics of ionized calcium tested  in heparinized plasma or serum have been validated by the  individual  laboratory site where testing is performed.   Testing on heparinized plasma or serum is not approved by   the FDA; however, such approval is not necessary.       1:50 PM      Results were not successfully communicated with the patient and they did not acknowledge their understanding.

## 2025-03-13 NOTE — TELEPHONE ENCOUNTER
----- Message from Christopher D'Amico sent at 3/13/2025  9:12 AM EDT -----  Moderate kidney dysfunction, mostly stable and midline with previous labs, not significant for age.  Remaining labs unremarkable.

## 2025-03-19 ENCOUNTER — TELEPHONE (OUTPATIENT)
Dept: PRIMARY CARE | Facility: CLINIC | Age: 85
End: 2025-03-19
Payer: MEDICARE

## 2025-03-20 NOTE — PROGRESS NOTES
History Of Present Illness  Marcelle Hewitt is a 84 y.o. female with CKD presenting with recurrent UTI despite vaginal estrogen in the context of menopause and diabetes, although diabetes overall well controlled. Her cardiologist has said she must stay on Jardiance and cannot switch medications. She feels like she has a UTI currently with dysuria and urinary frequency. She does not currently have a nephrologist. Using vaginal estrogen as monotherapy prevention. This is first UTI since January.    Positive urine cx: 11/14/24  Indeterminate urine cx: 3/12/25 but urine dip suspicious, also suspicious Oct 2024    Renal function: GFR 46 3/12/25  A1C: 6.1 July 2024  Renal stone hx: no  PVR today: 33  Allergies: nausea with keflex and macrobid, hives with sulfa abx    Dr. Sainz pt; here while he is on sabbatical       Past Medical History  She has a past medical history of Abnormal finding of blood chemistry, unspecified (07/08/2022), Anesthesia of skin (11/12/2020), Bilateral primary osteoarthritis of hip (03/01/2021), Dermatochalasis of unspecified eye, unspecified eyelid (05/06/2019), Dry eye syndrome of bilateral lacrimal glands (03/10/2022), Dry eye syndrome of unspecified lacrimal gland (04/29/2016), Encounter for prophylactic measures, unspecified (11/12/2020), Encounter for screening mammogram for malignant neoplasm of breast, Headache, unspecified (07/02/2021), History of falling (07/12/2017), Keratoconjunctivitis sicca, not specified as Sjogren's, bilateral (03/10/2022), Meibomian gland dysfunction left eye, upper and lower eyelids (03/10/2022), Meibomian gland dysfunction right eye, upper and lower eyelids (03/10/2022), Menopausal and female climacteric states (04/29/2016), Morbid (severe) obesity due to excess calories (Multi) (07/08/2022), Myopia, bilateral (03/10/2022), Myopia, right eye (03/10/2022), Nonexudative age-related macular degeneration, bilateral, early dry stage (05/06/2019), Obesity,  unspecified (12/02/2020), Obstructive sleep apnea (adult) (pediatric) (09/21/2017), Other abnormalities of breathing (12/20/2021), Other conditions influencing health status, Other conditions influencing health status, Other fracture of upper and lower end of left fibula, subsequent encounter for closed fracture with routine healing (06/04/2019), Other headache syndrome (07/13/2016), Other secondary cataract, unspecified eye, Other specified abnormal findings of blood chemistry (04/08/2020), Other symptoms and signs involving the musculoskeletal system (09/08/2021), Other vitreous opacities, unspecified eye, Pain in leg, unspecified (11/13/2019), Pain in unspecified limb (09/14/2017), Personal history of diseases of the skin and subcutaneous tissue (04/11/2019), Personal history of other diseases of the musculoskeletal system and connective tissue (07/25/2017), Personal history of other diseases of the musculoskeletal system and connective tissue (07/12/2017), Personal history of other diseases of the musculoskeletal system and connective tissue (06/27/2019), Personal history of other diseases of the musculoskeletal system and connective tissue (03/02/2021), Personal history of other diseases of the nervous system and sense organs (03/10/2022), Personal history of other diseases of the nervous system and sense organs (11/16/2017), Personal history of other diseases of the nervous system and sense organs (04/17/2019), Personal history of other drug therapy (10/06/2021), Personal history of other medical treatment (02/22/2021), Personal history of other specified conditions (10/06/2021), Personal history of other specified conditions (02/15/2022), Personal history of other specified conditions (12/08/2020), Personal history of other specified conditions (12/19/2017), Personal history of other specified conditions (04/03/2018), Personal history of other specified conditions (07/08/2022), Personal history of other  specified conditions (04/12/2019), Personal history of urinary (tract) infections (07/15/2021), Polyosteoarthritis, unspecified (11/12/2020), Presence of intraocular lens, Presence of intraocular lens (03/10/2022), Presence of spectacles and contact lenses, Shortness of breath (06/30/2022), Spinal stenosis, lumbar region without neurogenic claudication (02/15/2022), Unilateral primary osteoarthritis, left knee (05/07/2020), Unspecified disorder of refraction (03/10/2022), Unspecified epiphora, unspecified side, and Urinary tract infection, site not specified (12/19/2017).    Surgical History  She has a past surgical history that includes Other surgical history (06/04/2019); Cardiac pacemaker placement (09/17/2021); and Cataract extraction (11/15/2013).     Social History  She reports that she has never smoked. She has never used smokeless tobacco. She reports that she does not drink alcohol and does not use drugs.    Family History  Family History   Problem Relation Name Age of Onset    Heart disease Mother      Sleep apnea Mother      Heart disease Father      Sleep apnea Father      Breast cancer Sister      Diabetes Sister      Hypertension Sister      Multiple sclerosis Sister      Strabismus Child          Allergies  Cephalexin, Macrobid [nitrofurantoin monohyd/m-cryst], Niacin, Pregabalin, and Sulfa (sulfonamide antibiotics)    Review of Systems   Constitutional:  Negative for fever.   All other systems reviewed and are negative.       Physical Exam  Con: awake, alert, NAD  HEENT: normocephalic, speech normal  CV: no peripheral edema  Resp: no increased work of breathing  Neuro: normal mentation  Psych: mood normal  Skin: no rash         Last Recorded Vitals  Temperature 36.1 °C (96.9 °F), height 1.524 m (5'), weight 86.2 kg (190 lb).    Relevant Results    Results for orders placed or performed in visit on 03/21/25 (from the past 24 hours)   POCT UA Automated manually resulted   Result Value Ref Range    POC  Color, Urine Light-Yellow Straw, Yellow, Light-Yellow    POC Appearance, Urine Cloudy (A) Clear    POC Glucose, Urine >=1000 (4+) (A) NEGATIVE mg/dl    POC Bilirubin, Urine NEGATIVE NEGATIVE    POC Ketones, Urine NEGATIVE NEGATIVE mg/dl    POC Specific Gravity, Urine 1.015 1.005 - 1.035    POC Blood, Urine SMALL (1+) (A) NEGATIVE    POC PH, Urine 5.5 No Reference Range Established PH    POC Protein, Urine 30 (1+) (A) NEGATIVE mg/dl    POC Urobilinogen, Urine 0.2 0.2, 1.0 EU/DL    Poc Nitrite, Urine POSITIVE (A) NEGATIVE    POC Leukocytes, Urine MODERATE (2+) (A) NEGATIVE     *Note: Due to a large number of results and/or encounters for the requested time period, some results have not been displayed. A complete set of results can be found in Results Review.        Latest Reference Range & Units 07/08/20 09:00 11/02/20 09:11 02/23/21 08:13 08/11/21 13:45 01/10/22 11:52 02/03/23 11:00 07/19/23 09:29 01/09/24 10:47 07/09/24 08:24   Hemoglobin A1C see below % 6.6 6.4 6.0 6.8 6.8 ! 6.0 ! 6.1 ! 6.2 (H) 6.1 (H)        Assessment/Plan     Recurrent UTI:  UA concerning for UTI today. Urine cx ordered and empiric tx with trimethoprim  PVR 33; good bladder emptying  Vaginal estrogen and Hiprex for prophylaxis. Ok for Hiprex as long as GFR remains above 30. Referred to nephrology to monitor CKD  Daily abx if additional UTIs on this regimen  Standing cultures  Continue good diabetic control    Follow up in 3 months    Latoya Mcfarland MD, Gynecologist  Female [facility] Sexual [facility] Fellow  Dept of Urology/OBGYN  3/21/2025

## 2025-03-21 ENCOUNTER — OFFICE VISIT (OUTPATIENT)
Dept: UROLOGY | Facility: CLINIC | Age: 85
End: 2025-03-21
Payer: MEDICARE

## 2025-03-21 VITALS — BODY MASS INDEX: 37.3 KG/M2 | WEIGHT: 190 LBS | TEMPERATURE: 96.9 F | HEIGHT: 60 IN

## 2025-03-21 DIAGNOSIS — N18.31 STAGE 3A CHRONIC KIDNEY DISEASE (MULTI): ICD-10-CM

## 2025-03-21 DIAGNOSIS — N39.0 CHRONIC UTI: Primary | ICD-10-CM

## 2025-03-21 LAB
POC APPEARANCE, URINE: ABNORMAL
POC BILIRUBIN, URINE: NEGATIVE
POC BLOOD, URINE: ABNORMAL
POC COLOR, URINE: ABNORMAL
POC GLUCOSE, URINE: ABNORMAL MG/DL
POC KETONES, URINE: NEGATIVE MG/DL
POC LEUKOCYTES, URINE: ABNORMAL
POC NITRITE,URINE: POSITIVE
POC PH, URINE: 5.5 PH
POC PROTEIN, URINE: ABNORMAL MG/DL
POC SPECIFIC GRAVITY, URINE: 1.01
POC UROBILINOGEN, URINE: 0.2 EU/DL

## 2025-03-21 PROCEDURE — 1036F TOBACCO NON-USER: CPT | Performed by: STUDENT IN AN ORGANIZED HEALTH CARE EDUCATION/TRAINING PROGRAM

## 2025-03-21 PROCEDURE — 81003 URINALYSIS AUTO W/O SCOPE: CPT | Performed by: STUDENT IN AN ORGANIZED HEALTH CARE EDUCATION/TRAINING PROGRAM

## 2025-03-21 PROCEDURE — G2211 COMPLEX E/M VISIT ADD ON: HCPCS | Performed by: STUDENT IN AN ORGANIZED HEALTH CARE EDUCATION/TRAINING PROGRAM

## 2025-03-21 PROCEDURE — 1159F MED LIST DOCD IN RCRD: CPT | Performed by: STUDENT IN AN ORGANIZED HEALTH CARE EDUCATION/TRAINING PROGRAM

## 2025-03-21 PROCEDURE — 99204 OFFICE O/P NEW MOD 45 MIN: CPT | Performed by: STUDENT IN AN ORGANIZED HEALTH CARE EDUCATION/TRAINING PROGRAM

## 2025-03-21 PROCEDURE — 1126F AMNT PAIN NOTED NONE PRSNT: CPT | Performed by: STUDENT IN AN ORGANIZED HEALTH CARE EDUCATION/TRAINING PROGRAM

## 2025-03-21 RX ORDER — METHENAMINE HIPPURATE 1000 MG/1
1 TABLET ORAL 2 TIMES DAILY
Qty: 60 TABLET | Refills: 2 | Status: SHIPPED | OUTPATIENT
Start: 2025-03-21 | End: 2025-06-19

## 2025-03-21 RX ORDER — TRIMETHOPRIM 100 MG/1
100 TABLET ORAL 2 TIMES DAILY
Qty: 6 TABLET | Refills: 0 | Status: SHIPPED | OUTPATIENT
Start: 2025-03-21 | End: 2025-03-24

## 2025-03-21 ASSESSMENT — ENCOUNTER SYMPTOMS: FEVER: 0

## 2025-03-21 ASSESSMENT — PAIN SCALES - GENERAL: PAINLEVEL_OUTOF10: 0-NO PAIN

## 2025-03-22 DIAGNOSIS — N39.0 CHRONIC UTI: ICD-10-CM

## 2025-03-23 DIAGNOSIS — N39.0 CHRONIC UTI: ICD-10-CM

## 2025-03-23 LAB — BACTERIA UR CULT: ABNORMAL

## 2025-03-24 DIAGNOSIS — N39.0 CHRONIC UTI: ICD-10-CM

## 2025-03-24 LAB — BACTERIA UR CULT: ABNORMAL

## 2025-03-25 DIAGNOSIS — N39.0 CHRONIC UTI: ICD-10-CM

## 2025-03-26 ENCOUNTER — TREATMENT (OUTPATIENT)
Dept: PHYSICAL THERAPY | Facility: CLINIC | Age: 85
End: 2025-03-26
Payer: MEDICARE

## 2025-03-26 DIAGNOSIS — M79.604 BILATERAL LOWER EXTREMITY PAIN: ICD-10-CM

## 2025-03-26 DIAGNOSIS — R29.898 WEAKNESS OF BOTH LOWER EXTREMITIES: ICD-10-CM

## 2025-03-26 DIAGNOSIS — N39.0 CHRONIC UTI: ICD-10-CM

## 2025-03-26 DIAGNOSIS — M79.605 BILATERAL LOWER EXTREMITY PAIN: ICD-10-CM

## 2025-03-26 PROCEDURE — 97110 THERAPEUTIC EXERCISES: CPT | Mod: GP

## 2025-03-26 NOTE — PROGRESS NOTES
"Physical Therapy    Physical Therapy Treatment    Patient Name: Marcelle Hewitt  MRN: [id number]  Today's Date: 3/26/2025    Time Entry:   Time Calculation  Start Time: 1430  Stop Time: 1513  Time Calculation (min): 43 min     PT Therapeutic Procedures Time Entry  Therapeutic Exercise Time Entry: 43                 APPROVED 7 VISITS, 2/7/25-4/7/25  Visit Count: 2/7    Assessment:   Most SOB after step ups, and showed some decreased eccentric control with these. Fatigued with seated march/LAQ.  Plan:   Cont LE strength, endurance    Current Problem  1. Weakness of both lower extremities  Follow Up In Physical Therapy      2. Bilateral lower extremity pain  Follow Up In Physical Therapy          Subjective    \"I get short of breath sometimes, and I want my walking and balance to get better.\"   Precautions   Diabetes, heart disease, osteoporosis   Pain   None right now    Objective   Amb with hurry cane in R hand    Treatments:  Therapeutic Exercise   Seated toe raises 3 sec x 10   LAQ 5 sec x 10 ea ea   Seated march 3 sec x 10 ea   Seated ball squeeze 5 sec x 10   Seated clam 5 sec x 10 (blue band)   STS 4 x 5    4\" step up w/BUE support x 10 ea    Lat amb x 3 laps   Nustep x 5 min        Goals:  Active       PT Problem       Pt will be able to negotiate a 4 inch step with use of cane to indicate improving LE strength and balance.        Start:  02/07/25    Expected End:  03/09/25            Pt will be able to perform 5XSTS without Ues to indicate improving strength and balance.        Start:  02/07/25    Expected End:  03/09/25            The patient will improve score on LEFS by 10 points to indicate decreased pain and increased functional level.         Start:  02/07/25    Expected End:  04/08/25            Pt will increase BLE strength to 4+/5 in weakened muscle groups to facilitate improved ability to negotiate steps and curbs.        Start:  02/07/25    Expected End:  04/08/25              "

## 2025-03-27 ENCOUNTER — APPOINTMENT (OUTPATIENT)
Dept: INFUSION THERAPY | Facility: CLINIC | Age: 85
End: 2025-03-27
Payer: MEDICARE

## 2025-03-27 VITALS
SYSTOLIC BLOOD PRESSURE: 130 MMHG | RESPIRATION RATE: 18 BRPM | OXYGEN SATURATION: 97 % | HEART RATE: 78 BPM | TEMPERATURE: 97.3 F | DIASTOLIC BLOOD PRESSURE: 62 MMHG

## 2025-03-27 DIAGNOSIS — N39.0 CHRONIC UTI: ICD-10-CM

## 2025-03-27 DIAGNOSIS — M81.0 OSTEOPOROSIS, UNSPECIFIED OSTEOPOROSIS TYPE, UNSPECIFIED PATHOLOGICAL FRACTURE PRESENCE: ICD-10-CM

## 2025-03-27 PROCEDURE — 96372 THER/PROPH/DIAG INJ SC/IM: CPT | Performed by: NURSE PRACTITIONER

## 2025-03-27 RX ORDER — ALBUTEROL SULFATE 0.83 MG/ML
3 SOLUTION RESPIRATORY (INHALATION) AS NEEDED
OUTPATIENT
Start: 2025-09-14

## 2025-03-27 RX ORDER — EPINEPHRINE 0.3 MG/.3ML
0.3 INJECTION SUBCUTANEOUS EVERY 5 MIN PRN
OUTPATIENT
Start: 2025-09-14

## 2025-03-27 RX ORDER — DIPHENHYDRAMINE HYDROCHLORIDE 50 MG/ML
50 INJECTION, SOLUTION INTRAMUSCULAR; INTRAVENOUS AS NEEDED
OUTPATIENT
Start: 2025-09-14

## 2025-03-27 RX ORDER — FAMOTIDINE 10 MG/ML
20 INJECTION, SOLUTION INTRAVENOUS ONCE AS NEEDED
OUTPATIENT
Start: 2025-09-14

## 2025-03-27 ASSESSMENT — ENCOUNTER SYMPTOMS
DIZZINESS: 0
LEG SWELLING: 0
WHEEZING: 0
WOUND: 0
LIGHT-HEADEDNESS: 0
SHORTNESS OF BREATH: 1
COUGH: 0
NUMBNESS: 0
EXTREMITY WEAKNESS: 0
PALPITATIONS: 0

## 2025-03-27 NOTE — PATIENT INSTRUCTIONS
Today :We administered denosumab. Prolia 60mg subcutaneous injection right upper arm  Blood Calcium within 28 days of next Prolia appointment    For:   1. Osteoporosis, unspecified osteoporosis type, unspecified pathological fracture presence       Your next appointment is due in:  6 months        Please read the  Medication Guide that was given to you and reviewed during todays visit.     (Tell all doctors including dentists that you are taking this medication)     Go to the emergency room or call 911 if:  -You have signs of allergic reaction:   -Rash, hives, itching.   -Swollen, blistered, peeling skin.   -Swelling of face, lips, mouth, tongue or throat.   -Tightness of chest, trouble breathing, swallowing or talking     Call your doctor:  - If injection site gets red, warm, swollen, itchy or leaks fluid or pus.     (Leave band aid on your injection site for at least 2 hours and keep area clean and dry  - If you get sick or have symptoms of infection or are not feeling well for any reason.    (Wash your hands often, stay away from people who are sick)  - If you have side effects from your medication that do not go away or are bothersome.     (Refer to the teaching your nurse gave you for side effects to call your doctor about)    - Common side effects may include:  stuffy nose, headache, feeling tired, muscle aches, upset stomach  - Before receiving any vaccines     - Call the [facility] at   If:  - You get sick, are on antibiotics, have had a recent vaccine, have surgery or dental work and your doctor wants your visit rescheduled.  - You need to cancel and reschedule your visit for any reason. Call at least 2 days before your visit if you need to cancel.   - Your insurance changes before your next visit.    (We will need to get approval from your new insurance. This can take up to two weeks.)     The [facility] is opened Monday thru Friday. We are closed on weekends and  holidays.   Voice mail will take your call if the center is closed. If you leave a message please allow 24 hours for a call back during weekdays. If you leave a message on a weekend/holiday, we will call you back the next business day.    A pharmacist is available Monday - Friday from 8:30AM to 3:30PM to help answer any questions you may have about your prescriptions(s). Please call pharmacy at:    [facility]: [phone]  [facility]: [phone]  [facility]: [phone]

## 2025-03-27 NOTE — PROGRESS NOTES
[facility]   Infusion Clinic Note   Date: 2025   Name: Marcelle Hewitt  : 1940   MRN: [id number]         Reason for Visit: Injections (Every 6 months Prolia 60mg subcutaneous injection)         Today: We administered denosumab.       Ordered By: D'Amico, Christopher, DO       For a Diagnosis of: Osteoporosis, unspecified osteoporosis type, unspecified pathological fracture presence       At today's visit patient accompanied by: Self      Today's Vitals:   Vitals:    25 1114   BP: 130/62   Pulse: 78   Resp: 18   Temp: 36.3 °C (97.3 °F)   TempSrc: Temporal   SpO2: 97%             Pre - Treatment Checklist:      - Previous reaction to current treatment: no      (Assess patient for the concerns below. Document provider notification as appropriate).  - Active or recent infection with/without current antibiotic use: Completed ATB for UTI, no further c/o or s/s  - Recent or planned invasive dental work: no  - Recent or planned surgeries: no  - Recently received or plans to receive vaccinations: no  - Has treatment related toxicities: no  - Any chance may be pregnant:  no      Pain: 6   - Is the pain different from normal: no   - Is prescribing Doctor aware:  yes  6/10, chronic back      Labs: Reviewed       Fall Risk Screening: Davis Fall Risk  History of Falling, Immediate or Within 3 Months: Yes (3-2025, in kitchen, lost balance, no injuries)  Ambulatory Aid: Crutches/cane/walker (Using quad ane)  Intravenous Therapy/Heparin Lock: No  Gait/Transferring: Impaired   Mental Status: Oriented to own ability       Review Of Systems:  Review of Systems   Respiratory:  Positive for shortness of breath. Negative for cough and wheezing.         H/O sleep apnea, wears c-pap at hs  Chronic, SOB, gentry on exertion   Cardiovascular:  Negative for chest pain, leg swelling and palpitations.        H/O pacemaker  Occasional leg swelling, takes diuretic prn   Skin:  Negative for itching,  rash and wound.   Neurological:  Negative for dizziness, extremity weakness, light-headedness and numbness.        Occasional lightheadedness         Infusion Readiness:  - Assessment Concerns Related to Infusion: No  - Provider notified: n/a      New Patient Education:    N/A (returning patient for continuation of therapy. Ongoing education provided as needed.)        Treatment Conditions & Drug Specific Questions:    Denosumab  (PROLIA. XGEVA)    (Unless otherwise specified on patient specific therapy plan):     TREATMENT CONDITIONS:  Unless otherwise specified on patient specific therapy plan HOLD and notify provider prior to proceeding with today's injection if patients:  O Corrected or Serum Calcium LESS THAN 8.6 mg/dL  OR Ionized calcium less than 1.1 mmol/L or  less than 4.7 mg/dL (depending on resulting agency)  o Recent or planned invasive dental procedure (within 4 weeks)    Lab Results   Component Value Date    CALCIUM 9.4 03/12/2025    PHOS 3.8 02/15/2024      Lab Results   Component Value Date    CAION 1.18 03/12/2025     Patient meets treatment conditions? Yes    DRUG SPECIFIC QUESTIONS:  Is the patient taking calcium and vitamin D? Yes  (Recommended)    Pt Instructed on following risks: (1) hypocalcemia, (2) osteonecrosis of the jaw, (3) atypical femoral fractures, (4) serious infections, and (5) dermatologic reactions?  Yes      REMINDER:  PREGNANCY CATEGORY X DRUG. OBTAIN NEGTATIVE PREGNANCY TEST PRIOR TO FIRST INFUSION FOR WOMEN OF CHILDBEARING ABILITY   REMS DRUG    Recommended Vitals/Observation:  Vitals: Obtain vitals prior to injection.  Observation: Patient may leave immediately following injection.        Weight Based Drug Calculations:    WEIGHT BASED DRUGS: NOT APPLICABLE / FLAT DOSE       Post Treatment: Patient tolerated treatment without issue and was discharged in no apparent distress.      Note Authored / Patient Cared for By: Ankita Spencer LPN

## 2025-03-28 DIAGNOSIS — N39.0 CHRONIC UTI: ICD-10-CM

## 2025-03-28 LAB — BACTERIA UR CULT: NORMAL

## 2025-04-02 ENCOUNTER — TREATMENT (OUTPATIENT)
Dept: PHYSICAL THERAPY | Facility: CLINIC | Age: 85
End: 2025-04-02
Payer: MEDICARE

## 2025-04-02 DIAGNOSIS — R29.898 WEAKNESS OF BOTH LOWER EXTREMITIES: ICD-10-CM

## 2025-04-02 DIAGNOSIS — M79.604 BILATERAL LOWER EXTREMITY PAIN: ICD-10-CM

## 2025-04-02 DIAGNOSIS — M79.605 BILATERAL LOWER EXTREMITY PAIN: ICD-10-CM

## 2025-04-02 PROCEDURE — 97110 THERAPEUTIC EXERCISES: CPT | Mod: GP

## 2025-04-02 NOTE — PROGRESS NOTES
"Physical Therapy    Physical Therapy Treatment    Patient Name: Marcelle Hewitt  MRN: [id number]  Today's Date: 4/2/2025    Time Entry:   Time Calculation  Start Time: 1430  Stop Time: 1510  Time Calculation (min): 40 min     PT Therapeutic Procedures Time Entry  Therapeutic Exercise Time Entry: 40                 APPROVED 7 VISITS, 2/7/25-4/7/25  Visit Count: 3/7    Assessment:   Continues to get SOB with step ups. Added reps to some of the easier seated exercises. Felt good stretch in low back with pball seated flexion.   Plan:   Add minutes to nustep, standing marches/leg kicks as able    Current Problem  1. Weakness of both lower extremities  Follow Up In Physical Therapy      2. Bilateral lower extremity pain  Follow Up In Physical Therapy          Subjective    \"I think things are getting better.\"  Precautions   Diabetes, heart disease, osteoporosis   Pain   None right now    Objective               Strength R / L :                          Hip Flexion:          4 / 4                          Hip Extension:     4 / 4                          Hip Abduction:    4- / 4-                          Hip Adduction:    4 / 4                          Hip ER:                 4 / 4+                          Hip IR:                  5 / 5                          Knee Extension:   4+ / 4                          Knee Flexion:       4 / 4                          Ankle DF:             4 / 4-                          Ankle PF:              5 / 5                 Neurological: Intact in majority of BLEs, just numbness in B feet.                  Gait: Amb with cane in R hand, slight forward flexed trunk posture, decreased L stance time                 Outcome Measure:                          LEFS : 24 / 80       Treatments:  Therapeutic Exercise   Nustep x 3 min   Seated toe raises 3 sec x 15   Seated heel raise 3 sec x 15   Seated march 3 sec 2 x 10 ea   Seated ball squeeze 5 sec x 15   LAQ 5 sec x 10 ea ea   Seated clam 5 " "sec x 15 (blue band)   Lumbar flexion w/pball x 20   STS 3 x 5, 30 sec rest between sets   4\" step up w/BUE support x 10 ea    Lat amb x 2 laps          Goals:  Active       PT Problem       Pt will be able to negotiate a 4 inch step with use of cane to indicate improving LE strength and balance.        Start:  25    Expected End:  25            Pt will be able to perform 5XSTS without Ues to indicate improving strength and balance.        Start:  25    Expected End:  25            The patient will improve score on LEFS by 10 points to indicate decreased pain and increased functional level.         Start:  25    Expected End:  25            Pt will increase BLE strength to 4+/5 in weakened muscle groups to facilitate improved ability to negotiate steps and curbs.        Start:  25    Expected End:  25              Insurance Authorization Information  Date of Evaluation: 25    Onset Date: 2024    Referring Physician: D'Amico,Christopher     Surgery in the Last 3 months:  no    CPT Codes: Therapeutic Exercise: 89885 Therapeutic Activity: 90673 Neuromuscular Re-Education: 85207 Manual Therapy: 80009 Gait Trainin Self-Care/Home Management Trainin Mechanical Traction: 15920 Electric Stimulation (Attended): 86725 Electric Stimulation (Unattended): 04849 Vasopneumatic Device: 65003 PT Re-Evaluation: 44871     Diagnosis:   Problem List Items Addressed This Visit    None  Visit Diagnoses         Codes    Weakness of both lower extremities     R29.898    Bilateral lower extremity pain     M79.604, M79.605               Functional Outcome:   LEFS 24/    OT / PT Evaluation complexity:  low    Which of the following best describes the primary reason of therapy: Improving, restoring, or adapting functional mobility or skills    Visits Requested: 10    Date Range: 90 days    Select all conditions that apply: Unknown     Re-evaluation only:  Short term goals " Met: 1/2    Long term goals Met: 0/2    Lynn Vidal, PT

## 2025-04-09 ENCOUNTER — TREATMENT (OUTPATIENT)
Dept: PHYSICAL THERAPY | Facility: CLINIC | Age: 85
End: 2025-04-09
Payer: MEDICARE

## 2025-04-09 ENCOUNTER — TELEPHONE (OUTPATIENT)
Dept: UROLOGY | Facility: CLINIC | Age: 85
End: 2025-04-09

## 2025-04-09 DIAGNOSIS — M79.604 BILATERAL LOWER EXTREMITY PAIN: ICD-10-CM

## 2025-04-09 DIAGNOSIS — R29.898 WEAKNESS OF BOTH LOWER EXTREMITIES: ICD-10-CM

## 2025-04-09 DIAGNOSIS — M79.605 BILATERAL LOWER EXTREMITY PAIN: ICD-10-CM

## 2025-04-09 PROCEDURE — 97110 THERAPEUTIC EXERCISES: CPT | Mod: GP

## 2025-04-09 NOTE — TELEPHONE ENCOUNTER
Patient called into Dr. Sainz's phone line for Dr. Mcfarland in urology about her referral to nephrology. Central scheduling number left for patient to schedule.

## 2025-04-09 NOTE — PROGRESS NOTES
"Physical Therapy    Physical Therapy Treatment    Patient Name: Marcelle Hewitt  MRN: [id number]  Today's Date: 4/9/2025    Time Entry:   Time Calculation  Start Time: 1405  Stop Time: 1451  Time Calculation (min): 46 min     PT Therapeutic Procedures Time Entry  Therapeutic Exercise Time Entry: 45                 APPROVED- 6 VISITS, 04/08/25-06/06/25  Visit Count: 1/6    Assessment:   Pt fatigued at end of session. Increased reps for seated exercises. Mild R knee discomfort at end of session.   Plan:   Add minutes to nustep, standing marches/leg kicks as able    Current Problem  1. Weakness of both lower extremities  Follow Up In Physical Therapy      2. Bilateral lower extremity pain  Follow Up In Physical Therapy          Subjective    \"R > L knee pain worse yesterday than normal, going to Dr Lamb next week.\"  Precautions   Diabetes, heart disease, osteoporosis   Pain   6-7/10 B knees    Objective   When amb without cane, forward flexed trunk and R antalgia    Treatments:  Therapeutic Exercise   Nustep x 5 min   Seated toe raises 3 sec x 20   Seated heel raise 3 sec x 20   Seated ball squeeze 5 sec x 20   LAQ 5 sec x 15 ea   Seated clam 5 sec x 20 (green band)   Seated march 3 sec 2 x 10 ea   STS 3 x 5, 30 sec rest between sets   4\" step up w/BUE support x 10 ea    Lat amb x 2 laps    Not today:   Lumbar flexion w/pball x 20          Goals:  Active       PT Problem       Pt will be able to negotiate a 4 inch step with use of cane to indicate improving LE strength and balance.  (Progressing)       Start:  02/07/25    Expected End:  03/09/25            Pt will be able to perform 5XSTS without Ues to indicate improving strength and balance.  (Met)       Start:  02/07/25    Expected End:  03/09/25    Resolved:  04/02/25         The patient will improve score on LEFS by 10 points to indicate decreased pain and increased functional level.   (Progressing)       Start:  02/07/25    Expected End:  04/08/25       "      Pt will increase BLE strength to 4+/5 in weakened muscle groups to facilitate improved ability to negotiate steps and curbs.  (Progressing)       Start:  02/07/25    Expected End:  04/08/25

## 2025-04-13 PROCEDURE — RXMED WILLOW AMBULATORY MEDICATION CHARGE

## 2025-04-14 ENCOUNTER — APPOINTMENT (OUTPATIENT)
Dept: PRIMARY CARE | Facility: CLINIC | Age: 85
End: 2025-04-14
Payer: MEDICARE

## 2025-04-14 VITALS
DIASTOLIC BLOOD PRESSURE: 77 MMHG | BODY MASS INDEX: 35.93 KG/M2 | HEART RATE: 71 BPM | SYSTOLIC BLOOD PRESSURE: 119 MMHG | WEIGHT: 183 LBS | HEIGHT: 60 IN | OXYGEN SATURATION: 96 %

## 2025-04-14 DIAGNOSIS — G89.29 CHRONIC LOW BACK PAIN, UNSPECIFIED BACK PAIN LATERALITY, UNSPECIFIED WHETHER SCIATICA PRESENT: ICD-10-CM

## 2025-04-14 DIAGNOSIS — E11.59 TYPE 2 DIABETES MELLITUS WITH OTHER CIRCULATORY COMPLICATIONS: Primary | ICD-10-CM

## 2025-04-14 DIAGNOSIS — I50.32 CHRONIC DIASTOLIC CONGESTIVE HEART FAILURE: ICD-10-CM

## 2025-04-14 DIAGNOSIS — M81.0 OSTEOPOROSIS, UNSPECIFIED OSTEOPOROSIS TYPE, UNSPECIFIED PATHOLOGICAL FRACTURE PRESENCE: ICD-10-CM

## 2025-04-14 DIAGNOSIS — M54.50 CHRONIC LOW BACK PAIN, UNSPECIFIED BACK PAIN LATERALITY, UNSPECIFIED WHETHER SCIATICA PRESENT: ICD-10-CM

## 2025-04-14 DIAGNOSIS — J45.909 ASTHMA, UNSPECIFIED ASTHMA SEVERITY, UNSPECIFIED WHETHER COMPLICATED, UNSPECIFIED WHETHER PERSISTENT (HHS-HCC): ICD-10-CM

## 2025-04-14 DIAGNOSIS — N39.0 RECURRENT UTI: ICD-10-CM

## 2025-04-14 DIAGNOSIS — I48.0 PAROXYSMAL ATRIAL FIBRILLATION (MULTI): ICD-10-CM

## 2025-04-14 DIAGNOSIS — E78.5 HYPERLIPIDEMIA, UNSPECIFIED HYPERLIPIDEMIA TYPE: ICD-10-CM

## 2025-04-14 DIAGNOSIS — N18.31 STAGE 3A CHRONIC KIDNEY DISEASE (MULTI): ICD-10-CM

## 2025-04-14 PROCEDURE — 1124F ACP DISCUSS-NO DSCNMKR DOCD: CPT | Performed by: STUDENT IN AN ORGANIZED HEALTH CARE EDUCATION/TRAINING PROGRAM

## 2025-04-14 PROCEDURE — 1160F RVW MEDS BY RX/DR IN RCRD: CPT | Performed by: STUDENT IN AN ORGANIZED HEALTH CARE EDUCATION/TRAINING PROGRAM

## 2025-04-14 PROCEDURE — 1159F MED LIST DOCD IN RCRD: CPT | Performed by: STUDENT IN AN ORGANIZED HEALTH CARE EDUCATION/TRAINING PROGRAM

## 2025-04-14 PROCEDURE — 1036F TOBACCO NON-USER: CPT | Performed by: STUDENT IN AN ORGANIZED HEALTH CARE EDUCATION/TRAINING PROGRAM

## 2025-04-14 PROCEDURE — 99214 OFFICE O/P EST MOD 30 MIN: CPT | Performed by: STUDENT IN AN ORGANIZED HEALTH CARE EDUCATION/TRAINING PROGRAM

## 2025-04-14 PROCEDURE — G2211 COMPLEX E/M VISIT ADD ON: HCPCS | Performed by: STUDENT IN AN ORGANIZED HEALTH CARE EDUCATION/TRAINING PROGRAM

## 2025-04-14 PROCEDURE — 3074F SYST BP LT 130 MM HG: CPT | Performed by: STUDENT IN AN ORGANIZED HEALTH CARE EDUCATION/TRAINING PROGRAM

## 2025-04-14 PROCEDURE — 3078F DIAST BP <80 MM HG: CPT | Performed by: STUDENT IN AN ORGANIZED HEALTH CARE EDUCATION/TRAINING PROGRAM

## 2025-04-14 ASSESSMENT — PATIENT HEALTH QUESTIONNAIRE - PHQ9
SUM OF ALL RESPONSES TO PHQ9 QUESTIONS 1 AND 2: 0
1. LITTLE INTEREST OR PLEASURE IN DOING THINGS: NOT AT ALL
1. LITTLE INTEREST OR PLEASURE IN DOING THINGS: NOT AT ALL
2. FEELING DOWN, DEPRESSED OR HOPELESS: NOT AT ALL
2. FEELING DOWN, DEPRESSED OR HOPELESS: NOT AT ALL
SUM OF ALL RESPONSES TO PHQ9 QUESTIONS 1 AND 2: 0

## 2025-04-14 ASSESSMENT — ENCOUNTER SYMPTOMS
OCCASIONAL FEELINGS OF UNSTEADINESS: 1
DEPRESSION: 0
LOSS OF SENSATION IN FEET: 0

## 2025-04-14 ASSESSMENT — COLUMBIA-SUICIDE SEVERITY RATING SCALE - C-SSRS
1. IN THE PAST MONTH, HAVE YOU WISHED YOU WERE DEAD OR WISHED YOU COULD GO TO SLEEP AND NOT WAKE UP?: NO
1. IN THE PAST MONTH, HAVE YOU WISHED YOU WERE DEAD OR WISHED YOU COULD GO TO SLEEP AND NOT WAKE UP?: NO
2. HAVE YOU ACTUALLY HAD ANY THOUGHTS OF KILLING YOURSELF?: NO
6. HAVE YOU EVER DONE ANYTHING, STARTED TO DO ANYTHING, OR PREPARED TO DO ANYTHING TO END YOUR LIFE?: NO
6. HAVE YOU EVER DONE ANYTHING, STARTED TO DO ANYTHING, OR PREPARED TO DO ANYTHING TO END YOUR LIFE?: NO
2. HAVE YOU ACTUALLY HAD ANY THOUGHTS OF KILLING YOURSELF?: NO

## 2025-04-14 NOTE — PROGRESS NOTES
84-year-old female presenting for follow-up on multiple concerns.  Relatively stable.    Chronic back pain, chronic knee pain  Is following with orthopedics for the knee, PT for the back.  Is getting some improvement with the back, but pain is quite significant, and sometimes getting weakness in the legs.  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  Asymptomatic.    DMII, CKD 3A, diabetic neuropathy  Stable on SGLT2 and ARB.  A1c generally at goal.       Asthma  Stable without any medication    Osteoporosis  Stable, on Prolia every 6 months    Recurrent UTI  Following with urology    12 point ROS reviewed and negative other than as stated in HPI      General: Alert, oriented, pleasant, in no acute distress  HEENT:      Head: normocephalic, atraumatic;      eyes: EOMI, no scleral icterus;   Neck: soft, supple, non-tender, no masses appreciated  CV: Heart with regular rate and rhythm, normal S1/S2, no murmurs  Lungs: CTAB without wheezing, rhonchi or rales; good respiratory effort, no increased work of breathing  Neuro: Cranial nerves grossly intact; alert and oriented, ambulating with cane  Psych: Appropriate mood and affect    #DMII #CKD 3 #Diabetic neuropathy  - Last A1c 6.1, 07/2024  -Albumin negative in 01/2024  -UTD eye exam, podiatry  -Continue Jardiance 25 mg qd  -Avoid nephrotoxic drugs, and stay adequately hydrated  -Repeat A1c, albumin    # A-fib #SA node dysfunction #CHF  -Sinus rhythm on auscultation, feels well, stable  -Continue metoprolol tartrate 25 mg BID, flecainide 50 mg BID, Eliquis 2.5 mg BID  -Furosemide 40 mg as needed, losartan 100 mg, Jardiance 25 mg  -Continue to follow with cardiology    #HLD  -Continue rosuvastatin 40 mg daily  - Repeat lipid panel    # Chronic back pain   -CT in 2022 showed borderline low-lying conus medullaris  within the associated filum terminale lipoma, degenerative changes  -Follow-up with pain management, recommended procedure not covered by insurance, no longer following  - Will give referral to neurosurgery for evaluation and potential treatment options  - Continue with physical therapy    #chronic knee pain  -Continue to follow with ortho    # Asthma  -Stable without any medication    #Osteoporosis  -Continue Prolia 60 mg every 6 months    # Recurrent UTI  - Continue urogynecology    F/U 6 months or sooner if indicated    Chris D'Amico, DO

## 2025-04-16 ENCOUNTER — APPOINTMENT (OUTPATIENT)
Dept: ORTHOPEDIC SURGERY | Facility: CLINIC | Age: 85
End: 2025-04-16
Payer: MEDICARE

## 2025-04-16 ENCOUNTER — PHARMACY VISIT (OUTPATIENT)
Dept: PHARMACY | Facility: CLINIC | Age: 85
End: 2025-04-16
Payer: MEDICARE

## 2025-04-16 DIAGNOSIS — M17.0 PRIMARY OSTEOARTHRITIS OF BOTH KNEES: Primary | ICD-10-CM

## 2025-04-16 PROCEDURE — 1036F TOBACCO NON-USER: CPT | Performed by: ORTHOPAEDIC SURGERY

## 2025-04-16 PROCEDURE — 1159F MED LIST DOCD IN RCRD: CPT | Performed by: ORTHOPAEDIC SURGERY

## 2025-04-16 PROCEDURE — 20610 DRAIN/INJ JOINT/BURSA W/O US: CPT | Performed by: ORTHOPAEDIC SURGERY

## 2025-04-16 RX ORDER — TRIAMCINOLONE ACETONIDE 40 MG/ML
1 INJECTION, SUSPENSION INTRA-ARTICULAR; INTRAMUSCULAR
Status: COMPLETED | OUTPATIENT
Start: 2025-04-16 | End: 2025-04-16

## 2025-04-16 RX ORDER — LIDOCAINE HYDROCHLORIDE 10 MG/ML
4 INJECTION, SOLUTION INFILTRATION; PERINEURAL
Status: COMPLETED | OUTPATIENT
Start: 2025-04-16 | End: 2025-04-16

## 2025-04-16 RX ADMIN — TRIAMCINOLONE ACETONIDE 1 ML: 40 INJECTION, SUSPENSION INTRA-ARTICULAR; INTRAMUSCULAR at 13:11

## 2025-04-16 RX ADMIN — LIDOCAINE HYDROCHLORIDE 4 ML: 10 INJECTION, SOLUTION INFILTRATION; PERINEURAL at 13:11

## 2025-04-16 NOTE — PROGRESS NOTES
L Inj/Asp: bilateral knee on 4/16/2025 1:11 PM  Indications: pain and joint swelling  Details: 22 G needle, anterolateral approach  Medications (Right): 1 mL triamcinolone acetonide 40 mg/mL; 4 mL lidocaine 10 mg/mL (1 %)  Medications (Left): 1 mL triamcinolone acetonide 40 mg/mL; 4 mL lidocaine 10 mg/mL (1 %)  Outcome: tolerated well, no immediate complications    Discussion:  I discussed the conservative treatment options for knee osteoarthritis including but not limited to physical therapy, oral NSAIDS, activity and lifestyle modification, and corticosteroid injections. Pt has elected to undergo a cortisone injection today. I have explained the risk and benefits of an injection including the possibility of joint infection, bleeding, damage to cartilage, allergic reaction. Patient verbalized understanding and gave verbal consent wishes to proceed with a intra-articular cortisone injection for their knee.    Procedure:  After discussing the risk and benefits of the procedure, we proceeded with an intra-articular bilateral knee injection.    With the patient's informed verbal consent, the bilateral knees were prepped in standard sterile fashion with Chlorhexidine. The skin was then anesthetized with ethyl chloride spray and cleaned again with Chlorhexidine. The bilateral knees were then apirated/injected with a prefilled 20-gauge syringe of 40 mg Kenalog + 4 ml Lidocaine using the lateral approach without complications.  The patient tolerated this well and felt immediate initial relief of symptoms. A bandaid was applied and the patient ambulated out of the clinic on ther own accord without difficulty. Patient was instructed to avoid physical activity for 24-48 hours to prevent the knees from swelling and may ice the knees as tolerated. Patient should contact the office if any signs of of infection appear: redness, fever, chills, drainage, swelling or warmth to the knees.  Pt understands that the injections can be  repeated no sooner than 3 months.      Procedure, treatment alternatives, risks and benefits explained, specific risks discussed. Consent was given by the patient. Immediately prior to procedure a time out was called to verify the correct patient, procedure, equipment, support staff and site/side marked as required. Patient was prepped and draped in the usual sterile fashion.

## 2025-04-18 ENCOUNTER — APPOINTMENT (OUTPATIENT)
Dept: UROLOGY | Facility: CLINIC | Age: 85
End: 2025-04-18
Payer: MEDICARE

## 2025-04-21 ENCOUNTER — TREATMENT (OUTPATIENT)
Dept: PHYSICAL THERAPY | Facility: CLINIC | Age: 85
End: 2025-04-21
Payer: MEDICARE

## 2025-04-21 DIAGNOSIS — M79.605 BILATERAL LOWER EXTREMITY PAIN: ICD-10-CM

## 2025-04-21 DIAGNOSIS — M79.604 BILATERAL LOWER EXTREMITY PAIN: ICD-10-CM

## 2025-04-21 DIAGNOSIS — R29.898 WEAKNESS OF BOTH LOWER EXTREMITIES: ICD-10-CM

## 2025-04-21 PROCEDURE — 97110 THERAPEUTIC EXERCISES: CPT | Mod: GP

## 2025-04-21 NOTE — PROGRESS NOTES
"Physical Therapy    Physical Therapy Treatment    Patient Name: Marcelle Hewitt  MRN: [id number]  Today's Date: 4/21/2025    Time Entry:   Time Calculation  Start Time: 1303  Stop Time: 1351  Time Calculation (min): 48 min     PT Therapeutic Procedures Time Entry  Therapeutic Exercise Time Entry: 45                 APPROVED- 6 VISITS, 04/08/25-06/06/25  Visit Count: 2/6    Assessment:   Able to do steps and lat amb without seated rest breaks between. Added a minute to nustep and some reps to seated exercises.   Plan:   standing marches/leg kicks; 1# to PRE's    Current Problem  1. Weakness of both lower extremities  Follow Up In Physical Therapy      2. Bilateral lower extremity pain  Follow Up In Physical Therapy          Subjective    \"I got the shots in my knees, so they are feeling better. Back is okay right now too\"  Precautions   Diabetes, heart disease, osteoporosis   Pain   0/10 B knees    Objective   Amb w/SPC    Treatments:  Therapeutic Exercise   Nustep x 6 min   Seated toe raises 5 sec x 20   Seated heel raise 5lb KB on knee x 20   Seated ball squeeze 5 sec x 20   LAQ 5 sec x 20 ea   Seated clam 5 sec x 20 (green band)   Seated march 3 sec 2 x 10 ea   STS 3 x 7, 30 sec rest between sets   Lumbar flexion w/pball x 10   4\" step up w/BUE support x 10 ea    Lat amb x 2 laps          Goals:  Active       PT Problem       Pt will be able to negotiate a 4 inch step with use of cane to indicate improving LE strength and balance.  (Progressing)       Start:  02/07/25    Expected End:  03/09/25            Pt will be able to perform 5XSTS without Ues to indicate improving strength and balance.  (Met)       Start:  02/07/25    Expected End:  03/09/25    Resolved:  04/02/25         The patient will improve score on LEFS by 10 points to indicate decreased pain and increased functional level.   (Progressing)       Start:  02/07/25    Expected End:  04/08/25            Pt will increase BLE strength to 4+/5 in weakened " muscle groups to facilitate improved ability to negotiate steps and curbs.  (Progressing)       Start:  02/07/25    Expected End:  04/08/25

## 2025-05-02 ENCOUNTER — DOCUMENTATION (OUTPATIENT)
Dept: PHYSICAL THERAPY | Facility: CLINIC | Age: 85
End: 2025-05-02
Payer: MEDICARE

## 2025-05-02 NOTE — PROGRESS NOTES
Physical Therapy                 Therapy Communication Note    Patient Name: Marcelle Hewitt  MRN: [id number]  Department:   Room: Room/bed info not found  Today's Date: 5/2/2025     Discipline: Physical Therapy          Missed Visit Reason:      Missed Time: No Show    Comment:

## 2025-05-06 ENCOUNTER — OFFICE VISIT (OUTPATIENT)
Dept: PRIMARY CARE | Facility: CLINIC | Age: 85
End: 2025-05-06
Payer: MEDICARE

## 2025-05-06 VITALS
SYSTOLIC BLOOD PRESSURE: 128 MMHG | HEART RATE: 72 BPM | DIASTOLIC BLOOD PRESSURE: 72 MMHG | HEIGHT: 60 IN | BODY MASS INDEX: 36.71 KG/M2 | OXYGEN SATURATION: 96 % | WEIGHT: 187 LBS

## 2025-05-06 DIAGNOSIS — R35.0 INCREASED URINARY FREQUENCY: ICD-10-CM

## 2025-05-06 DIAGNOSIS — R30.0 DYSURIA: Primary | ICD-10-CM

## 2025-05-06 PROCEDURE — 1160F RVW MEDS BY RX/DR IN RCRD: CPT | Performed by: STUDENT IN AN ORGANIZED HEALTH CARE EDUCATION/TRAINING PROGRAM

## 2025-05-06 PROCEDURE — 1159F MED LIST DOCD IN RCRD: CPT | Performed by: STUDENT IN AN ORGANIZED HEALTH CARE EDUCATION/TRAINING PROGRAM

## 2025-05-06 PROCEDURE — 1036F TOBACCO NON-USER: CPT | Performed by: STUDENT IN AN ORGANIZED HEALTH CARE EDUCATION/TRAINING PROGRAM

## 2025-05-06 PROCEDURE — 3074F SYST BP LT 130 MM HG: CPT | Performed by: STUDENT IN AN ORGANIZED HEALTH CARE EDUCATION/TRAINING PROGRAM

## 2025-05-06 PROCEDURE — 3078F DIAST BP <80 MM HG: CPT | Performed by: STUDENT IN AN ORGANIZED HEALTH CARE EDUCATION/TRAINING PROGRAM

## 2025-05-06 PROCEDURE — G2211 COMPLEX E/M VISIT ADD ON: HCPCS | Performed by: STUDENT IN AN ORGANIZED HEALTH CARE EDUCATION/TRAINING PROGRAM

## 2025-05-06 PROCEDURE — 99213 OFFICE O/P EST LOW 20 MIN: CPT | Performed by: STUDENT IN AN ORGANIZED HEALTH CARE EDUCATION/TRAINING PROGRAM

## 2025-05-06 RX ORDER — AMOXICILLIN AND CLAVULANATE POTASSIUM 875; 125 MG/1; MG/1
875 TABLET, FILM COATED ORAL 2 TIMES DAILY
Qty: 20 TABLET | Refills: 0 | Status: SHIPPED | OUTPATIENT
Start: 2025-05-06 | End: 2025-05-16

## 2025-05-06 ASSESSMENT — ENCOUNTER SYMPTOMS
OCCASIONAL FEELINGS OF UNSTEADINESS: 0
DEPRESSION: 0
LOSS OF SENSATION IN FEET: 0

## 2025-05-06 NOTE — PROGRESS NOTES
84-year-old female presenting for recurrent UTI.  Symptoms of dysuria and increased urinary frequency.  Following with urogynecology.  Has discussed daily prophylactic antibiotics.  Has not initiated at this time.  Patient on Jardiance, told by cardiology that she cannot be stopped.    12 point ROS reviewed and negative other than as stated in HPI    General: Alert, oriented, pleasant, in no acute distress  HEENT:      Head: normocephalic, atraumatic;      eyes: EOMI, no scleral icterus;   CV: Heart with regular rate and rhythm, normal S1/S2, no murmurs  Lungs: CTAB without wheezing, rhonchi or rales; good respiratory effort, no increased work of breathing  Neuro: Cranial nerves grossly intact; alert and oriented  Psych: Appropriate mood and affect      #Recurrent UTI  -Urinalysis, culture  -High suspicion this is being exacerbated by Jardiance, patient reports that her cardiologist states she cannot stop medication, though I do not see this in the notes, and would recommend patient get clarification  - Given multiple allergies, and uses of flecainide interacting with Cipro, will give Augmentin 875 mg twice daily empirically    Follow-up as scheduled, sooner if needed    Christopher D'Amico, DO

## 2025-05-07 DIAGNOSIS — D72.829 LEUKOCYTOSIS, UNSPECIFIED TYPE: ICD-10-CM

## 2025-05-07 DIAGNOSIS — N18.31 STAGE 3A CHRONIC KIDNEY DISEASE (MULTI): Primary | ICD-10-CM

## 2025-05-07 LAB
ALBUMIN SERPL-MCNC: 4.2 G/DL (ref 3.6–5.1)
ALBUMIN/CREAT UR: 99 MG/G CREAT
ALP SERPL-CCNC: 59 U/L (ref 37–153)
ALT SERPL-CCNC: 17 U/L (ref 6–29)
ANION GAP SERPL CALCULATED.4IONS-SCNC: 14 MMOL/L (CALC) (ref 7–17)
AST SERPL-CCNC: 18 U/L (ref 10–35)
BILIRUB SERPL-MCNC: 0.4 MG/DL (ref 0.2–1.2)
BUN SERPL-MCNC: 31 MG/DL (ref 7–25)
CALCIUM SERPL-MCNC: 9.3 MG/DL (ref 8.6–10.4)
CHLORIDE SERPL-SCNC: 105 MMOL/L (ref 98–110)
CHOLEST SERPL-MCNC: 173 MG/DL
CHOLEST/HDLC SERPL: 3.3 (CALC)
CO2 SERPL-SCNC: 23 MMOL/L (ref 20–32)
CREAT SERPL-MCNC: 1.35 MG/DL (ref 0.6–0.95)
CREAT UR-MCNC: 98 MG/DL (ref 20–275)
EGFRCR SERPLBLD CKD-EPI 2021: 38 ML/MIN/1.73M2
ERYTHROCYTE [DISTWIDTH] IN BLOOD BY AUTOMATED COUNT: 14.4 % (ref 11–15)
EST. AVERAGE GLUCOSE BLD GHB EST-MCNC: 134 MG/DL
EST. AVERAGE GLUCOSE BLD GHB EST-SCNC: 7.4 MMOL/L
GLUCOSE SERPL-MCNC: 118 MG/DL (ref 65–99)
HBA1C MFR BLD: 6.3 %
HCT VFR BLD AUTO: 42.9 % (ref 35–45)
HDLC SERPL-MCNC: 52 MG/DL
HGB BLD-MCNC: 14.1 G/DL (ref 11.7–15.5)
LDLC SERPL CALC-MCNC: 88 MG/DL (CALC)
MCH RBC QN AUTO: 29.9 PG (ref 27–33)
MCHC RBC AUTO-ENTMCNC: 32.9 G/DL (ref 32–36)
MCV RBC AUTO: 90.9 FL (ref 80–100)
MICROALBUMIN UR-MCNC: 9.7 MG/DL
NONHDLC SERPL-MCNC: 121 MG/DL (CALC)
PLATELET # BLD AUTO: 237 THOUSAND/UL (ref 140–400)
PMV BLD REES-ECKER: 10.8 FL (ref 7.5–12.5)
POTASSIUM SERPL-SCNC: 4.1 MMOL/L (ref 3.5–5.3)
PROT SERPL-MCNC: 6.6 G/DL (ref 6.1–8.1)
RBC # BLD AUTO: 4.72 MILLION/UL (ref 3.8–5.1)
SODIUM SERPL-SCNC: 142 MMOL/L (ref 135–146)
TRIGL SERPL-MCNC: 237 MG/DL
WBC # BLD AUTO: 12.8 THOUSAND/UL (ref 3.8–10.8)

## 2025-05-08 LAB
APPEARANCE UR: ABNORMAL
BACTERIA #/AREA URNS HPF: ABNORMAL /HPF
BACTERIA UR CULT: ABNORMAL
BILIRUB UR QL STRIP: NEGATIVE
COLOR UR: YELLOW
GLUCOSE UR QL STRIP: ABNORMAL
HGB UR QL STRIP: ABNORMAL
HYALINE CASTS #/AREA URNS LPF: ABNORMAL /LPF
KETONES UR QL STRIP: ABNORMAL
LEUKOCYTE ESTERASE UR QL STRIP: ABNORMAL
NITRITE UR QL STRIP: POSITIVE
PH UR STRIP: 5.5 [PH] (ref 5–8)
PROT UR QL STRIP: ABNORMAL
RBC #/AREA URNS HPF: ABNORMAL /HPF
SERVICE CMNT-IMP: ABNORMAL
SP GR UR STRIP: 1.03 (ref 1–1.03)
SQUAMOUS #/AREA URNS HPF: ABNORMAL /HPF
WBC #/AREA URNS HPF: ABNORMAL /HPF

## 2025-05-09 ENCOUNTER — APPOINTMENT (OUTPATIENT)
Dept: PHYSICAL THERAPY | Facility: CLINIC | Age: 85
End: 2025-05-09
Payer: MEDICARE

## 2025-05-12 DIAGNOSIS — N39.0 RECURRENT UTI: Primary | ICD-10-CM

## 2025-05-12 RX ORDER — GRANULES FOR ORAL 3 G/1
3 POWDER ORAL WEEKLY
Qty: 36 G | Refills: 0 | Status: SHIPPED | OUTPATIENT
Start: 2025-05-12 | End: 2025-08-10

## 2025-05-12 NOTE — PROGRESS NOTES
Returned pt call. She is currently being treated by PCP for UTI (positive culture 5/6/25). Last positive culture 3/21/25. She is utilizing vaginal estrogen but did not realize she had refill for Hiprex so has not been taking it.    Will also do 3 month antibiotic prophylaxis.    Latoya Mcfarland MD, Gynecologist  [facility] Fellow  Dept of Urology/OBGYN  5/12/2025

## 2025-05-16 ENCOUNTER — TREATMENT (OUTPATIENT)
Dept: PHYSICAL THERAPY | Facility: CLINIC | Age: 85
End: 2025-05-16
Payer: MEDICARE

## 2025-05-16 DIAGNOSIS — M79.604 BILATERAL LOWER EXTREMITY PAIN: ICD-10-CM

## 2025-05-16 DIAGNOSIS — R29.898 WEAKNESS OF BOTH LOWER EXTREMITIES: ICD-10-CM

## 2025-05-16 DIAGNOSIS — M79.605 BILATERAL LOWER EXTREMITY PAIN: ICD-10-CM

## 2025-05-16 PROCEDURE — 97110 THERAPEUTIC EXERCISES: CPT | Mod: GP,CQ

## 2025-05-16 NOTE — PROGRESS NOTES
"Physical Therapy    Physical Therapy Treatment    Patient Name: Marcelle Hewitt  MRN: [id number]  Today's Date: 5/16/2025    Time Entry:   Time Calculation  Start Time: 1115  Stop Time: 1157  Time Calculation (min): 42 min     PT Therapeutic Procedures Time Entry  Therapeutic Exercise Time Entry: 40                 APPROVED- 6 VISITS, 04/08/25-06/06/25  Visit Count: 2/6    Assessment:  Pt was able to gently progress.  Performed seated wx's w/ 4\" step for more neutral LE position  Plan:   standing marches/leg kicks; 1# to PRE's    Current Problem  1. Weakness of both lower extremities  Follow Up In Physical Therapy      2. Bilateral lower extremity pain  Follow Up In Physical Therapy          Subjective    \"I am tired today.\"  Precautions   Diabetes, heart disease, osteoporosis   Pain   0/10 B knees    Objective   Amb w/SPC    Treatments:  Therapeutic Exercise   Nustep x 6 min   Seated heel raise #6 on knee x 20   Seated ball squeeze 5 sec x 20   LAQ 5 sec x 20 ea   Seated clam 5 sec x 20 (green band)   Seated march 3 sec 2 x 10 ea   STS 2x10, 30 sec rest between sets   Lumbar flexion w/pball x 10   4\" step up w/BUE support x 15 ea               3 way hip x 10 ea, bilat   Lat amb x 2 laps, not today              3way balance feet apart, feet together x 1, ea, 10\", SBA          Goals:  Active       PT Problem       Pt will be able to negotiate a 4 inch step with use of cane to indicate improving LE strength and balance.  (Progressing)       Start:  02/07/25    Expected End:  03/09/25            Pt will be able to perform 5XSTS without Ues to indicate improving strength and balance.  (Met)       Start:  02/07/25    Expected End:  03/09/25    Resolved:  04/02/25         The patient will improve score on LEFS by 10 points to indicate decreased pain and increased functional level.   (Progressing)       Start:  02/07/25    Expected End:  04/08/25            Pt will increase BLE strength to 4+/5 in weakened muscle groups " to facilitate improved ability to negotiate steps and curbs.  (Progressing)       Start:  02/07/25    Expected End:  04/08/25

## 2025-05-23 ENCOUNTER — TREATMENT (OUTPATIENT)
Dept: PHYSICAL THERAPY | Facility: CLINIC | Age: 85
End: 2025-05-23
Payer: MEDICARE

## 2025-05-23 DIAGNOSIS — R29.898 WEAKNESS OF BOTH LOWER EXTREMITIES: ICD-10-CM

## 2025-05-23 DIAGNOSIS — M79.605 BILATERAL LOWER EXTREMITY PAIN: ICD-10-CM

## 2025-05-23 DIAGNOSIS — M79.604 BILATERAL LOWER EXTREMITY PAIN: ICD-10-CM

## 2025-05-23 PROCEDURE — 97110 THERAPEUTIC EXERCISES: CPT | Mod: GP,CQ

## 2025-05-27 ENCOUNTER — HOSPITAL ENCOUNTER (OUTPATIENT)
Dept: CARDIOLOGY | Facility: CLINIC | Age: 85
Discharge: HOME | End: 2025-05-27
Payer: MEDICARE

## 2025-05-27 DIAGNOSIS — I44.2 ATRIOVENTRICULAR BLOCK, COMPLETE (MULTI): ICD-10-CM

## 2025-05-27 DIAGNOSIS — Z95.0 PRESENCE OF CARDIAC PACEMAKER: ICD-10-CM

## 2025-05-30 ENCOUNTER — TREATMENT (OUTPATIENT)
Dept: PHYSICAL THERAPY | Facility: CLINIC | Age: 85
End: 2025-05-30
Payer: MEDICARE

## 2025-05-30 DIAGNOSIS — R29.898 WEAKNESS OF BOTH LOWER EXTREMITIES: ICD-10-CM

## 2025-05-30 DIAGNOSIS — M79.604 BILATERAL LOWER EXTREMITY PAIN: ICD-10-CM

## 2025-05-30 DIAGNOSIS — M79.605 BILATERAL LOWER EXTREMITY PAIN: ICD-10-CM

## 2025-05-30 PROCEDURE — 97110 THERAPEUTIC EXERCISES: CPT | Mod: GP

## 2025-05-30 NOTE — PROGRESS NOTES
"Physical Therapy    Physical Therapy Treatment    Patient Name: Marcelle Hewitt  MRN: [id number]  Today's Date: 5/30/2025    Time Entry:                           APPROVED- 6 VISITS, 04/08/25-06/06/25  Visit Count: 5/6    Assessment:  Pt lety's good strength gains in BLE's and reports good functional improvements gentry on stairs.   Plan:   Discharge to [facility]    Current Problem  1. Weakness of both lower extremities  Follow Up In Physical Therapy      2. Bilateral lower extremity pain  Follow Up In Physical Therapy          Subjective    \"Overall feeling good. Did a lot yesterday and was tired.\"   Precautions   Diabetes, heart disease, osteoporosis   Pain  4/10 B knees    Objective              Strength R / L :                          Hip Flexion:          4+ / 4+                          Hip Extension:     4 / 4                          Hip Abduction:    4 / 4                          Hip Adduction:    4+ / 4+                          Hip ER:                 4+ / 4+                          Hip IR:                  5 / 5                          Knee Extension:   4+ / 4+                          Knee Flexion:       4+ / 4+                          Ankle DF:             4+ / 4+                          Ankle PF:              5 / 5                 Neurological: Intact in majority of BLEs, just numbness in B feet.                  Gait: Amb with cane in R hand, slight forward flexed trunk posture, decreased L stance time                 Outcome Measure:                          LEFS : 39 / 80     14.29 seconds 5XSTS  TUG 17.75 with cane; 15.09 without    Treatments:  Therapeutic Exercise  Recheck  LAQ #2 2x10 ea  Seated H flex #2 2x10  Seated ball squeeze x 20  Seated clam x 20, green TB  Seated HSC x 20 ea, Green TB  4\" step ups x 10 ea, fwd/lateral  HR x 10, standing  Nu step x 6 min    Not today:  Lumbar flexion w/pball x 10  3 way hip x 10 ea, bilat  Lat amb x 2 laps, not today  3way balance feet apart, feet together " "x 1, ea, 10\", SBA, not today          Goals:  Resolved       PT Problem       Pt will be able to negotiate a 4 inch step with use of cane to indicate improving LE strength and balance.  (Met)       Start:  02/07/25    Expected End:  03/09/25    Resolved:  05/30/25         Pt will be able to perform 5XSTS without Ues to indicate improving strength and balance.  (Met)       Start:  02/07/25    Expected End:  03/09/25    Resolved:  04/02/25         The patient will improve score on LEFS by 10 points to indicate decreased pain and increased functional level.   (Met)       Start:  02/07/25    Expected End:  04/08/25    Resolved:  05/30/25         Pt will increase BLE strength to 4+/5 in weakened muscle groups to facilitate improved ability to negotiate steps and curbs.  (Adequate for Discharge)       Start:  02/07/25    Expected End:  04/08/25                  "

## 2025-06-05 ENCOUNTER — APPOINTMENT (OUTPATIENT)
Dept: PHARMACY | Facility: HOSPITAL | Age: 85
End: 2025-06-05
Payer: MEDICARE

## 2025-06-09 ENCOUNTER — HOSPITAL ENCOUNTER (OUTPATIENT)
Dept: CARDIOLOGY | Facility: CLINIC | Age: 85
Discharge: HOME | End: 2025-06-09
Payer: MEDICARE

## 2025-06-09 DIAGNOSIS — Z95.0 PACEMAKER: ICD-10-CM

## 2025-06-09 DIAGNOSIS — R00.1 BRADYCARDIA ON ECG: ICD-10-CM

## 2025-06-09 DIAGNOSIS — I44.2 CHB (COMPLETE HEART BLOCK): ICD-10-CM

## 2025-06-09 PROCEDURE — 93296 REM INTERROG EVL PM/IDS: CPT

## 2025-06-12 ENCOUNTER — APPOINTMENT (OUTPATIENT)
Dept: PHARMACY | Facility: HOSPITAL | Age: 85
End: 2025-06-12
Payer: MEDICARE

## 2025-06-12 DIAGNOSIS — I50.32 CHRONIC DIASTOLIC CONGESTIVE HEART FAILURE: ICD-10-CM

## 2025-06-12 DIAGNOSIS — I48.91 ATRIAL FIBRILLATION, UNSPECIFIED TYPE (MULTI): ICD-10-CM

## 2025-06-12 NOTE — ASSESSMENT & PLAN NOTE
Patient currently on 3 of 4 GDMT medications. Patient with recurrent UTIs, 3 UTIs in 4 months. Referring provider made aware and asked to advise.    Labs (05/06/2025): K-4.1 Scr-1.35 eGFR-38

## 2025-06-12 NOTE — Clinical Note
"Nighat Caal,  Today I spoke with Marcelle about her heart medications. Patient reports having recurrent UTI's since last follow up visit, she has had 3 in 4 months. Patient states Dr. Zhou told her she needs to stay on medication. 2/6/2025 Dr. Zhou OV encounter \"her dyspnea has improved significantly since starting jardiance so the benefit exceeds risk of UTI\". I recommend patient stop Jardiance due to recurrent UTI's. Please advice on continuation of Jardiance.  Patient is tolerating all other heart medications with no concerning issues. She reports occasional dizziness but no falls and endorses swelling below her knees. Patient was reminded to take Lasix for fluid retention. Plan to follow up in 1 month. Thank you!"

## 2025-06-12 NOTE — PROGRESS NOTES
Pharmacist Clinic: Cardiology Management    Marcelle Hewitt is a 84 y.o. female was referred to Clinical Pharmacy Team for anticoagulation and heart failure management.     Referring Provider: Afua Best APRN*    THIS IS A FOLLOW UP PATIENT APPOINTMENT. AT LAST VISIT ON 01/08/2025 WITH PHARMACIST (Vivian Bob).    Appointment was completed by Marcelle who was reached at primary number.    REVIEW OF PAST APPNT (IF APPLICABLE):   Patient reports doing well on Eliquis, reports adherence, no adverse effects and denies s/s of bleeding. Patient reports she fell and fractured her wrist in October, imaging of head was negative, denies other falls/hospitalizations since.  Patient reports tolerating her heart failure regimen. At last follow up visit Marcelle reported lightheadedness 1-2x weekly, she reports this has since resolved. Marclele reports adherence and denies s/s of worsening heart failure. Marcelle reports in late November she saw urologist for UTI, patient was treated with cipro empirically and then changed to trimethoprim. Explained to patient this is a side effect of Jardiance, she reports no other urinary symptoms currently. Will continue to monitor urinary symptoms and encourage hydration, if patient develops recurrent UTI, will plan to stop Jardiance.  Patient continues to reports a pain through her back and legs, recommend she contact PCP for further evaluation.  Patient aware at next follow up visit we will be discussing  PAP renewal.     Allergies Reviewed? No    Allergies   Allergen Reactions    Cephalexin Nausea/vomiting    Macrobid [Nitrofurantoin Monohyd/M-Cryst] Nausea/vomiting    Niacin Unknown    Pregabalin Unknown    Sulfa (Sulfonamide Antibiotics) Hives       Past Medical History:   Diagnosis Date    Abnormal finding of blood chemistry, unspecified 07/08/2022    Abnormal blood chemistry    Anesthesia of skin 11/12/2020    Numbness of foot    Bilateral primary osteoarthritis of hip 03/01/2021     Osteoarthritis, hip, bilateral    Dermatochalasis of unspecified eye, unspecified eyelid 05/06/2019    Dermatochalasis    Dry eye syndrome of bilateral lacrimal glands 03/10/2022    Dry eyes    Dry eye syndrome of unspecified lacrimal gland 04/29/2016    Dry eye syndrome    Encounter for prophylactic measures, unspecified 11/12/2020    Need for prophylactic measure    Encounter for screening mammogram for malignant neoplasm of breast     Visit for screening mammogram    Headache, unspecified 07/02/2021    Morning headache    History of falling 07/12/2017    History of fall    Keratoconjunctivitis sicca, not specified as Sjogren's, bilateral 03/10/2022    Keratoconjunctivitis sicca of both eyes not due to Sjogren's syndrome    Meibomian gland dysfunction left eye, upper and lower eyelids 03/10/2022    Meibomian gland dysfunction (MGD) of upper and lower eyelid of left eye    Meibomian gland dysfunction right eye, upper and lower eyelids 03/10/2022    Meibomian gland dysfunction (MGD) of upper and lower eyelid of right eye    Menopausal and female climacteric states 04/29/2016    Postmenopausal disorder    Morbid (severe) obesity due to excess calories (Multi) 07/08/2022    Class 2 severe obesity with serious comorbidity in adult    Myopia, bilateral 03/10/2022    Myopia with presbyopia of both eyes    Myopia, right eye 03/10/2022    Myopia of right eye    Nonexudative age-related macular degeneration, bilateral, early dry stage 05/06/2019    Early dry stage nonexudative age-related macular degeneration of both eyes    Obesity, unspecified 12/02/2020    Obesity (BMI 30-39.9)    Obstructive sleep apnea (adult) (pediatric) 09/21/2017    ALEXANDER on CPAP    Other abnormalities of breathing 12/20/2021    Difficulty breathing    Other conditions influencing health status     DEXA Body Composition Study    Other conditions influencing health status     History of pregnancy    Other fracture of upper and lower end of left  fibula, subsequent encounter for closed fracture with routine healing 06/04/2019    Closed fracture of distal end of left fibula with routine healing, unspecified fracture morphology, subsequent encounter    Other headache syndrome 07/13/2016    Other headache syndrome    Other secondary cataract, unspecified eye     PCO (posterior capsular opacification)    Other specified abnormal findings of blood chemistry 04/08/2020    Elevated serum creatinine    Other symptoms and signs involving the musculoskeletal system 09/08/2021    Weakness of both lower extremities    Other vitreous opacities, unspecified eye     Floaters    Pain in leg, unspecified 11/13/2019    Leg pain    Pain in unspecified limb 09/14/2017    Limb pain    Personal history of diseases of the skin and subcutaneous tissue 04/11/2019    History of cellulitis    Personal history of other diseases of the musculoskeletal system and connective tissue 07/25/2017    History of neck pain    Personal history of other diseases of the musculoskeletal system and connective tissue 07/12/2017    History of muscle spasm    Personal history of other diseases of the musculoskeletal system and connective tissue 06/27/2019    History of osteoporosis    Personal history of other diseases of the musculoskeletal system and connective tissue 03/02/2021    History of spinal stenosis    Personal history of other diseases of the nervous system and sense organs 03/10/2022    History of blepharitis    Personal history of other diseases of the nervous system and sense organs 11/16/2017    History of sciatica    Personal history of other diseases of the nervous system and sense organs 04/17/2019    History of iritis    Personal history of other drug therapy 10/06/2021    History of influenza vaccination    Personal history of other medical treatment 02/22/2021    History of screening mammography    Personal history of other specified conditions 10/06/2021    History of excessive  sweating    Personal history of other specified conditions 02/15/2022    History of headache    Personal history of other specified conditions 12/08/2020    History of shortness of breath    Personal history of other specified conditions 12/19/2017    History of urinary frequency    Personal history of other specified conditions 04/03/2018    History of edema    Personal history of other specified conditions 07/08/2022    History of edema    Personal history of other specified conditions 04/12/2019    History of chronic pain    Personal history of urinary (tract) infections 07/15/2021    History of urinary tract infection    Polyosteoarthritis, unspecified 11/12/2020    Osteoarthritis of multiple joints    Presence of intraocular lens     Presence of artificial intra-ocular lens    Presence of intraocular lens 03/10/2022    Pseudophakia of both eyes    Presence of spectacles and contact lenses     Wears eyeglasses    Shortness of breath 06/30/2022    SOB (shortness of breath) on exertion    Spinal stenosis, lumbar region without neurogenic claudication 02/15/2022    Lumbar canal stenosis    Unilateral primary osteoarthritis, left knee 05/07/2020    Primary osteoarthritis of left knee    Unspecified disorder of refraction 03/10/2022    Refractive error    Unspecified epiphora, unspecified side     Eye tearing    Urinary tract infection, site not specified 12/19/2017    Recurrent UTI       Current Outpatient Medications on File Prior to Visit   Medication Sig Dispense Refill    apixaban (Eliquis) 2.5 mg tablet Take 1 tablet (2.5 mg) by mouth 2 times a day. 180 tablet 3    calcium phosphate-vitamin D3 250 mg-12.5 mcg (500 unit) tablet,chewable Chew 1 tablet once daily.      cholecalciferol (Vitamin D-3) 50 mcg (2,000 unit) capsule Take 1 capsule (50 mcg) by mouth once daily.      denosumab (Prolia) 60 mg/mL syringe 60 mg subq every 6 months 1 mL 3    diclofenac sodium (Voltaren) 1 % gel gel Apply topically 3 times a  day as needed.      empagliflozin (Jardiance) 25 mg tablet Take 1 tablet (25 mg) by mouth once daily. 90 tablet 3    esomeprazole (NexIUM) 20 mg DR capsule Take 1 capsule (20 mg) by mouth once daily.      estradiol (Estrace) 0.01 % (0.1 mg/gram) vaginal cream Place a dime sized amount vaginally nightly for 2 weeks then 3 times a week thereafter. 42.5 g 3    flecainide (Tambocor) 50 mg tablet Take 1 tablet (50 mg) by mouth 2 times a day. 180 tablet 3    fluconazole (Diflucan) 150 mg tablet Take 1 tablet by mouth now and repeat in 3 days. 2 tablet 0    fosfomycin (Monurol) 3 gram packet Take 3 g by mouth 1 (one) time per week. 36 g 0    furosemide (Lasix) 40 mg tablet Take 1 tablet (40 mg) by mouth once daily. PRN swelling      losartan (Cozaar) 100 mg tablet Take 1 tablet (100 mg) by mouth once daily. 90 tablet 3    lubricating eye drops ophthalmic solution 1 drop if needed for dry eyes.      methenamine hippurate (Hiprex) 1 gram tablet Take 1 tablet (1 g) by mouth 2 times a day. 60 tablet 2    metoprolol tartrate (Lopressor) 25 mg tablet Take 1 tablet (25 mg) by mouth 2 times a day. 180 tablet 3    rosuvastatin (Crestor) 40 mg tablet Take 1 tablet (40 mg) by mouth once daily. 90 tablet 3     No current facility-administered medications on file prior to visit.         RELEVANT LAB RESULTS  Lab Results   Component Value Date    BILITOT 0.4 05/06/2025    CALCIUM 9.3 05/06/2025    CO2 23 05/06/2025     05/06/2025    CREATININE 1.35 (H) 05/06/2025    GLUCOSE 118 (H) 05/06/2025    ALKPHOS 59 05/06/2025    K 4.1 05/06/2025    PROT 6.6 05/06/2025     05/06/2025    AST 18 05/06/2025    ALT 17 05/06/2025    BUN 31 (H) 05/06/2025    ANIONGAP 14 05/06/2025    MG 2.00 03/17/2023    PHOS 3.8 02/15/2024    ALBUMIN 4.2 05/06/2025    GFRF 58 (A) 07/19/2023     Lab Results   Component Value Date    TRIG 237 (H) 05/06/2025    CHOL 173 05/06/2025    LDLCALC 88 05/06/2025    HDL 52 05/06/2025     No results found for:  "\"BMCBC\", \"CBCDIF\"     PHARMACEUTICAL ASSESSMENT    MEDICATION RECONCILIATION    Drug Interactions? No    Medication Documentation Review Audit       Reviewed by Christopher D'Amico, DO (Physician) on 05/06/25 at 1504      Medication Order Taking? Sig Documenting Provider Last Dose Status   amoxicillin-clavulanate (Augmentin) 875-125 mg tablet [id number]  Take 1 tablet (875 mg) by mouth 2 times a day for 10 days. Christopher D'Amico, DO  Active   apixaban (Eliquis) 2.5 mg tablet [id number] Yes Take 1 tablet (2.5 mg) by mouth 2 times a day. KAREEM Solomon-CNP Taking Active   calcium phosphate-vitamin D3 250 mg-12.5 mcg (500 unit) tablet,chewable [id number] Yes Chew 1 tablet once daily. Historical Provider, MD Taking Active   cholecalciferol (Vitamin D-3) 50 mcg (2,000 unit) capsule [id number] Yes Take 1 capsule (50 mcg) by mouth once daily. Historical Provider, MD Taking Active   denosumab (Prolia) 60 mg/mL syringe [id number] Yes 60 mg subq every 6 months Christopher D'Amico, DO Taking Active   diclofenac sodium (Voltaren) 1 % gel gel [id number] Yes Apply topically 3 times a day as needed. Historical Provider, MD Taking Active   empagliflozin (Jardiance) 25 mg tablet [id number] Yes Take 1 tablet (25 mg) by mouth once daily. CAIO Solomon Taking Active   esomeprazole (NexIUM) 20 mg DR capsule [id number] Yes Take 1 capsule (20 mg) by mouth once daily. Historical Provider, MD Taking Active   estradiol (Estrace) 0.01 % (0.1 mg/gram) vaginal cream [id number] Yes Place a dime sized amount vaginally nightly for 2 weeks then 3 times a week thereafter. Guevara Sainz MD  Active   flecainide (Tambocor) 50 mg tablet [id number] Yes Take 1 tablet (50 mg) by mouth 2 times a day. Sebastian White MD  Active   fluconazole (Diflucan) 150 mg tablet [id number] Yes Take 1 tablet by mouth now and repeat in 3 days. Guevara Sainz MD  Active   furosemide (Lasix) 40 mg tablet [id number] Yes Take 1 tablet (40 mg) by mouth once " daily. PRN swelling Historical Provider, MD Taking Active   losartan (Cozaar) 100 mg tablet [id number] Yes Take 1 tablet (100 mg) by mouth once daily. Balwinder Lawler MD Taking Active   lubricating eye drops ophthalmic solution [id number] Yes 1 drop if needed for dry eyes. Historical Provider, MD Taking Active   methenamine hippurate (Hiprex) 1 gram tablet [id number] Yes Take 1 tablet (1 g) by mouth 2 times a day. Latoya Mcfarland MD  Active   metoprolol tartrate (Lopressor) 25 mg tablet [id number] Yes Take 1 tablet (25 mg) by mouth 2 times a day. Sebastian White MD  Active   rosuvastatin (Crestor) 40 mg tablet [id number] Yes Take 1 tablet (40 mg) by mouth once daily. Balwinder Lawler MD  Active                    DISEASE MANAGEMENT ASSESSMENT:     ANTICOAGULATION ASSESSMENT    The ASCVD Risk score (Thomas STAPLES, et al., 2019) failed to calculate for the following reasons:    The 2019 ASCVD risk score is only valid for ages 40 to 79    DIAGNOSIS: prevention of nonvalvular atrial fibrilliation stroke and systemic embolism  - Patient is projected to be on anticoagulation indefinitely  - [id number] Score: [6] (only included if diagnosis is atrial fibrillation)   Age: [<65 (0)] [65-74 (+1)] [> 75 (+2)]: 2  Sex: [Male/Female (+1)]: 1  CHF history: [No/Yes(+1)]: 1  Hypertension history: [No/Yes(+1)]: 1  Stroke/TIA/thromboembolism history: [No/Yes(+2)]: 0  Vascular disease history (prior MI, peripheral artery disease, aortic plaque): [No/Yes(+1)]: 0  Diabetes history: [No/Yes(+1)]: 1    CURRENT PHARMACOTHERAPY:    Eliquis 2.5mg twice daily  85yo  84.8kg  Scr 1.35 (05/06/2025)    Provider prefers lower dose due to kidney function     RELEVANT PAST MEDICAL HISTORY:   Afib, HTN, CHF, HLD    Affordability/Accessibility:  PAP   Adherence/Organization: reports adherence, uses pillbox  Adverse Reactions: none reported  Recent Hospitalizations: none  Recent Falls/Trauma: none reported  Changes in Tobacco or Alcohol Intake:   Tobacco: does  not use  Alcohol: seldom    EDUCATION/COUNSELING:   - Counseled patient on MOA, expectations, duration of therapy, contraindications, administration, and monitoring parameters  - Counseled patient of side effects that are indicative of bleeding such as dark tarry stool, unexplainable bruising, or vomiting up a coffee ground like substance  -Counseled patient on the importance of going to the ER/seeing a provider after falls or physical trauma.     CHF ASSESSMENT     Symptom/Staging:  -Most recent ejection fraction: 55-60%  -NYHA Stage: unknown    Results for orders placed in visit on 04/12/22    Echocardiogram    Narrative  [facility], [address] [place] [address]  Tel [phone] and Fax [phone]    TRANSTHORACIC ECHOCARDIOGRAM REPORT      Patient Name:     DANETTE TAVO COHEN Reading Physician:   [id number] Balwinder Perez MD  Study Date:       4/12/2022          Referring Physician: BALWINDER PEREZ  MRN/PID:          [id number]           PCP:  Accession/Order#: [id number]       Department Location: [facility]  YOB: 1940          Fellow:  Gender:           F                  Nurse:  Admit Date:                          Sonographer:         Ender Knight [facility]  Admission Status: Outpatient         Additional Staff:  Height:           152.40 cm          CC Report to:  Weight:           92.99 kg           Study Type:          Echocardiogram  BSA:              1.89 m2  Blood Pressure: 156 /71 mmHg    Diagnosis/ICD: I49.9-Cardiac arrhythmia, unspecified; R01.1-Cardiac murmur,  unspecified  Indication:    Cardiac arrhythmia; Systolic murmur  Procedure/CPT: Echo Complete w Full [id number]    Patient History:  Pertinent History: Edema; HTN; SOB; DM II; PPM; chronic diastolic heart failure;  dyspnea; DLD; ALEXANDER; arrhythmia; obesity; systolic murmur.    Study Detail: The following Echo studies were performed: 2D, M-Mode, Doppler and  color flow. Technically challenging study due to  body habitus.      PHYSICIAN INTERPRETATION:  Left Ventricle: The left ventricular systolic function is normal, with an estimated ejection fraction of 55-60%. The calculated ejection fraction is normal at 66 % using the Mcnair's Bi-plane MOD calculation. There are no regional wall motion abnormalities. The left ventricular cavity size is normal. Abnormal (paradoxical) septal motion, consistent with RV pacemaker. Spectral Doppler shows a normal pattern of left ventricular diastolic filling. There is mild hypokinesis and dyssynchrony of the anteroseptal wall.  Left Atrium: The left atrium is mild to moderately dilated.  Right Ventricle: The right ventricle is normal in size. There is normal right ventriclar wall thickness. There is normal right ventricular global systolic function. A device is visualized in the right ventricle.  Right Atrium: The right atrium is normal in size. There is a device visualized in the right atrium.  Aortic Valve: The aortic valve appears structurally normal. The aortic valve appears tricuspid and non-restricted. There is no evidence of aortic valve regurgitation. The peak instantaneous gradient of the aortic valve is 20.3 mmHg. The mean gradient of the aortic valve is 12.0 mmHg.  Mitral Valve: The mitral valve is normal in structure. There is normal mitral valve leaflet mobility. There is mild mitral annular calcification. There is mild mitral valve regurgitation.  Tricuspid Valve: The tricuspid valve is structurally normal. There is normal tricuspid valve leaflet mobility. There is mild to moderate tricuspid regurgitation. The Doppler estimated RVSP is mildly elevated at 44.9 mmHg.  Pulmonic Valve: The pulmonic valve is structurally normal. There is trace pulmonic valve regurgitation.  Pericardium: There is no pericardial effusion noted.  Aorta: The aortic root is normal. The Asc Ao is 2.70 cm. There is no dilatation of the aortic arch. There is no dilatation of the ascending aorta.  There is no dilatation of the aortic root. There is plaque visualized in the ascending aorta, which is classified as a Grade 2 [mild (focal or diffuse) intimal thickening of 2-3 mm] atherosclerosis.  Pulmonary Artery: The pulmonary artery is normal in size. The tricuspid regurgitant velocity is 3.24 m/s, and with an estimated right atrial pressure of 3 mmHg, the estimated pulmonary artery pressure is mildly elevated with the RVSP at 41.6 mmHg. The estimated PASP is 45 mmHg.      CONCLUSIONS:  1. The left ventricular systolic function is normal with a 55-60% estimated ejection fraction.  2. Abnormal septal motion consistent with RV pacemaker.  3. There is mild hypokinesis and dyssynchrony of the anteroseptal wall.  4. The left atrium is mild to moderately dilated.  5. Mildly elevated RVSP.  6. There is mild to moderate tricuspid regurgitation.  7. The estimated PASP is 45 mmHg.  8. There is plaque visualized in the ascending aorta.    QUANTITATIVE DATA SUMMARY:  2D MEASUREMENTS:  Normal Ranges:  Ao Root d:     2.80 cm   (2.0-3.7cm)  LAs:           4.27 cm   (2.7-4.0cm)  IVSd:          1.27 cm   (0.6-1.1cm)  LVPWd:         0.88 cm   (0.6-1.1cm)  LVIDd:         4.56 cm   (3.9-5.9cm)  LVIDs:         3.28 cm  LV Mass Index: 91.6 g/m2  LV % FS        28.2 %    LA VOLUME:  Normal Ranges:  LA Vol A4C:       47.7 ml    (22+/-6mL/m2)  LA Vol A2C:       46.4 ml  LA Vol BP:        49.8 ml  LA Vol Index A4C: 25.3ml/m2  LA Vol Index A2C: 24.6 ml/m2  LA Vol Index BP:  26.4 ml/m2  LA Volume Index:  26.3 ml/m2  LA Vol A4C:       43.8 ml  LA Vol A2C:       44.6 ml    RA VOLUME BY A/L METHOD:  Normal Ranges:  RA Area A4C: 14.2 cm2    AORTA MEASUREMENTS:  Normal Ranges:  Asc Ao, d: 2.70 cm (2.1-3.4cm)    LV SYSTOLIC FUNCTION BY 2D PLANIMETRY (MOD):  Normal Ranges:  EF-A4C View: 69.6 % (>55%)  EF-A2C View: 62.6 %  EF-Biplane:  66.4 %    LV DIASTOLIC FUNCTION:  Normal Ranges:  MV Peak E:      1.14 m/s    (0.7-1.2 m/s)  MV Peak A:       0.85 m/s    (0.42-0.7 m/s)  E/A Ratio:      1.34        (1.0-2.2)  MV e'           0.07 m/s    (>8.0)  MV A Dur:       129.40 msec  E/e' Ratio:     16.34       (<8.0)  MV DT:          205 msec    (150-240 msec)  PulmV Sys [place]:  76.29 cm/s  PulmV Mao [place]: 62.44 cm/s  PulmV S/D [place]:  1.22    MITRAL VALVE:  Normal Ranges:  MV DT: 205 msec (150-240msec)    AORTIC VALVE:  Normal Ranges:  AoV Vmax:                2.25 m/s  (<1.7m/s)  AoV Peak P.3 mmHg (<20mmHg)  AoV Mean P.0 mmHg (1.7-11.5mmHg)  LVOT Max [place]:            1.30 m/s  (<1.1m/s)  AoV VTI:                 52.62 cm  (18-25cm)  LVOT VTI:                31.20 cm  LVOT Diameter:           1.77 cm   (1.8-2.4cm)  AoV Area, VTI:           1.46 cm2  (2.5-5.5cm2)  AoV Area,Vmax:           1.42 cm2  (2.5-4.5cm2)  AoV Dimensionless Index: 0.59    RIGHT VENTRICLE:  RV 1   3.6 cm  RV 2   2.8 cm  RV 3   5.5 cm  TAPSE: 25.0 mm  RV s'  0.12 m/s    TRICUSPID VALVE/RVSP:  Normal Ranges:  Peak TR Velocity: 3.24 m/s  RV Syst Pressure: 44.9 mmHg (< 30mmHg)  IVC Diam:         1.40 cm    PULMONIC VALVE:  Normal Ranges:  PV Max [place]: 1.0 m/s  (0.6-0.9m/s)  PV Max PG:  3.8 mmHg    Pulmonary Veins:  PulmV Mao [place]: 62.44 cm/s  PulmV S/D [place]:  1.22  PulmV Sys [place]:  76.29 cm/s      [id number] Balwinder Lawler MD  Electronically signed on 2022 at 9:52:01 AM         Final       Guideline-Directed Medical Therapy:  -ARNI: No   -If no, then ACEi/ARB?: Yes, describe: Losartan 100mg daily  -Beta Blocker: Yes, describe: metoprolol tartrate 25mg twice daily  -MRA: No  -SGLT2i: Yes, describe: Jardiance 25mg daily    Secondary Prevention:  -The ASCVD Risk score (Thomas STAPLES, et al., 2019) failed to calculate for the following reasons:    The 2019 ASCVD risk score is only valid for ages 40 to 79   -Aspirin 81mg? yes  -Statin?: Yes, describe: Rosuvastatin 40mg daily  -HTN?: Yes    CURRENT PHARMACOTHERAPY:   Losartan 100mg daily  Metoprolol tartrate 25mg twice  daily  Jardiance 25mg daily  Furosemide 40mg daily PRN     Affordability:  PAP   Adherence/Compliance: reports adherence  Adverse Effects: occasional lightheadedness, no falls     Monitoring Weights at Home: No  Home Weight Recordings: unable to assess    Past In Office Weight Readings:   Wt Readings from Last 6 Encounters:   05/06/25 84.8 kg (187 lb)   04/14/25 83 kg (183 lb)   03/21/25 86.2 kg (190 lb)   03/12/25 86.2 kg (190 lb)   02/06/25 86.2 kg (190 lb 2 oz)   01/24/25 89.4 kg (197 lb)       Monitoring Blood Pressure at Home: No  Home BP Recordings: unable to assess     Past In Office BP Readings:   BP Readings from Last 6 Encounters:   05/06/25 128/72   04/14/25 119/77   03/27/25 130/62   02/06/25 122/66   01/24/25 (!) 141/49   12/17/24 123/69       HEALTH MANAGEMENT    Maintaining fluid restriction (<2 L/day): No  Edema/swelling: Yes -below her knees, patient has not taken furosemide to help.  Shortness of breath: Yes on exertion, walking from kitchen to living room-- patient states this has significantly improved with Jardiance.  Trouble sleeping/lying down: No  Dry/hacking cough: No  Recent Hospitalizations: No    EDUCATION/COUNSELING:   - Counseled patient on MOA, expectations, duration of therapy, contraindications, administration, and monitoring parameters  - Counseled patient on lifestyle modifications that can decrease your risk of having complications (smoking cessation, losing weight, daily weights, vaccines)  - Counseled patient on fluid intake and weight management. Recommended to not consume more than 2 liters of fliuids per day. If they have gained more than 2-3 pounds within a 24 hours period (or 5 pounds in a week), contact their cardiologist  - Answered all patient questions and concerns     DISCUSSION/NOTES:   Patient reports doing well on Eliquis. She reports adherence, no adverse effects and denies s/s of bleeding.  Patient reports tolerating losartan and metoprolol, she reports  "occasional dizziness but no falls. Patient does not take weight or BP at home. Patient endorses swelling below her knees, she has not taken furosemide to help with swelling. Reminded patient to take Lasix PRN for swelling, she verbalized understanding. Patient denies all other s/s of worsening heart failure.   Patient reports having recurrent UTI's since last follow up visit, she has had 3 in the last 4 months. Patient states Dr. Zhou told her she needs to stay on medication. 2/6/2025 Dr. Zhou OV encounter \"her dyspnea has improved significantly since starting jardiance so the benefit exceeds risk of UTI\".   I recommend patient stop Jardiance due to recurrent UTI's. Referring provider made aware and asked to advise.  Plan to submit  PAP application for renewal once income documentation is received.      ASSESSMENT AND PLAN:   Patient Assistance Program (PAP) - RENEWAL     Per regulation, patient is due for their annual renewal for their  PAP application. Patient's application is due for renewal by 07/05/2025. Patient understands that if proper documentation is not received before renewal date, they will no longer be able to receive approved medication(s) free of charge.     Application for program to be submitted for the following medications: Eliquis, Jardiance    [facility]   Prescription Insurance:   Yes   Members of Household: 2   Files Taxes: Yes       Patient will be faxing financial information to pharmacist directly at [phone].    Patient verbally reports monthly or yearly income which is less than 400% federal poverty level    Patient aware this process may take up to 6 weeks.     If approved medication must be filled through [facility] and MEDICATION WILL BE MAILED TO PATIENT.     Assessment/Plan   Problem List Items Addressed This Visit       Chronic diastolic congestive heart failure    Patient currently on 3 of 4 GDMT medications. Patient with " recurrent UTIs, 3 UTIs in 4 months. Referring provider made aware and asked to advise.    Labs (05/06/2025): K-4.1 Scr-1.35 eGFR-38         Relevant Orders    Referral to Clinical Pharmacy    Cardiac arrhythmia    Relevant Orders    Referral to Clinical Pharmacy         RECOMMENDATIONS/PLAN    CONTINUE  Eliquis 2.5mg twice daily (Provider prefers lower dose due to kidney function)   Losartan 100mg daily  Metoprolol tartrate 25mg twice daily    2. Referring provider to advise on continuation of Jardiance.   A. Referring provider agreeable to stop Jardiance due to UTI and let us know if breathing becomes an issue again. [facility] relayed message to patient, changes will be reflected in medication list. Patient scheduled for appt with referring provider in 2 weeks.        Last Appnt with Referring Provider: 12/17/2024  Next Appnt with Referring Provider: 06/24/2025  Clinical Pharmacist follow up: 1 month  VAF/Application Expiration: Yes    Date: 07/05/2025  Type of Encounter: Damien RomanD    Verbal consent to manage patient's drug therapy was obtained from the patient . They were informed they may decline to participate or withdraw from participation in pharmacy services at any time.    Continue all meds under the continuation of care with the referring provider and clinical pharmacy team.

## 2025-06-13 ENCOUNTER — APPOINTMENT (OUTPATIENT)
Dept: UROLOGY | Facility: CLINIC | Age: 85
End: 2025-06-13
Payer: MEDICARE

## 2025-06-13 VITALS — HEIGHT: 60 IN | WEIGHT: 191 LBS | BODY MASS INDEX: 37.5 KG/M2 | TEMPERATURE: 97.5 F

## 2025-06-13 DIAGNOSIS — N95.2 VAGINAL ATROPHY: ICD-10-CM

## 2025-06-13 DIAGNOSIS — R30.0 DYSURIA: Primary | ICD-10-CM

## 2025-06-13 PROCEDURE — 99214 OFFICE O/P EST MOD 30 MIN: CPT | Performed by: STUDENT IN AN ORGANIZED HEALTH CARE EDUCATION/TRAINING PROGRAM

## 2025-06-13 PROCEDURE — 1159F MED LIST DOCD IN RCRD: CPT | Performed by: STUDENT IN AN ORGANIZED HEALTH CARE EDUCATION/TRAINING PROGRAM

## 2025-06-13 PROCEDURE — 1036F TOBACCO NON-USER: CPT | Performed by: STUDENT IN AN ORGANIZED HEALTH CARE EDUCATION/TRAINING PROGRAM

## 2025-06-13 PROCEDURE — 1126F AMNT PAIN NOTED NONE PRSNT: CPT | Performed by: STUDENT IN AN ORGANIZED HEALTH CARE EDUCATION/TRAINING PROGRAM

## 2025-06-13 PROCEDURE — G2211 COMPLEX E/M VISIT ADD ON: HCPCS | Performed by: STUDENT IN AN ORGANIZED HEALTH CARE EDUCATION/TRAINING PROGRAM

## 2025-06-13 RX ORDER — TRIMETHOPRIM 100 MG/1
100 TABLET ORAL 2 TIMES DAILY
Qty: 6 TABLET | Refills: 0 | Status: SHIPPED | OUTPATIENT
Start: 2025-06-13 | End: 2025-06-16

## 2025-06-13 RX ORDER — ESTRADIOL 0.1 MG/G
CREAM VAGINAL
Qty: 42.5 G | Refills: 11 | Status: SHIPPED | OUTPATIENT
Start: 2025-06-13

## 2025-06-13 ASSESSMENT — PAIN SCALES - GENERAL: PAINLEVEL_OUTOF10: 0-NO PAIN

## 2025-06-13 ASSESSMENT — ENCOUNTER SYMPTOMS: FEVER: 0

## 2025-06-13 NOTE — PROGRESS NOTES
History Of Present Illness  Marcelle Hewitt is a 84 y.o. female with CKD presenting with recurrent UTI despite vaginal estrogen in the context of menopause and diabetes on SGLT-2 inhibiter. She plans to ask her cardiologist again about if her cardiac condition may be able to be controlled with other medications after her echo results to see if the Jardiance was making a difference. She does feel urinary burning today     Positive urine cx: 11/14/24, 3/21/25 (despite vaginal estrogen), 5/6/25 (despite vaginal estrogen+Hiprex).   Currently feels like she has UTI despite prophylactic fosfomycin+Hiprex, stopped vaginal estrogen 3 weeks ago then restarted 2 days ago     Renal function: GFR 38 5/6/25  A1C: 6.3 March 2025  Renal stone hx: no  PVR March 2025: 33  Allergies: nausea with keflex, nausea with macrobid, hives with sulfa abx     Dr. Sainz pt; here while he is on sabbatical     Past Medical History  She has a past medical history of Abnormal finding of blood chemistry, unspecified (07/08/2022), Anesthesia of skin (11/12/2020), Bilateral primary osteoarthritis of hip (03/01/2021), Dermatochalasis of unspecified eye, unspecified eyelid (05/06/2019), Dry eye syndrome of bilateral lacrimal glands (03/10/2022), Dry eye syndrome of unspecified lacrimal gland (04/29/2016), Encounter for prophylactic measures, unspecified (11/12/2020), Encounter for screening mammogram for malignant neoplasm of breast, Headache, unspecified (07/02/2021), History of falling (07/12/2017), Keratoconjunctivitis sicca, not specified as Sjogren's, bilateral (03/10/2022), Meibomian gland dysfunction left eye, upper and lower eyelids (03/10/2022), Meibomian gland dysfunction right eye, upper and lower eyelids (03/10/2022), Menopausal and female climacteric states (04/29/2016), Morbid (severe) obesity due to excess calories (Multi) (07/08/2022), Myopia, bilateral (03/10/2022), Myopia, right eye (03/10/2022), Nonexudative age-related macular  degeneration, bilateral, early dry stage (05/06/2019), Obesity, unspecified (12/02/2020), Obstructive sleep apnea (adult) (pediatric) (09/21/2017), Other abnormalities of breathing (12/20/2021), Other conditions influencing health status, Other conditions influencing health status, Other fracture of upper and lower end of left fibula, subsequent encounter for closed fracture with routine healing (06/04/2019), Other headache syndrome (07/13/2016), Other secondary cataract, unspecified eye, Other specified abnormal findings of blood chemistry (04/08/2020), Other symptoms and signs involving the musculoskeletal system (09/08/2021), Other vitreous opacities, unspecified eye, Pain in leg, unspecified (11/13/2019), Pain in unspecified limb (09/14/2017), Personal history of diseases of the skin and subcutaneous tissue (04/11/2019), Personal history of other diseases of the musculoskeletal system and connective tissue (07/25/2017), Personal history of other diseases of the musculoskeletal system and connective tissue (07/12/2017), Personal history of other diseases of the musculoskeletal system and connective tissue (06/27/2019), Personal history of other diseases of the musculoskeletal system and connective tissue (03/02/2021), Personal history of other diseases of the nervous system and sense organs (03/10/2022), Personal history of other diseases of the nervous system and sense organs (11/16/2017), Personal history of other diseases of the nervous system and sense organs (04/17/2019), Personal history of other drug therapy (10/06/2021), Personal history of other medical treatment (02/22/2021), Personal history of other specified conditions (10/06/2021), Personal history of other specified conditions (02/15/2022), Personal history of other specified conditions (12/08/2020), Personal history of other specified conditions (12/19/2017), Personal history of other specified conditions (04/03/2018), Personal history of other  specified conditions (07/08/2022), Personal history of other specified conditions (04/12/2019), Personal history of urinary (tract) infections (07/15/2021), Polyosteoarthritis, unspecified (11/12/2020), Presence of intraocular lens, Presence of intraocular lens (03/10/2022), Presence of spectacles and contact lenses, Shortness of breath (06/30/2022), Spinal stenosis, lumbar region without neurogenic claudication (02/15/2022), Unilateral primary osteoarthritis, left knee (05/07/2020), Unspecified disorder of refraction (03/10/2022), Unspecified epiphora, unspecified side, and Urinary tract infection, site not specified (12/19/2017).    Surgical History  She has a past surgical history that includes Other surgical history (06/04/2019); Cardiac pacemaker placement (09/17/2021); and Cataract extraction (11/15/2013).     Social History  She reports that she has never smoked. She has never used smokeless tobacco. She reports that she does not drink alcohol and does not use drugs.    Family History  Family History[1]     Allergies  Cephalexin, Macrobid [nitrofurantoin monohyd/m-cryst], Niacin, Pregabalin, and Sulfa (sulfonamide antibiotics)    Review of Systems   Constitutional:  Negative for fever.   All other systems reviewed and are negative.       Physical Exam   Con: awake, alert, NAD  HEENT: normocephalic, speech normal  CV: no peripheral edema  Resp: no increased work of breathing  Neuro: normal mentation  Psych: mood normal  Skin: no rash      Last Recorded Vitals  Temperature 36.4 °C (97.5 °F), height 1.524 m (5'), weight 86.6 kg (191 lb).    Relevant Results    No results found. However, due to the size of the patient record, not all encounters were searched. Please check Results Review for a complete set of results.     Assessment/Plan     Recurrent UTI:  Prophylaxis via vaginal estrogen, Hiprex, weekly fosfomycin. Pt restarting vaginal estrogen now and will also start cranberry supplement 500mg-1000mg daily and  D mannose  Ok for Hiprex as long as GFR remains above 30. Referred to nephrology at prior visit to monitor CKD and this visit is currently scheduled  Continue good diabetic control. Unfortunately SGLT2 inhibitors do increase UTI risk but this medication has been deemed important for her heart health by her cardiologist which takes priority  Standing urine cultures. Empiric tx given for UTI sx today  Return for cystoscopy as well as  follow up in 3 months    Latoya Mcfarland MD, Gynecologist  Female Re[facility] Sexual Medicine Fellow  Dept of Urology/OBGYN  6/13/2025         [1]   Family History  Problem Relation Name Age of Onset    Heart disease Mother      Sleep apnea Mother      Heart disease Father      Sleep apnea Father      Breast cancer Sister      Diabetes Sister      Hypertension Sister      Multiple sclerosis Sister      Strabismus Child

## 2025-06-15 LAB — BACTERIA UR CULT: NORMAL

## 2025-06-16 ENCOUNTER — TELEPHONE (OUTPATIENT)
Dept: CARDIOLOGY | Facility: CLINIC | Age: 85
End: 2025-06-16
Payer: MEDICARE

## 2025-06-20 ENCOUNTER — TELEPHONE (OUTPATIENT)
Dept: PHARMACY | Facility: HOSPITAL | Age: 85
End: 2025-06-20
Payer: MEDICARE

## 2025-06-20 PROCEDURE — RXMED WILLOW AMBULATORY MEDICATION CHARGE

## 2025-06-20 NOTE — TELEPHONE ENCOUNTER
Patient Assistance Program Approval:     We are pleased to inform you that your application for assistance has been approved.     This approval is valid through 06/20/2026 as long as the following criteria continue to be satisfied:     Your medication (Eliquis) remains covered under your current insurance plan.   Your prescriber does not discontinue therapy.   You do not seek reimbursement from any other private or government-funded programs for the  medication.    Under this program, the pharmacy will first bill your insurance plan for your indemnified specified medication. The [employer] Assistance Fund will then offset your copay balance, so that your out-of pocket expense for your specialty medication will be $0.00.    Vivian Bob, PharmD

## 2025-06-24 ENCOUNTER — HOSPITAL ENCOUNTER (OUTPATIENT)
Dept: CARDIOLOGY | Facility: CLINIC | Age: 85
Discharge: HOME | End: 2025-06-24
Payer: MEDICARE

## 2025-06-24 ENCOUNTER — PHARMACY VISIT (OUTPATIENT)
Dept: PHARMACY | Facility: CLINIC | Age: 85
End: 2025-06-24
Payer: MEDICARE

## 2025-06-24 ENCOUNTER — OFFICE VISIT (OUTPATIENT)
Dept: CARDIOLOGY | Facility: CLINIC | Age: 85
End: 2025-06-24
Payer: MEDICARE

## 2025-06-24 VITALS
SYSTOLIC BLOOD PRESSURE: 117 MMHG | HEIGHT: 60 IN | HEART RATE: 70 BPM | BODY MASS INDEX: 37.5 KG/M2 | WEIGHT: 191 LBS | OXYGEN SATURATION: 96 % | DIASTOLIC BLOOD PRESSURE: 79 MMHG

## 2025-06-24 DIAGNOSIS — Z95.0 PRESENCE OF CARDIAC PACEMAKER: ICD-10-CM

## 2025-06-24 DIAGNOSIS — R94.31 ABNORMAL ELECTROCARDIOGRAM (ECG) (EKG): ICD-10-CM

## 2025-06-24 DIAGNOSIS — E78.5 HYPERLIPIDEMIA, UNSPECIFIED HYPERLIPIDEMIA TYPE: ICD-10-CM

## 2025-06-24 DIAGNOSIS — R42 DIZZINESS AND GIDDINESS: Primary | ICD-10-CM

## 2025-06-24 LAB
AORTIC VALVE MEAN GRADIENT: 9 MMHG
AORTIC VALVE PEAK VELOCITY: 1.92 M/S
AV PEAK GRADIENT: 15 MMHG
AVA (PEAK VEL): 1.48 CM2
AVA (VTI): 1.55 CM2
EJECTION FRACTION APICAL 4 CHAMBER: 60.1
EJECTION FRACTION: 53 %
LEFT ATRIUM VOLUME AREA LENGTH INDEX BSA: 24.7 ML/M2
LEFT VENTRICLE INTERNAL DIMENSION DIASTOLE: 4.83 CM (ref 3.5–6)
LEFT VENTRICULAR OUTFLOW TRACT DIAMETER: 1.97 CM
MITRAL VALVE E/A RATIO: 1.13
RIGHT VENTRICLE FREE WALL PEAK S': 10 CM/S
RIGHT VENTRICLE PEAK SYSTOLIC PRESSURE: 54 MMHG
TRICUSPID ANNULAR PLANE SYSTOLIC EXCURSION: 1.7 CM

## 2025-06-24 PROCEDURE — 1125F AMNT PAIN NOTED PAIN PRSNT: CPT | Performed by: NURSE PRACTITIONER

## 2025-06-24 PROCEDURE — 3074F SYST BP LT 130 MM HG: CPT | Performed by: NURSE PRACTITIONER

## 2025-06-24 PROCEDURE — 93306 TTE W/DOPPLER COMPLETE: CPT | Performed by: INTERNAL MEDICINE

## 2025-06-24 PROCEDURE — C8929 TTE W OR WO FOL WCON,DOPPLER: HCPCS

## 2025-06-24 PROCEDURE — 99214 OFFICE O/P EST MOD 30 MIN: CPT | Performed by: NURSE PRACTITIONER

## 2025-06-24 PROCEDURE — 1160F RVW MEDS BY RX/DR IN RCRD: CPT | Performed by: NURSE PRACTITIONER

## 2025-06-24 PROCEDURE — 1159F MED LIST DOCD IN RCRD: CPT | Performed by: NURSE PRACTITIONER

## 2025-06-24 PROCEDURE — 1036F TOBACCO NON-USER: CPT | Performed by: NURSE PRACTITIONER

## 2025-06-24 PROCEDURE — G2211 COMPLEX E/M VISIT ADD ON: HCPCS | Performed by: NURSE PRACTITIONER

## 2025-06-24 PROCEDURE — 3078F DIAST BP <80 MM HG: CPT | Performed by: NURSE PRACTITIONER

## 2025-06-24 PROCEDURE — 99212 OFFICE O/P EST SF 10 MIN: CPT | Mod: 25

## 2025-06-24 PROCEDURE — 2500000004 HC RX 250 GENERAL PHARMACY W/ HCPCS (ALT 636 FOR OP/ED): Performed by: NURSE PRACTITIONER

## 2025-06-24 RX ADMIN — PERFLUTREN 3 ML OF DILUTION: 6.52 INJECTION, SUSPENSION INTRAVENOUS at 14:21

## 2025-06-24 ASSESSMENT — PAIN SCALES - GENERAL: PAINLEVEL_OUTOF10: 4

## 2025-06-24 ASSESSMENT — ENCOUNTER SYMPTOMS
CARDIOVASCULAR NEGATIVE: 1
DEPRESSION: 0
LOSS OF SENSATION IN FEET: 0
GASTROINTESTINAL NEGATIVE: 1
RESPIRATORY NEGATIVE: 1
OCCASIONAL FEELINGS OF UNSTEADINESS: 0
CONSTITUTIONAL NEGATIVE: 1
NEUROLOGICAL NEGATIVE: 1
MUSCULOSKELETAL NEGATIVE: 1

## 2025-06-24 NOTE — PROGRESS NOTES
Chief Complaint:   Follow-up  device HPL    History Of Present Illness:    .Mrs Hewitt returns in follow up. Denies chest pain, sob, palpitations or pedal edema.  Had an echo done today which is not yet read.  Reports vertigo.  Referred to ENT.  Needs a new urologist.                 Last Recorded Vitals:  Blood pressure 117/79, pulse 70, height 1.524 m (5'), weight 86.6 kg (191 lb), SpO2 96%.     Past Medical History:  Medical History[1]     Past Surgical History:  Surgical History[2]    Social History:  Social History[3]    Family History:  Family History[4]      Allergies:  Cephalexin, Macrobid [nitrofurantoin monohyd/m-cryst], Niacin, Pregabalin, and Sulfa (sulfonamide antibiotics)    Outpatient Medications:  Current Medications[5]     Physical Exam:  Cardiovascular:      PMI at left midclavicular line. Normal rate. Regular rhythm. Normal S1. Normal S2.       Murmurs: There is no murmur.      No gallop.  No click. No rub.   Pulses:     Intact distal pulses.   Edema:     Peripheral edema absent.         ROS:  Review of Systems   Constitutional: Negative.   Cardiovascular: Negative.    Respiratory: Negative.     Skin: Negative.    Musculoskeletal: Negative.    Gastrointestinal: Negative.    Genitourinary: Negative.    Neurological: Negative.           Last Labs: A1c lipid cmp cbc 05/2025 tsh from 07/2024 reviewed  CBC -  Lab Results   Component Value Date    WBC 12.8 (H) 05/06/2025    HGB 14.1 05/06/2025    HCT 42.9 05/06/2025    MCV 90.9 05/06/2025     05/06/2025       CMP -  Lab Results   Component Value Date    CALCIUM 9.3 05/06/2025    PHOS 3.8 02/15/2024    PROT 6.6 05/06/2025    ALBUMIN 4.2 05/06/2025    AST 18 05/06/2025    ALT 17 05/06/2025    ALKPHOS 59 05/06/2025    BILITOT 0.4 05/06/2025       LIPID PANEL -   Lab Results   Component Value Date    CHOL 173 05/06/2025    TRIG 237 (H) 05/06/2025    HDL 52 05/06/2025    CHHDL 3.3 05/06/2025    LDLF 62 07/19/2023    VLDL 30 07/09/2024    Pending sale to [facility] 121  05/06/2025       RENAL FUNCTION PANEL -   Lab Results   Component Value Date    GLUCOSE 118 (H) 05/06/2025     05/06/2025    K 4.1 05/06/2025     05/06/2025    CO2 23 05/06/2025    ANIONGAP 14 05/06/2025    BUN 31 (H) 05/06/2025    CREATININE 1.35 (H) 05/06/2025    CALCIUM 9.3 05/06/2025    PHOS 3.8 02/15/2024    ALBUMIN 4.2 05/06/2025        Lab Results   Component Value Date     (H) 03/17/2023    HGBA1C 6.3 (H) 05/06/2025         Assessment/Plan   Problem List Items Addressed This Visit    None    1. Documented normal coronary arteries by cardiac cath 11/1994 and 10/2001 and 1/2018. The patient again had cardiac cath in [place] 01/2018. Normal coronary arteries. Echocardiogram was performed on 10/05/2016 showing a preserved LV ejection fraction of 55â€“60 percent with mild hypokinesis of the anteroseptal wall presumably due to pacemaker activity. There was however evidence of moderate pulmonary hypertension with moderate 2+ tricuspid valve regurgitation.  Echo 04/2022  CONCLUSIONS:   1. The left ventricular systolic function is normal with a 55-60% estimated ejection fraction.   2. Abnormal septal motion consistent with RV pacemaker.   3. There is mild hypokinesis and dyssynchrony of the anteroseptal wall.   4. The left atrium is mild to moderately dilated.   5. Mildly elevated RVSP.   6. There is mild to moderate tricuspid regurgitation.   7. The estimated PASP is 45 mmHg.   8. There is plaque visualized in the ascending aorta.  Echo done today not yet read.   2. Positive family history of CAD. Reviewed.  3. History of rate related left bundle branch block conduction delay. Reviewed.  4. Status post dual-chamber permanent pacemaker insertion 08/14/2008 for cardiogenic syncope with pulse generator replacement 08/12/2019. The patient did have a recent device check on 04/06/2020 showing right atrial pacing 87% right ventricular pacing only 1%. There were no ventricular high rate episodes. There  were 3 episodes of either atrial tachycardia or atrial fibrillation all lasting less than 1 minute. The patient did have a more recent pacemaker interrogation on 8/27/2020 done remotely. This demonstrated 100% right atrial pacing with 0% right ventricular pacemaking activity. The estimated battery life is 12 years. There were no episodes of atrial tachycardia or atrial fibrillation on Cardizem  mg daily.  The patient has had a more recent device interrogation 6/7/2023 estimated battery life 9.5 years.  5 episodes of high atrial rates longest lasting greater than 48 hours on 3/13/2023.  Total burden of atrial fibrillation is 2%.  She is atrial pacing 92% ventricular pacing 1%.  The patient is presently on flecainide 50 mg twice daily Eliquis 2.5 mg twice daily and metoprolol 25 mg twice daily. Stable.  5. Hypertension. The patient will remain on the same dose of the Cardizem  mg daily and Losartan 50 mg daily. If blood pressure elevates in the future could increase the dose of losartan. Is now solely on losartan 100 mg daily.  6. Hyperlipidemia. She is presently on rosuvastatin 40 mg daily and her most recent lab work from 04/01/2020 included a cholesterol of 130 LDL 63 HDL 40 triglyceride 132. The CBC and SMA panels were normal with creatinine of 1.11. The patient did have repeat lab work performed on 11/12/2020 with cholesterol 156 LDL 77 triglyceride 100 HDL 58. The liver function panel was normal. Creatinine was 1.22 and glycohemoglobin 6.4%. Vitamin D level was 30. The CBC was normal.  The patient had recent lab work 7/19/2023 with cholesterol 137 LDL 62 HDL 45 triglyceride 148 which is satisfactory.  The glycohemoglobin was 6.1% CBC and electrolyte panel is normal. FLP 05/2025 chol 173 HDL 52 LDL 88 trig 237.  7. History of cardiogenic syncope. Reviewed.  8. GERD. Reviewed.  9. Bronchospastic airway disease. Reviewed.  10.Osteoporosis. Reviewed.  11.History of atypical pneumonia.  Reviewed.  12.Remote left-sided cataract extraction. Reviewed.  13.Borderline type 2 diabetes. Recent A1C 6.3%.  14. PVD. Patient apparently saw PCP after visit from [place] with bilateral pedal edema and redness. Was switched from losartan-hydrochlorothiazide to furosemide 40 mg daily and given antibiotic. Remain on furosemide 40 mg daily. The patient did have lower extremity ultrasound on 04/03/2018 negative for DVT. Stable.   15. Obstructive sleep apnea: On CPAP. Reviewed.   16. Low-grade carotid vascular disease. Carotid US done 11/2017 showed < 50% stenosis bilaterally with possible left subclavian stenosis.   Reviewed.  17. OA. Left knee moderate OA. Follows with Dr Wing. Recent x-rays from 05/07/2020 demonstrated bilateral advanced medial compartmental DJD. Reviewed.  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 The patient is had 2 episodes of stumbling and falling over the past several weeks. On one occasion she tripped over a curb and another time on uneven pavement. A head CT on 11/22/2019 was unremarkable. A CT scan of the lumbosacral spine on 11/20/2019 showed multilevel DJD most prominent at L5-S1. the patient is followed by neurology.  Patient has received epidural injections to the lumbosacral spine for her chronic low back pain.  Reviewed.  19. Vitamin D deficiency. The patient had a vitamin D level of only 26 on 10/04/2019 as been placed on supplementation. Reviewed.  20. Obstructive sleep apnea. Continue follow-up with pulmonology and the use of a BiPAP mask. Reviewed.   21.  Mild CKD.  Cr 1.35.  22.  Frequent UTI.  Sees urology.  Off Jardiance.         Afua Best, KAREEM-CNP       [1]   Past Medical History:  Diagnosis Date    Abnormal finding of blood chemistry, unspecified 07/08/2022    Abnormal blood chemistry    Anesthesia of skin 11/12/2020    Numbness of foot    Bilateral primary osteoarthritis of hip 03/01/2021    Osteoarthritis, hip, bilateral    Dermatochalasis of unspecified eye, unspecified  eyelid 05/06/2019    Dermatochalasis    Dry eye syndrome of bilateral lacrimal glands 03/10/2022    Dry eyes    Dry eye syndrome of unspecified lacrimal gland 04/29/2016    Dry eye syndrome    Encounter for prophylactic measures, unspecified 11/12/2020    Need for prophylactic measure    Encounter for screening mammogram for malignant neoplasm of breast     Visit for screening mammogram    Headache, unspecified 07/02/2021    Morning headache    History of falling 07/12/2017    History of fall    Keratoconjunctivitis sicca, not specified as Sjogren's, bilateral 03/10/2022    Keratoconjunctivitis sicca of both eyes not due to Sjogren's syndrome    Meibomian gland dysfunction left eye, upper and lower eyelids 03/10/2022    Meibomian gland dysfunction (MGD) of upper and lower eyelid of left eye    Meibomian gland dysfunction right eye, upper and lower eyelids 03/10/2022    Meibomian gland dysfunction (MGD) of upper and lower eyelid of right eye    Menopausal and female climacteric states 04/29/2016    Postmenopausal disorder    Morbid (severe) obesity due to excess calories (Multi) 07/08/2022    Class 2 severe obesity with serious comorbidity in adult    Myopia, bilateral 03/10/2022    Myopia with presbyopia of both eyes    Myopia, right eye 03/10/2022    Myopia of right eye    Nonexudative age-related macular degeneration, bilateral, early dry stage 05/06/2019    Early dry stage nonexudative age-related macular degeneration of both eyes    Obesity, unspecified 12/02/2020    Obesity (BMI 30-39.9)    Obstructive sleep apnea (adult) (pediatric) 09/21/2017    ALEXANDER on CPAP    Other abnormalities of breathing 12/20/2021    Difficulty breathing    Other conditions influencing health status     DEXA Body Composition Study    Other conditions influencing health status     History of pregnancy    Other fracture of upper and lower end of left fibula, subsequent encounter for closed fracture with routine healing 06/04/2019     Closed fracture of distal end of left fibula with routine healing, unspecified fracture morphology, subsequent encounter    Other headache syndrome 07/13/2016    Other headache syndrome    Other secondary cataract, unspecified eye     PCO (posterior capsular opacification)    Other specified abnormal findings of blood chemistry 04/08/2020    Elevated serum creatinine    Other symptoms and signs involving the musculoskeletal system 09/08/2021    Weakness of both lower extremities    Other vitreous opacities, unspecified eye     Floaters    Pain in leg, unspecified 11/13/2019    Leg pain    Pain in unspecified limb 09/14/2017    Limb pain    Personal history of diseases of the skin and subcutaneous tissue 04/11/2019    History of cellulitis    Personal history of other diseases of the musculoskeletal system and connective tissue 07/25/2017    History of neck pain    Personal history of other diseases of the musculoskeletal system and connective tissue 07/12/2017    History of muscle spasm    Personal history of other diseases of the musculoskeletal system and connective tissue 06/27/2019    History of osteoporosis    Personal history of other diseases of the musculoskeletal system and connective tissue 03/02/2021    History of spinal stenosis    Personal history of other diseases of the nervous system and sense organs 03/10/2022    History of blepharitis    Personal history of other diseases of the nervous system and sense organs 11/16/2017    History of sciatica    Personal history of other diseases of the nervous system and sense organs 04/17/2019    History of iritis    Personal history of other drug therapy 10/06/2021    History of influenza vaccination    Personal history of other medical treatment 02/22/2021    History of screening mammography    Personal history of other specified conditions 10/06/2021    History of excessive sweating    Personal history of other specified conditions 02/15/2022    History of  headache    Personal history of other specified conditions 12/08/2020    History of shortness of breath    Personal history of other specified conditions 12/19/2017    History of urinary frequency    Personal history of other specified conditions 04/03/2018    History of edema    Personal history of other specified conditions 07/08/2022    History of edema    Personal history of other specified conditions 04/12/2019    History of chronic pain    Personal history of urinary (tract) infections 07/15/2021    History of urinary tract infection    Polyosteoarthritis, unspecified 11/12/2020    Osteoarthritis of multiple joints    Presence of intraocular lens     Presence of artificial intra-ocular lens    Presence of intraocular lens 03/10/2022    Pseudophakia of both eyes    Presence of spectacles and contact lenses     Wears eyeglasses    Shortness of breath 06/30/2022    SOB (shortness of breath) on exertion    Spinal stenosis, lumbar region without neurogenic claudication 02/15/2022    Lumbar canal stenosis    Unilateral primary osteoarthritis, left knee 05/07/2020    Primary osteoarthritis of left knee    Unspecified disorder of refraction 03/10/2022    Refractive error    Unspecified epiphora, unspecified side     Eye tearing    Urinary tract infection, site not specified 12/19/2017    Recurrent UTI   [2]   Past Surgical History:  Procedure Laterality Date    CARDIAC PACEMAKER PLACEMENT  09/17/2021    Pacemaker Permanent Placement    CATARACT EXTRACTION  11/15/2013    Cataract Surgery    OTHER SURGICAL HISTORY  06/04/2019    Tonsillectomy   [3]   Social History  Socioeconomic History    Marital status:    Tobacco Use    Smoking status: Never    Smokeless tobacco: Never   Vaping Use    Vaping status: Never Used   Substance and Sexual Activity    Alcohol use: Never    Drug use: Never   [4]   Family History  Problem Relation Name Age of Onset    Heart disease Mother      Sleep apnea Mother      Heart disease  Father      Sleep apnea Father      Breast cancer Sister      Diabetes Sister      Hypertension Sister      Multiple sclerosis Sister      Strabismus Child     [5]   Current Outpatient Medications   Medication Sig Dispense Refill    apixaban (Eliquis) 2.5 mg tablet Take 1 tablet (2.5 mg) by mouth 2 times a day. 180 tablet 3    calcium phosphate-vitamin D3 250 mg-12.5 mcg (500 unit) tablet,chewable Chew 1 tablet once daily.      cholecalciferol (Vitamin D-3) 50 mcg (2,000 unit) capsule Take 1 capsule (50 mcg) by mouth once daily.      denosumab (Prolia) 60 mg/mL syringe 60 mg subq every 6 months 1 mL 3    diclofenac sodium (Voltaren) 1 % gel gel Apply topically 3 times a day as needed.      esomeprazole (NexIUM) 20 mg DR capsule Take 1 capsule (20 mg) by mouth once daily.      estradiol (Estrace) 0.01 % (0.1 mg/gram) vaginal cream Place a dime sized amount 0.5g vaginally nightly 3 times a week. 42.5 g 11    flecainide (Tambocor) 50 mg tablet Take 1 tablet (50 mg) by mouth 2 times a day. 180 tablet 3    fluconazole (Diflucan) 150 mg tablet Take 1 tablet by mouth now and repeat in 3 days. 2 tablet 0    fosfomycin (Monurol) 3 gram packet Take 3 g by mouth 1 (one) time per week. 36 g 0    furosemide (Lasix) 40 mg tablet Take 1 tablet (40 mg) by mouth once daily. PRN swelling      losartan (Cozaar) 100 mg tablet Take 1 tablet (100 mg) by mouth once daily. 90 tablet 3    lubricating eye drops ophthalmic solution 1 drop if needed for dry eyes.      metoprolol tartrate (Lopressor) 25 mg tablet Take 1 tablet (25 mg) by mouth 2 times a day. 180 tablet 3    rosuvastatin (Crestor) 40 mg tablet Take 1 tablet (40 mg) by mouth once daily. 90 tablet 3     No current facility-administered medications for this visit.

## 2025-06-26 ENCOUNTER — TELEPHONE (OUTPATIENT)
Dept: CARDIOLOGY | Facility: CLINIC | Age: 85
End: 2025-06-26
Payer: MEDICARE

## 2025-07-01 ENCOUNTER — TELEPHONE (OUTPATIENT)
Dept: CARDIOLOGY | Facility: CLINIC | Age: 85
End: 2025-07-01
Payer: MEDICARE

## 2025-07-16 ENCOUNTER — HOSPITAL ENCOUNTER (OUTPATIENT)
Dept: RADIOLOGY | Facility: CLINIC | Age: 85
Discharge: HOME | End: 2025-07-16
Payer: MEDICARE

## 2025-07-16 ENCOUNTER — APPOINTMENT (OUTPATIENT)
Dept: ORTHOPEDIC SURGERY | Facility: CLINIC | Age: 85
End: 2025-07-16
Payer: MEDICARE

## 2025-07-16 DIAGNOSIS — M17.0 PRIMARY OSTEOARTHRITIS OF BOTH KNEES: ICD-10-CM

## 2025-07-16 PROCEDURE — 20610 DRAIN/INJ JOINT/BURSA W/O US: CPT | Performed by: ORTHOPAEDIC SURGERY

## 2025-07-16 PROCEDURE — 73564 X-RAY EXAM KNEE 4 OR MORE: CPT | Mod: RT

## 2025-07-16 PROCEDURE — 73564 X-RAY EXAM KNEE 4 OR MORE: CPT | Mod: RIGHT SIDE | Performed by: RADIOLOGY

## 2025-07-16 PROCEDURE — 1159F MED LIST DOCD IN RCRD: CPT | Performed by: ORTHOPAEDIC SURGERY

## 2025-07-16 PROCEDURE — 1036F TOBACCO NON-USER: CPT | Performed by: ORTHOPAEDIC SURGERY

## 2025-07-16 RX ORDER — TRIAMCINOLONE ACETONIDE 40 MG/ML
1 INJECTION, SUSPENSION INTRA-ARTICULAR; INTRAMUSCULAR
Status: COMPLETED | OUTPATIENT
Start: 2025-07-16 | End: 2025-07-16

## 2025-07-16 RX ORDER — LIDOCAINE HYDROCHLORIDE 10 MG/ML
4 INJECTION, SOLUTION INFILTRATION; PERINEURAL
Status: COMPLETED | OUTPATIENT
Start: 2025-07-16 | End: 2025-07-16

## 2025-07-16 RX ADMIN — LIDOCAINE HYDROCHLORIDE 4 ML: 10 INJECTION, SOLUTION INFILTRATION; PERINEURAL at 13:30

## 2025-07-16 RX ADMIN — TRIAMCINOLONE ACETONIDE 1 ML: 40 INJECTION, SUSPENSION INTRA-ARTICULAR; INTRAMUSCULAR at 13:30

## 2025-07-16 NOTE — PROGRESS NOTES
L Inj/Asp: bilateral knee on 7/16/2025 1:30 PM  Indications: pain and joint swelling  Details: 22 G needle, anterolateral approach  Medications (Right): 1 mL triamcinolone acetonide 40 mg/mL; 4 mL lidocaine 10 mg/mL (1 %)  Medications (Left): 1 mL triamcinolone acetonide 40 mg/mL; 4 mL lidocaine 10 mg/mL (1 %)  Outcome: tolerated well, no immediate complications    Discussion:  I discussed the conservative treatment options for knee osteoarthritis including but not limited to physical therapy, oral NSAIDS, activity and lifestyle modification, and corticosteroid injections. Pt has elected to undergo a cortisone injection today. I have explained the risk and benefits of an injection including the possibility of joint infection, bleeding, damage to cartilage, allergic reaction. Patient verbalized understanding and gave verbal consent wishes to proceed with a intra-articular cortisone injection for their knee.    Procedure:  After discussing the risk and benefits of the procedure, we proceeded with an intra-articular bilateral knee injection.    With the patient's informed verbal consent, the bilateral knees were prepped in standard sterile fashion with Chlorhexidine. The skin was then anesthetized with ethyl chloride spray and cleaned again with Chlorhexidine. The bilateral knees were then apirated/injected with a prefilled 20-gauge syringe of 40 mg Kenalog + 4 ml Lidocaine using the lateral approach without complications.  The patient tolerated this well and felt immediate initial relief of symptoms. A bandaid was applied and the patient ambulated out of the clinic on ther own accord without difficulty. Patient was instructed to avoid physical activity for 24-48 hours to prevent the knees from swelling and may ice the knees as tolerated. Patient should contact the office if any signs of of infection appear: redness, fever, chills, drainage, swelling or warmth to the knees.  Pt understands that the injections can be  repeated no sooner than 3 months.      Procedure, treatment alternatives, risks and benefits explained, specific risks discussed. Consent was given by the patient. Immediately prior to procedure a time out was called to verify the correct patient, procedure, equipment, support staff and site/side marked as required. Patient was prepped and draped in the usual sterile fashion.

## 2025-07-17 ENCOUNTER — APPOINTMENT (OUTPATIENT)
Dept: PHARMACY | Facility: HOSPITAL | Age: 85
End: 2025-07-17
Payer: MEDICARE

## 2025-07-17 ENCOUNTER — TELEPHONE (OUTPATIENT)
Dept: CARDIOLOGY | Facility: CLINIC | Age: 85
End: 2025-07-17

## 2025-07-17 DIAGNOSIS — I50.32 CHRONIC DIASTOLIC CONGESTIVE HEART FAILURE: ICD-10-CM

## 2025-07-17 DIAGNOSIS — I50.32 CHRONIC DIASTOLIC CONGESTIVE HEART FAILURE: Primary | ICD-10-CM

## 2025-07-17 DIAGNOSIS — I48.91 ATRIAL FIBRILLATION, UNSPECIFIED TYPE (MULTI): Primary | ICD-10-CM

## 2025-07-17 RX ORDER — FUROSEMIDE 40 MG/1
40 TABLET ORAL DAILY
Qty: 90 TABLET | Refills: 3 | Status: SHIPPED | OUTPATIENT
Start: 2025-07-17 | End: 2026-07-17

## 2025-07-17 NOTE — Clinical Note
Nighat Caal,  Today I spoke with Marcelle about her heart medications. Patient confirms stopping Jardiance due to recurring UTIs. Patient states since stopping Jardiance she is retaining more water/has more swelling. Patient took furosemide once since stopping Jardiance. I recommend patient take furosemide as needed rather than sparingly when she notices swelling/water retention. I also recommend patient take daily weights to monitor for acute weight fluctuation. She is tolerating losartan and metoprolol well, she reports occasional dizziness when switching positions but no falls. Other than increased swelling patient denies s/s of worsening heart failure. Plan to follow up in 6 weeks. Thank you!

## 2025-07-17 NOTE — ASSESSMENT & PLAN NOTE
Patient age, weight and renal function appropriate for Eliquis 5mg twice daily, however per provider patient needs lower dose due to kidney function (Scr 1.35 05/06/2025). Patient is 85yo and 86.6kg.

## 2025-07-17 NOTE — PROGRESS NOTES
"  Pharmacist Clinic: Cardiology Management    Marcelle Hewitt is a 84 y.o. female was referred to Clinical Pharmacy Team for anticoagulation and heart failure management.     Referring Provider: Afua Best APRN*    THIS IS A FOLLOW UP PATIENT APPOINTMENT. AT LAST VISIT ON 06/12/2025 WITH PHARMACIST (Vivian Bob).    Appointment was completed by Marcelle who was reached at primary number.    REVIEW OF PAST APPNT (IF APPLICABLE):   Patient reports doing well on Eliquis. She reports adherence, no adverse effects and denies s/s of bleeding.  Patient reports tolerating losartan and metoprolol, she reports occasional dizziness but no falls. Patient does not take weight or BP at home. Patient endorses swelling below her knees, she has not taken furosemide to help with swelling. Reminded patient to take Lasix PRN for swelling, she verbalized understanding. Patient denies all other s/s of worsening heart failure.   Patient reports having recurrent UTI's since last follow up visit, she has had 3 in the last 4 months. Patient states Dr. Zhou told her she needs to stay on medication. 2/6/2025 Dr. Zhou OV encounter \"her dyspnea has improved significantly since starting jardiance so the benefit exceeds risk of UTI\".   I recommend patient stop Jardiance due to recurrent UTI's. Referring provider agreeable.  Patient was successfully re-enrolled in [facility].    Allergies Reviewed? No    Allergies   Allergen Reactions    Cephalexin Nausea/vomiting    Macrobid [Nitrofurantoin Monohyd/M-Cryst] Nausea/vomiting    Niacin Unknown    Pregabalin Unknown    Sulfa (Sulfonamide Antibiotics) Hives       Past Medical History:   Diagnosis Date    Abnormal finding of blood chemistry, unspecified 07/08/2022    Abnormal blood chemistry    Anesthesia of skin 11/12/2020    Numbness of foot    Bilateral primary osteoarthritis of hip 03/01/2021    Osteoarthritis, hip, bilateral    Dermatochalasis of unspecified eye, unspecified eyelid " 05/06/2019    Dermatochalasis    Dry eye syndrome of bilateral lacrimal glands 03/10/2022    Dry eyes    Dry eye syndrome of unspecified lacrimal gland 04/29/2016    Dry eye syndrome    Encounter for prophylactic measures, unspecified 11/12/2020    Need for prophylactic measure    Encounter for screening mammogram for malignant neoplasm of breast     Visit for screening mammogram    Headache, unspecified 07/02/2021    Morning headache    History of falling 07/12/2017    History of fall    Keratoconjunctivitis sicca, not specified as Sjogren's, bilateral 03/10/2022    Keratoconjunctivitis sicca of both eyes not due to Sjogren's syndrome    Meibomian gland dysfunction left eye, upper and lower eyelids 03/10/2022    Meibomian gland dysfunction (MGD) of upper and lower eyelid of left eye    Meibomian gland dysfunction right eye, upper and lower eyelids 03/10/2022    Meibomian gland dysfunction (MGD) of upper and lower eyelid of right eye    Menopausal and female climacteric states 04/29/2016    Postmenopausal disorder    Morbid (severe) obesity due to excess calories (Multi) 07/08/2022    Class 2 severe obesity with serious comorbidity in adult    Myopia, bilateral 03/10/2022    Myopia with presbyopia of both eyes    Myopia, right eye 03/10/2022    Myopia of right eye    Nonexudative age-related macular degeneration, bilateral, early dry stage 05/06/2019    Early dry stage nonexudative age-related macular degeneration of both eyes    Obesity, unspecified 12/02/2020    Obesity (BMI 30-39.9)    Obstructive sleep apnea (adult) (pediatric) 09/21/2017    ALEXANDER on CPAP    Other abnormalities of breathing 12/20/2021    Difficulty breathing    Other conditions influencing health status     DEXA Body Composition Study    Other conditions influencing health status     History of pregnancy    Other fracture of upper and lower end of left fibula, subsequent encounter for closed fracture with routine healing 06/04/2019    Closed  fracture of distal end of left fibula with routine healing, unspecified fracture morphology, subsequent encounter    Other headache syndrome 07/13/2016    Other headache syndrome    Other secondary cataract, unspecified eye     PCO (posterior capsular opacification)    Other specified abnormal findings of blood chemistry 04/08/2020    Elevated serum creatinine    Other symptoms and signs involving the musculoskeletal system 09/08/2021    Weakness of both lower extremities    Other vitreous opacities, unspecified eye     Floaters    Pain in leg, unspecified 11/13/2019    Leg pain    Pain in unspecified limb 09/14/2017    Limb pain    Personal history of diseases of the skin and subcutaneous tissue 04/11/2019    History of cellulitis    Personal history of other diseases of the musculoskeletal system and connective tissue 07/25/2017    History of neck pain    Personal history of other diseases of the musculoskeletal system and connective tissue 07/12/2017    History of muscle spasm    Personal history of other diseases of the musculoskeletal system and connective tissue 06/27/2019    History of osteoporosis    Personal history of other diseases of the musculoskeletal system and connective tissue 03/02/2021    History of spinal stenosis    Personal history of other diseases of the nervous system and sense organs 03/10/2022    History of blepharitis    Personal history of other diseases of the nervous system and sense organs 11/16/2017    History of sciatica    Personal history of other diseases of the nervous system and sense organs 04/17/2019    History of iritis    Personal history of other drug therapy 10/06/2021    History of influenza vaccination    Personal history of other medical treatment 02/22/2021    History of screening mammography    Personal history of other specified conditions 10/06/2021    History of excessive sweating    Personal history of other specified conditions 02/15/2022    History of headache     Personal history of other specified conditions 12/08/2020    History of shortness of breath    Personal history of other specified conditions 12/19/2017    History of urinary frequency    Personal history of other specified conditions 04/03/2018    History of edema    Personal history of other specified conditions 07/08/2022    History of edema    Personal history of other specified conditions 04/12/2019    History of chronic pain    Personal history of urinary (tract) infections 07/15/2021    History of urinary tract infection    Polyosteoarthritis, unspecified 11/12/2020    Osteoarthritis of multiple joints    Presence of intraocular lens     Presence of artificial intra-ocular lens    Presence of intraocular lens 03/10/2022    Pseudophakia of both eyes    Presence of spectacles and contact lenses     Wears eyeglasses    Shortness of breath 06/30/2022    SOB (shortness of breath) on exertion    Spinal stenosis, lumbar region without neurogenic claudication 02/15/2022    Lumbar canal stenosis    Unilateral primary osteoarthritis, left knee 05/07/2020    Primary osteoarthritis of left knee    Unspecified disorder of refraction 03/10/2022    Refractive error    Unspecified epiphora, unspecified side     Eye tearing    Urinary tract infection, site not specified 12/19/2017    Recurrent UTI       Current Outpatient Medications on File Prior to Visit   Medication Sig Dispense Refill    apixaban (Eliquis) 2.5 mg tablet Take 1 tablet (2.5 mg) by mouth 2 times a day. 180 tablet 3    calcium phosphate-vitamin D3 250 mg-12.5 mcg (500 unit) tablet,chewable Chew 1 tablet once daily.      cholecalciferol (Vitamin D-3) 50 mcg (2,000 unit) capsule Take 1 capsule (50 mcg) by mouth once daily.      denosumab (Prolia) 60 mg/mL syringe 60 mg subq every 6 months 1 mL 3    diclofenac sodium (Voltaren) 1 % gel gel Apply topically 3 times a day as needed.      esomeprazole (NexIUM) 20 mg DR capsule Take 1 capsule (20 mg) by mouth once  daily.      estradiol (Estrace) 0.01 % (0.1 mg/gram) vaginal cream Place a dime sized amount 0.5g vaginally nightly 3 times a week. 42.5 g 11    flecainide (Tambocor) 50 mg tablet Take 1 tablet (50 mg) by mouth 2 times a day. 180 tablet 3    fluconazole (Diflucan) 150 mg tablet Take 1 tablet by mouth now and repeat in 3 days. 2 tablet 0    fosfomycin (Monurol) 3 gram packet Take 3 g by mouth 1 (one) time per week. 36 g 0    furosemide (Lasix) 40 mg tablet Take 1 tablet (40 mg) by mouth once daily. PRN swelling      losartan (Cozaar) 100 mg tablet Take 1 tablet (100 mg) by mouth once daily. 90 tablet 3    lubricating eye drops ophthalmic solution 1 drop if needed for dry eyes.      metoprolol tartrate (Lopressor) 25 mg tablet Take 1 tablet (25 mg) by mouth 2 times a day. 180 tablet 3    rosuvastatin (Crestor) 40 mg tablet Take 1 tablet (40 mg) by mouth once daily. 90 tablet 3     Current Facility-Administered Medications on File Prior to Visit   Medication Dose Route Frequency Provider Last Rate Last Admin    [COMPLETED] lidocaine (Xylocaine) 10 mg/mL (1 %) injection 4 mL  4 mL injection Once PRN Adam Lamb MD   4 mL at 07/16/25 1330    [COMPLETED] lidocaine (Xylocaine) 10 mg/mL (1 %) injection 4 mL  4 mL injection Once PRN Adam Lamb MD   4 mL at 07/16/25 1330    [COMPLETED] triamcinolone acetonide (Kenalog-40) injection 1 mL  1 mL intra-articular Once PRN Adam Lamb MD   1 mL at 07/16/25 1330    [COMPLETED] triamcinolone acetonide (Kenalog-40) injection 1 mL  1 mL intra-articular Once PRN Adam Lamb MD   1 mL at 07/16/25 1330         RELEVANT LAB RESULTS  Lab Results   Component Value Date    BILITOT 0.4 05/06/2025    CALCIUM 9.3 05/06/2025    CO2 23 05/06/2025     05/06/2025    CREATININE 1.35 (H) 05/06/2025    GLUCOSE 118 (H) 05/06/2025    ALKPHOS 59 05/06/2025    K 4.1 05/06/2025    PROT 6.6 05/06/2025     05/06/2025    AST 18 05/06/2025    ALT 17 05/06/2025     "BUN 31 (H) 05/06/2025    ANIONGAP 14 05/06/2025    MG 2.00 03/17/2023    PHOS 3.8 02/15/2024    ALBUMIN 4.2 05/06/2025    GFRF 58 (A) 07/19/2023     Lab Results   Component Value Date    TRIG 237 (H) 05/06/2025    CHOL 173 05/06/2025    LDLCALC 88 05/06/2025    HDL 52 05/06/2025     No results found for: \"BMCBC\", \"CBCDIF\"     PHARMACEUTICAL ASSESSMENT    MEDICATION RECONCILIATION    Drug Interactions? No    Medication Documentation Review Audit       Reviewed by Octavia Peng on 07/16/25 at 1250      Medication Order Taking? Sig Documenting Provider Last Dose Status   apixaban (Eliquis) 2.5 mg tablet [id number]  Take 1 tablet (2.5 mg) by mouth 2 times a day. Afua Best, APRN-CNP  Active   calcium phosphate-vitamin D3 250 mg-12.5 mcg (500 unit) tablet,chewable [id number] No Chew 1 tablet once daily. Historical Provider, MD Taking Active   cholecalciferol (Vitamin D-3) 50 mcg (2,000 unit) capsule [id number] No Take 1 capsule (50 mcg) by mouth once daily. Historical Provider, MD Taking Active   denosumab (Prolia) 60 mg/mL syringe [id number] No 60 mg subq every 6 months Christopher D'Amico, DO Taking Active   diclofenac sodium (Voltaren) 1 % gel gel [id number] No Apply topically 3 times a day as needed. Historical Provider, MD Taking Active   esomeprazole (NexIUM) 20 mg DR capsule [id number] No Take 1 capsule (20 mg) by mouth once daily. Historical Provider, MD Taking Active   estradiol (Estrace) 0.01 % (0.1 mg/gram) vaginal cream [id number]  Place a dime sized amount 0.5g vaginally nightly 3 times a week. Latoya Mcfarland MD  Active   flecainide (Tambocor) 50 mg tablet [id number]  Take 1 tablet (50 mg) by mouth 2 times a day. Sebastian White MD  Active   fluconazole (Diflucan) 150 mg tablet [id number]  Take 1 tablet by mouth now and repeat in 3 days. Guevara Sainz MD  Active   fosfomycin (Monurol) 3 gram packet [id number]  Take 3 g by mouth 1 (one) time per week. Latoya Mcfarland MD  Active   furosemide (Lasix) 40 mg tablet " [id number] No Take 1 tablet (40 mg) by mouth once daily. PRN swelling Historical Provider, MD Taking Active   losartan (Cozaar) 100 mg tablet [id number] No Take 1 tablet (100 mg) by mouth once daily. Balwinder Lawler MD Taking  [phone]   lubricating eye drops ophthalmic solution [id number] No 1 drop if needed for dry eyes. Historical Provider, MD Taking Active   metoprolol tartrate (Lopressor) 25 mg tablet [id number]  Take 1 tablet (25 mg) by mouth 2 times a day. Sebastian White MD  Active   rosuvastatin (Crestor) 40 mg tablet [id number]  Take 1 tablet (40 mg) by mouth once daily. Balwinder Lawler MD  Active                    DISEASE MANAGEMENT ASSESSMENT:     ANTICOAGULATION ASSESSMENT    The ASCVD Risk score (Thomas DK, et al., 2019) failed to calculate for the following reasons:    The 2019 ASCVD risk score is only valid for ages 40 to 79    DIAGNOSIS: prevention of nonvalvular atrial fibrilliation stroke and systemic embolism  - Patient is projected to be on anticoagulation indefinitely  - [id number] Score: [6] (only included if diagnosis is atrial fibrillation)   Age: [<65 (0)] [65-74 (+1)] [> 75 (+2)]: 2  Sex: [Male/Female (+1)]: 1  CHF history: [No/Yes(+1)]: 1  Hypertension history: [No/Yes(+1)]: 1  Stroke/TIA/thromboembolism history: [No/Yes(+2)]: 0  Vascular disease history (prior MI, peripheral artery disease, aortic plaque): [No/Yes(+1)]: 0  Diabetes history: [No/Yes(+1)]: 1    CURRENT PHARMACOTHERAPY:    Eliquis 2.5mg twice daily  85yo  86.6kg  Scr 1.35 (2025)    Provider prefers lower dose due to kidney function     RELEVANT PAST MEDICAL HISTORY:   Afib, HTN, CHF, HLD    Affordability/Accessibility:  PAP   Adherence/Organization: reports adherence, uses pillbox  Adverse Reactions: none reported  Recent Hospitalizations: none  Recent Falls/Trauma: none reported  Changes in Tobacco or Alcohol Intake:   Tobacco: does not use  Alcohol: seldom    EDUCATION/COUNSELING:   - Counseled patient  on MOA, expectations, duration of therapy, contraindications, administration, and monitoring parameters  - Counseled patient of side effects that are indicative of bleeding such as dark tarry stool, unexplainable bruising, or vomiting up a coffee ground like substance  -Counseled patient on the importance of going to the ER/seeing a provider after falls or physical trauma.     CHF ASSESSMENT     Symptom/Staging:  -Most recent ejection fraction: 55-60%  -NYHA Stage: unknown    Results for orders placed in visit on 04/12/22    Echocardiogram    Narrative  [facility], [address] [place] [address]  Tel [phone] and Fax [phone]    TRANSTHORACIC ECHOCARDIOGRAM REPORT      Patient Name:     DANETTE TAVO COHEN Reading Physician:   [id number] Balwinder Perez MD  Study Date:       4/12/2022          Referring Physician: BALWINDER PEREZ  MRN/PID:          [id number]           PCP:  Accession/Order#: [id number]       Department Location: [facility]  YOB: 1940          Fellow:  Gender:           F                  Nurse:  Admit Date:                          Sonographer:         [facility]  Admission Status: Outpatient         Additional Staff:  Height:           152.40 cm          CC Report to:  Weight:           92.99 kg           Study Type:          Echocardiogram  BSA:              1.89 m2  Blood Pressure: 156 /71 mmHg    Diagnosis/ICD: I49.9-Cardiac arrhythmia, unspecified; R01.1-Cardiac murmur,  unspecified  Indication:    Cardiac arrhythmia; Systolic murmur  Procedure/CPT: Echo Complete w Full [id number]    Patient History:  Pertinent History: Edema; HTN; SOB; DM II; PPM; chronic diastolic heart failure;  dyspnea; DLD; ALEXANDER; arrhythmia; obesity; systolic murmur.    Study Detail: The following Echo studies were performed: 2D, M-Mode, Doppler and  color flow. Technically challenging study due to body habitus.      PHYSICIAN INTERPRETATION:  Left Ventricle: The left  ventricular systolic function is normal, with an estimated ejection fraction of 55-60%. The calculated ejection fraction is normal at 66 % using the Mcnair's Bi-plane MOD calculation. There are no regional wall motion abnormalities. The left ventricular cavity size is normal. Abnormal (paradoxical) septal motion, consistent with RV pacemaker. Spectral Doppler shows a normal pattern of left ventricular diastolic filling. There is mild hypokinesis and dyssynchrony of the anteroseptal wall.  Left Atrium: The left atrium is mild to moderately dilated.  Right Ventricle: The right ventricle is normal in size. There is normal right ventriclar wall thickness. There is normal right ventricular global systolic function. A device is visualized in the right ventricle.  Right Atrium: The right atrium is normal in size. There is a device visualized in the right atrium.  Aortic Valve: The aortic valve appears structurally normal. The aortic valve appears tricuspid and non-restricted. There is no evidence of aortic valve regurgitation. The peak instantaneous gradient of the aortic valve is 20.3 mmHg. The mean gradient of the aortic valve is 12.0 mmHg.  Mitral Valve: The mitral valve is normal in structure. There is normal mitral valve leaflet mobility. There is mild mitral annular calcification. There is mild mitral valve regurgitation.  Tricuspid Valve: The tricuspid valve is structurally normal. There is normal tricuspid valve leaflet mobility. There is mild to moderate tricuspid regurgitation. The Doppler estimated RVSP is mildly elevated at 44.9 mmHg.  Pulmonic Valve: The pulmonic valve is structurally normal. There is trace pulmonic valve regurgitation.  Pericardium: There is no pericardial effusion noted.  Aorta: The aortic root is normal. The Asc Ao is 2.70 cm. There is no dilatation of the aortic arch. There is no dilatation of the ascending aorta. There is no dilatation of the aortic root. There is plaque visualized in  the ascending aorta, which is classified as a Grade 2 [mild (focal or diffuse) intimal thickening of 2-3 mm] atherosclerosis.  Pulmonary Artery: The pulmonary artery is normal in size. The tricuspid regurgitant velocity is 3.24 m/s, and with an estimated right atrial pressure of 3 mmHg, the estimated pulmonary artery pressure is mildly elevated with the RVSP at 41.6 mmHg. The estimated PASP is 45 mmHg.      CONCLUSIONS:  1. The left ventricular systolic function is normal with a 55-60% estimated ejection fraction.  2. Abnormal septal motion consistent with RV pacemaker.  3. There is mild hypokinesis and dyssynchrony of the anteroseptal wall.  4. The left atrium is mild to moderately dilated.  5. Mildly elevated RVSP.  6. There is mild to moderate tricuspid regurgitation.  7. The estimated PASP is 45 mmHg.  8. There is plaque visualized in the ascending aorta.    QUANTITATIVE DATA SUMMARY:  2D MEASUREMENTS:  Normal Ranges:  Ao Root d:     2.80 cm   (2.0-3.7cm)  LAs:           4.27 cm   (2.7-4.0cm)  IVSd:          1.27 cm   (0.6-1.1cm)  LVPWd:         0.88 cm   (0.6-1.1cm)  LVIDd:         4.56 cm   (3.9-5.9cm)  LVIDs:         3.28 cm  LV Mass Index: 91.6 g/m2  LV % FS        28.2 %    LA VOLUME:  Normal Ranges:  LA Vol A4C:       47.7 ml    (22+/-6mL/m2)  LA Vol A2C:       46.4 ml  LA Vol BP:        49.8 ml  LA Vol Index A4C: 25.3ml/m2  LA Vol Index A2C: 24.6 ml/m2  LA Vol Index BP:  26.4 ml/m2  LA Volume Index:  26.3 ml/m2  LA Vol A4C:       43.8 ml  LA Vol A2C:       44.6 ml    RA VOLUME BY A/L METHOD:  Normal Ranges:  RA Area A4C: 14.2 cm2    AORTA MEASUREMENTS:  Normal Ranges:  Asc Ao, d: 2.70 cm (2.1-3.4cm)    LV SYSTOLIC FUNCTION BY 2D PLANIMETRY (MOD):  Normal Ranges:  EF-A4C View: 69.6 % (>55%)  EF-A2C View: 62.6 %  EF-Biplane:  66.4 %    LV DIASTOLIC FUNCTION:  Normal Ranges:  MV Peak E:      1.14 m/s    (0.7-1.2 m/s)  MV Peak A:      0.85 m/s    (0.42-0.7 m/s)  E/A Ratio:      1.34        (1.0-2.2)  MV e'            0.07 m/s    (>8.0)  MV A Dur:       129.40 msec  E/e' Ratio:     16.34       (<8.0)  MV DT:          205 msec    (150-240 msec)  PulmV Sys [place]:  76.29 cm/s  PulmV Mao [place]: 62.44 cm/s  PulmV S/D [place]:  1.22    MITRAL VALVE:  Normal Ranges:  MV DT: 205 msec (150-240msec)    AORTIC VALVE:  Normal Ranges:  AoV Vmax:                2.25 m/s  (<1.7m/s)  AoV Peak P.3 mmHg (<20mmHg)  AoV Mean P.0 mmHg (1.7-11.5mmHg)  LVOT Max [place]:            1.30 m/s  (<1.1m/s)  AoV VTI:                 52.62 cm  (18-25cm)  LVOT VTI:                31.20 cm  LVOT Diameter:           1.77 cm   (1.8-2.4cm)  AoV Area, VTI:           1.46 cm2  (2.5-5.5cm2)  AoV Area,Vmax:           1.42 cm2  (2.5-4.5cm2)  AoV Dimensionless Index: 0.59    RIGHT VENTRICLE:  RV 1   3.6 cm  RV 2   2.8 cm  RV 3   5.5 cm  TAPSE: 25.0 mm  RV s'  0.12 m/s    TRICUSPID VALVE/RVSP:  Normal Ranges:  Peak TR Velocity: 3.24 m/s  RV Syst Pressure: 44.9 mmHg (< 30mmHg)  IVC Diam:         1.40 cm    PULMONIC VALVE:  Normal Ranges:  PV Max [place]: 1.0 m/s  (0.6-0.9m/s)  PV Max PG:  3.8 mmHg    Pulmonary Veins:  PulmV Mao [place]: 62.44 cm/s  PulmV S/D [place]:  1.22  PulmV Sys [place]:  76.29 cm/s      [id number] Balwinder Lawler MD  Electronically signed on 2022 at 9:52:01 AM         Final       Guideline-Directed Medical Therapy:  -ARNI: No   -If no, then ACEi/ARB?: Yes, describe: [id number] daily  -Beta Blocker: Yes, describe: Metoprolol tartrate 25mg twice daily  -MRA: No  -SGLT2i: No- recurrent UTIs    Secondary Prevention:  -The ASCVD Risk score (Thomas DK, et al., 2019) failed to calculate for the following reasons:    The 2019 ASCVD risk score is only valid for ages 40 to 79   -Aspirin 81mg? yes  -Statin?: Yes, describe: Rosuvastatin 40mg daily  -HTN?: Yes    CURRENT PHARMACOTHERAPY:   Losartan 100mg daily  Metoprolol tartrate 25mg twice daily  Furosemide 40mg daily PRN     Affordability:  PAP   Adherence/Compliance: reports  adherence  Adverse Effects: reports dizziness/lightheadedness when changing positions (sitting to standing, laying to sitting)    Monitoring Weights at Home: No, I recommend patient starting taking daily weight since she endorses swelling  Home Weight Recordings: unable to assess    Past In Office Weight Readings:   Wt Readings from Last 6 Encounters:   06/24/25 86.6 kg (191 lb)   06/13/25 86.6 kg (191 lb)   05/06/25 84.8 kg (187 lb)   04/14/25 83 kg (183 lb)   03/21/25 86.2 kg (190 lb)   03/12/25 86.2 kg (190 lb)       Monitoring Blood Pressure at Home: No  Home BP Recordings: unable to assess     Past In Office BP Readings:   BP Readings from Last 6 Encounters:   06/24/25 117/79   05/06/25 128/72   04/14/25 119/77   03/27/25 130/62   02/06/25 122/66   01/24/25 (!) 141/49       HEALTH MANAGEMENT    Maintaining fluid restriction (<2 L/day): No  Edema/swelling: Yes -has increased since stopped jardiance, I recommend patient start taking furosemide PRN  Shortness of breath: Yes on exertion, walking from kitchen to living room  Trouble sleeping/lying down: No  Dry/hacking cough: No  Recent Hospitalizations: No    EDUCATION/COUNSELING:   - Counseled patient on MOA, expectations, duration of therapy, contraindications, administration, and monitoring parameters  - Counseled patient on lifestyle modifications that can decrease your risk of having complications (smoking cessation, losing weight, daily weights, vaccines)  - Counseled patient on fluid intake and weight management. Recommended to not consume more than 2 liters of fliuids per day. If they have gained more than 2-3 pounds within a 24 hours period (or 5 pounds in a week), contact their cardiologist  - Answered all patient questions and concerns     DISCUSSION/NOTES:   Patient reports doing well on Eliquis. She reports adherence, no adverse effects and denies s/s of bleeding.  Patient confirms stopping Jardiance due to recurring UTIs. Patient states since stopping  Jardiance she is retaining more water/has more swelling. Patient took furosemide once since stopping Jardiance. I recommend patient take furosemide as needed rather than sparingly when she notices swelling/water retention. I also recommend patient take daily weights to monitor for acute weight fluctuation, patient verbalized understanding.   Patient reports tolerating losartan and metoprolol, she reports occasional dizziness when switching positions but no falls. Other than increased swelling patient denies s/s of worsening heart failure.      ASSESSMENT AND PLAN:    Assessment/Plan   Problem List Items Addressed This Visit       Chronic diastolic congestive heart failure    Patient currently on 2 of 4 GDMT medications. Unable to assess home BP. Renal function and potassium level appropriate for current regimen. I recommend patient take furosemide PRN for swelling/water retention since she is no longer taking Jardiance.    Labs (05/06/2025): K-4.1 Scr-1.35 eGFR-38         Relevant Orders    Referral to Clinical Pharmacy    Cardiac arrhythmia - Primary    Patient age, weight and renal function appropriate for Eliquis 5mg twice daily, however per provider patient needs lower dose due to kidney function (Scr 1.35 05/06/2025). Patient is 85yo and 86.6kg.         Relevant Orders    Referral to Clinical Pharmacy       RECOMMENDATIONS/PLAN    CONTINUE  Eliquis 2.5mg twice daily (Provider prefers lower dose due to kidney function)   Losartan 100mg daily  Metoprolol tartrate 25mg twice daily  Furosemide 40mg PRN    Last Appnt with Referring Provider: 06/24/2025  Next Appnt with Referring Provider: 11/25/2025  Clinical Pharmacist follow up: 6 weeks  VAF/Application Expiration: Yes    Date: 06/20/2026  Type of Encounter: Leeann Bob PharmD    Verbal consent to manage patient's drug therapy was obtained from the patient . They were informed they may decline to participate or withdraw from participation in  pharmacy services at any time.    Continue all meds under the continuation of care with the referring provider and clinical pharmacy team.

## 2025-07-17 NOTE — ASSESSMENT & PLAN NOTE
Patient currently on 2 of 4 GDMT medications. Unable to assess home BP. Renal function and potassium level appropriate for current regimen. I recommend patient take furosemide PRN for swelling/water retention since she is no longer taking Jardiance.    Labs (05/06/2025): K-4.1 Scr-1.35 eGFR-38

## 2025-07-21 DIAGNOSIS — I10 HYPERTENSION, UNSPECIFIED TYPE: ICD-10-CM

## 2025-07-21 RX ORDER — LOSARTAN POTASSIUM 100 MG/1
100 TABLET ORAL DAILY
Qty: 90 TABLET | Refills: 3 | Status: SHIPPED | OUTPATIENT
Start: 2025-07-21

## 2025-08-06 ENCOUNTER — TELEPHONE (OUTPATIENT)
Dept: PRIMARY CARE | Facility: CLINIC | Age: 85
End: 2025-08-06
Payer: MEDICARE

## 2025-08-06 NOTE — TELEPHONE ENCOUNTER
Patient has follow-up on 8/12/wants to know if she needs to have labs drawn, if so she will go to [address].

## 2025-08-07 DIAGNOSIS — D72.829 LEUKOCYTOSIS, UNSPECIFIED TYPE: ICD-10-CM

## 2025-08-07 DIAGNOSIS — J45.909 ASTHMA, UNSPECIFIED ASTHMA SEVERITY, UNSPECIFIED WHETHER COMPLICATED, UNSPECIFIED WHETHER PERSISTENT (HHS-HCC): ICD-10-CM

## 2025-08-07 DIAGNOSIS — N18.31 STAGE 3A CHRONIC KIDNEY DISEASE (MULTI): ICD-10-CM

## 2025-08-07 DIAGNOSIS — M81.0 OSTEOPOROSIS, UNSPECIFIED OSTEOPOROSIS TYPE, UNSPECIFIED PATHOLOGICAL FRACTURE PRESENCE: ICD-10-CM

## 2025-08-07 DIAGNOSIS — I50.32 CHRONIC DIASTOLIC CONGESTIVE HEART FAILURE: ICD-10-CM

## 2025-08-07 DIAGNOSIS — E11.59 TYPE 2 DIABETES MELLITUS WITH OTHER CIRCULATORY COMPLICATIONS: Primary | ICD-10-CM

## 2025-08-07 DIAGNOSIS — I48.0 PAROXYSMAL ATRIAL FIBRILLATION (MULTI): ICD-10-CM

## 2025-08-07 DIAGNOSIS — E78.5 HYPERLIPIDEMIA, UNSPECIFIED HYPERLIPIDEMIA TYPE: ICD-10-CM

## 2025-08-08 ENCOUNTER — OFFICE VISIT (OUTPATIENT)
Dept: UROLOGY | Facility: CLINIC | Age: 85
End: 2025-08-08
Payer: MEDICARE

## 2025-08-08 DIAGNOSIS — N39.0 RECURRENT UTI: Primary | ICD-10-CM

## 2025-08-08 LAB
POC APPEARANCE, URINE: ABNORMAL
POC BILIRUBIN, URINE: NEGATIVE
POC BLOOD, URINE: ABNORMAL
POC COLOR, URINE: YELLOW
POC GLUCOSE, URINE: NEGATIVE MG/DL
POC KETONES, URINE: NEGATIVE MG/DL
POC LEUKOCYTES, URINE: ABNORMAL
POC NITRITE,URINE: POSITIVE
POC PH, URINE: 6 PH
POC PROTEIN, URINE: ABNORMAL MG/DL
POC SPECIFIC GRAVITY, URINE: >=1.03
POC UROBILINOGEN, URINE: 0.2 EU/DL

## 2025-08-08 PROCEDURE — 99214 OFFICE O/P EST MOD 30 MIN: CPT | Performed by: STUDENT IN AN ORGANIZED HEALTH CARE EDUCATION/TRAINING PROGRAM

## 2025-08-08 PROCEDURE — 1036F TOBACCO NON-USER: CPT | Performed by: STUDENT IN AN ORGANIZED HEALTH CARE EDUCATION/TRAINING PROGRAM

## 2025-08-08 PROCEDURE — 1159F MED LIST DOCD IN RCRD: CPT | Performed by: STUDENT IN AN ORGANIZED HEALTH CARE EDUCATION/TRAINING PROGRAM

## 2025-08-08 PROCEDURE — 81002 URINALYSIS NONAUTO W/O SCOPE: CPT | Performed by: STUDENT IN AN ORGANIZED HEALTH CARE EDUCATION/TRAINING PROGRAM

## 2025-08-08 PROCEDURE — 1126F AMNT PAIN NOTED NONE PRSNT: CPT | Performed by: STUDENT IN AN ORGANIZED HEALTH CARE EDUCATION/TRAINING PROGRAM

## 2025-08-08 RX ORDER — NITROFURANTOIN 25; 75 MG/1; MG/1
100 CAPSULE ORAL 2 TIMES DAILY
Qty: 10 CAPSULE | Refills: 0 | Status: SHIPPED | OUTPATIENT
Start: 2025-08-08 | End: 2025-08-13

## 2025-08-08 ASSESSMENT — PAIN SCALES - GENERAL: PAINLEVEL_OUTOF10: 0-NO PAIN

## 2025-08-08 ASSESSMENT — ENCOUNTER SYMPTOMS: OCCASIONAL FEELINGS OF UNSTEADINESS: 1

## 2025-08-08 NOTE — PROGRESS NOTES
OBGYN Annual Visit    Marcelle Hewitt is a 84 y.o. presenting for rUTIs    HPI  Has been taking vaginal estrogen since June, but continues to have frequency and dysuria intermittently. She will wake up 6 times a night,.     Positive urine cx: 11/14/24, 3/21/25 (despite vaginal estrogen), 5/6/25 (despite vaginal estrogen+Hiprex).     Had previously been taking a SGLT2 inhibitor which she discontinued a month ago due to the recurrent UTIs after 3 years of use, which was okay'd by her cardiologist.     PMH: HTN on losartan, lasix, and afibon metorolol, flecanide, eliquis, HLD rosuvastatin, osteoporosis on denosumab, GERD on nexium      OB History   No obstetric history on file.       Medical History[1]    Surgical History[2]    Family History[3]    Social History     Socioeconomic History    Marital status:      Spouse name: Not on file    Number of children: Not on file    Years of education: Not on file    Highest education level: Not on file   Occupational History    Not on file   Tobacco Use    Smoking status: Never    Smokeless tobacco: Never   Vaping Use    Vaping status: Never Used   Substance and Sexual Activity    Alcohol use: Never    Drug use: Never    Sexual activity: Not on file   Other Topics Concern    Not on file   Social History Narrative    Not on file     Social Drivers of Health     Financial Resource Strain: Not on file   Food Insecurity: Not on file   Transportation Needs: Not on file   Physical Activity: Not on file   Stress: Not on file   Social Connections: Not on file   Intimate Partner Violence: Not on file   Housing Stability: Not on file         Objective   There were no vitals taken for this visit.      General:   Alert and oriented, in no acute distress   Neck: Supple. No visible thyromegaly.                                    Psych Normal affect. Normal mood.      ASSESSEMENT AND PLAN    Alexia  - Positive urine cx: 11/14/24, 3/21/25 (despite vaginal estrogen), 5/6/25 (despite  vaginal estrogen+Hiprex).   - now has discontinued Jardiance which was approved by her cardiologist for discontinuation, should decrease risks of rUTIs  - Udip positive nitrites and blood and large leukocytes, rx sent for macrobid  - recommended continuation of fosfomycin, vaginal estrogen and initiating D Mannose, information given  - Ucx sent, will follow up  - FUV in 3 months    Elevated Creatinine  - patient will schedule visit with nephrologist      Fam Pereyra, PGY5  Female [facility] and Sexual [facility] Fellow  Departments of OBGYN & Urology             [1]   Past Medical History:  Diagnosis Date    Abnormal finding of blood chemistry, unspecified 07/08/2022    Abnormal blood chemistry    Anesthesia of skin 11/12/2020    Numbness of foot    Bilateral primary osteoarthritis of hip 03/01/2021    Osteoarthritis, hip, bilateral    Dermatochalasis of unspecified eye, unspecified eyelid 05/06/2019    Dermatochalasis    Dry eye syndrome of bilateral lacrimal glands 03/10/2022    Dry eyes    Dry eye syndrome of unspecified lacrimal gland 04/29/2016    Dry eye syndrome    Encounter for prophylactic measures, unspecified 11/12/2020    Need for prophylactic measure    Encounter for screening mammogram for malignant neoplasm of breast     Visit for screening mammogram    Headache, unspecified 07/02/2021    Morning headache    History of falling 07/12/2017    History of fall    Keratoconjunctivitis sicca, not specified as Sjogren's, bilateral 03/10/2022    Keratoconjunctivitis sicca of both eyes not due to Sjogren's syndrome    Meibomian gland dysfunction left eye, upper and lower eyelids 03/10/2022    Meibomian gland dysfunction (MGD) of upper and lower eyelid of left eye    Meibomian gland dysfunction right eye, upper and lower eyelids 03/10/2022    Meibomian gland dysfunction (MGD) of upper and lower eyelid of right eye    Menopausal and female climacteric states 04/29/2016    Postmenopausal disorder    Morbid  (severe) obesity due to excess calories (Multi) 07/08/2022    Class 2 severe obesity with serious comorbidity in adult    Myopia, bilateral 03/10/2022    Myopia with presbyopia of both eyes    Myopia, right eye 03/10/2022    Myopia of right eye    Nonexudative age-related macular degeneration, bilateral, early dry stage 05/06/2019    Early dry stage nonexudative age-related macular degeneration of both eyes    Obesity, unspecified 12/02/2020    Obesity (BMI 30-39.9)    Obstructive sleep apnea (adult) (pediatric) 09/21/2017    ALEXANDER on CPAP    Other abnormalities of breathing 12/20/2021    Difficulty breathing    Other conditions influencing health status     DEXA Body Composition Study    Other conditions influencing health status     History of pregnancy    Other fracture of upper and lower end of left fibula, subsequent encounter for closed fracture with routine healing 06/04/2019    Closed fracture of distal end of left fibula with routine healing, unspecified fracture morphology, subsequent encounter    Other headache syndrome 07/13/2016    Other headache syndrome    Other secondary cataract, unspecified eye     PCO (posterior capsular opacification)    Other specified abnormal findings of blood chemistry 04/08/2020    Elevated serum creatinine    Other symptoms and signs involving the musculoskeletal system 09/08/2021    Weakness of both lower extremities    Other vitreous opacities, unspecified eye     Floaters    Pain in leg, unspecified 11/13/2019    Leg pain    Pain in unspecified limb 09/14/2017    Limb pain    Personal history of diseases of the skin and subcutaneous tissue 04/11/2019    History of cellulitis    Personal history of other diseases of the musculoskeletal system and connective tissue 07/25/2017    History of neck pain    Personal history of other diseases of the musculoskeletal system and connective tissue 07/12/2017    History of muscle spasm    Personal history of other diseases of the  musculoskeletal system and connective tissue 06/27/2019    History of osteoporosis    Personal history of other diseases of the musculoskeletal system and connective tissue 03/02/2021    History of spinal stenosis    Personal history of other diseases of the nervous system and sense organs 03/10/2022    History of blepharitis    Personal history of other diseases of the nervous system and sense organs 11/16/2017    History of sciatica    Personal history of other diseases of the nervous system and sense organs 04/17/2019    History of iritis    Personal history of other drug therapy 10/06/2021    History of influenza vaccination    Personal history of other medical treatment 02/22/2021    History of screening mammography    Personal history of other specified conditions 10/06/2021    History of excessive sweating    Personal history of other specified conditions 02/15/2022    History of headache    Personal history of other specified conditions 12/08/2020    History of shortness of breath    Personal history of other specified conditions 12/19/2017    History of urinary frequency    Personal history of other specified conditions 04/03/2018    History of edema    Personal history of other specified conditions 07/08/2022    History of edema    Personal history of other specified conditions 04/12/2019    History of chronic pain    Personal history of urinary (tract) infections 07/15/2021    History of urinary tract infection    Polyosteoarthritis, unspecified 11/12/2020    Osteoarthritis of multiple joints    Presence of intraocular lens     Presence of artificial intra-ocular lens    Presence of intraocular lens 03/10/2022    Pseudophakia of both eyes    Presence of spectacles and contact lenses     Wears eyeglasses    Shortness of breath 06/30/2022    SOB (shortness of breath) on exertion    Spinal stenosis, lumbar region without neurogenic claudication 02/15/2022    Lumbar canal stenosis    Unilateral primary  osteoarthritis, left knee 05/07/2020    Primary osteoarthritis of left knee    Unspecified disorder of refraction 03/10/2022    Refractive error    Unspecified epiphora, unspecified side     Eye tearing    Urinary tract infection, site not specified 12/19/2017    Recurrent UTI   [2]   Past Surgical History:  Procedure Laterality Date    CARDIAC PACEMAKER PLACEMENT  09/17/2021    Pacemaker Permanent Placement    CATARACT EXTRACTION  11/15/2013    Cataract Surgery    OTHER SURGICAL HISTORY  06/04/2019    Tonsillectomy   [3]   Family History  Problem Relation Name Age of Onset    Heart disease Mother      Sleep apnea Mother      Heart disease Father      Sleep apnea Father      Breast cancer Sister      Diabetes Sister      Hypertension Sister      Multiple sclerosis Sister      Strabismus Child

## 2025-08-09 LAB
ALBUMIN SERPL-MCNC: 3.9 G/DL (ref 3.6–5.1)
ALP SERPL-CCNC: 59 U/L (ref 37–153)
ALT SERPL-CCNC: 11 U/L (ref 6–29)
ANION GAP SERPL CALCULATED.4IONS-SCNC: 11 MMOL/L (CALC) (ref 7–17)
AST SERPL-CCNC: 15 U/L (ref 10–35)
BASOPHILS # BLD AUTO: 41 CELLS/UL (ref 0–200)
BASOPHILS NFR BLD AUTO: 0.4 %
BILIRUB SERPL-MCNC: 0.4 MG/DL (ref 0.2–1.2)
BUN SERPL-MCNC: 28 MG/DL (ref 7–25)
CALCIUM SERPL-MCNC: 8.9 MG/DL (ref 8.6–10.4)
CHLORIDE SERPL-SCNC: 107 MMOL/L (ref 98–110)
CHOLEST SERPL-MCNC: 162 MG/DL
CHOLEST/HDLC SERPL: 2.9 (CALC)
CO2 SERPL-SCNC: 24 MMOL/L (ref 20–32)
CREAT SERPL-MCNC: 1.06 MG/DL (ref 0.6–0.95)
EGFRCR SERPLBLD CKD-EPI 2021: 51 ML/MIN/1.73M2
EOSINOPHIL # BLD AUTO: 173 CELLS/UL (ref 15–500)
EOSINOPHIL NFR BLD AUTO: 1.7 %
ERYTHROCYTE [DISTWIDTH] IN BLOOD BY AUTOMATED COUNT: 14.7 % (ref 11–15)
EST. AVERAGE GLUCOSE BLD GHB EST-MCNC: 137 MG/DL
EST. AVERAGE GLUCOSE BLD GHB EST-SCNC: 7.6 MMOL/L
GLUCOSE SERPL-MCNC: 105 MG/DL (ref 65–99)
HBA1C MFR BLD: 6.4 %
HCT VFR BLD AUTO: 39.1 % (ref 35–45)
HDLC SERPL-MCNC: 55 MG/DL
HGB BLD-MCNC: 12.4 G/DL (ref 11.7–15.5)
LDLC SERPL CALC-MCNC: 84 MG/DL (CALC)
LYMPHOCYTES # BLD AUTO: 3448 CELLS/UL (ref 850–3900)
LYMPHOCYTES NFR BLD AUTO: 33.8 %
MCH RBC QN AUTO: 30.3 PG (ref 27–33)
MCHC RBC AUTO-ENTMCNC: 31.7 G/DL (ref 32–36)
MCV RBC AUTO: 95.6 FL (ref 80–100)
MONOCYTES # BLD AUTO: 653 CELLS/UL (ref 200–950)
MONOCYTES NFR BLD AUTO: 6.4 %
NEUTROPHILS # BLD AUTO: 5885 CELLS/UL (ref 1500–7800)
NEUTROPHILS NFR BLD AUTO: 57.7 %
NONHDLC SERPL-MCNC: 107 MG/DL (CALC)
PLATELET # BLD AUTO: 203 THOUSAND/UL (ref 140–400)
PMV BLD REES-ECKER: 10.5 FL (ref 7.5–12.5)
POTASSIUM SERPL-SCNC: 4.6 MMOL/L (ref 3.5–5.3)
PROT SERPL-MCNC: 6.3 G/DL (ref 6.1–8.1)
RBC # BLD AUTO: 4.09 MILLION/UL (ref 3.8–5.1)
SODIUM SERPL-SCNC: 142 MMOL/L (ref 135–146)
TRIGL SERPL-MCNC: 125 MG/DL
WBC # BLD AUTO: 10.2 THOUSAND/UL (ref 3.8–10.8)

## 2025-08-10 LAB
BACTERIA UR CULT: ABNORMAL
BACTERIA UR CULT: ABNORMAL

## 2025-08-11 ENCOUNTER — APPOINTMENT (OUTPATIENT)
Dept: OBSTETRICS AND GYNECOLOGY | Facility: CLINIC | Age: 85
End: 2025-08-11
Payer: MEDICARE

## 2025-08-11 DIAGNOSIS — N39.0 URINARY TRACT INFECTION WITHOUT HEMATURIA, SITE UNSPECIFIED: Primary | ICD-10-CM

## 2025-08-11 RX ORDER — AMOXICILLIN AND CLAVULANATE POTASSIUM 875; 125 MG/1; MG/1
875 TABLET, FILM COATED ORAL 2 TIMES DAILY
Qty: 14 TABLET | Refills: 0 | Status: SHIPPED | OUTPATIENT
Start: 2025-08-11 | End: 2025-08-12 | Stop reason: ALTCHOICE

## 2025-08-12 ENCOUNTER — APPOINTMENT (OUTPATIENT)
Dept: PRIMARY CARE | Facility: CLINIC | Age: 85
End: 2025-08-12
Payer: MEDICARE

## 2025-08-12 VITALS
HEIGHT: 60 IN | WEIGHT: 190 LBS | DIASTOLIC BLOOD PRESSURE: 55 MMHG | OXYGEN SATURATION: 94 % | HEART RATE: 70 BPM | BODY MASS INDEX: 37.3 KG/M2 | SYSTOLIC BLOOD PRESSURE: 141 MMHG

## 2025-08-12 DIAGNOSIS — E11.59 TYPE 2 DIABETES MELLITUS WITH OTHER CIRCULATORY COMPLICATIONS: Primary | ICD-10-CM

## 2025-08-12 DIAGNOSIS — G95.89 LUMBAR EPIDURAL MASS (MULTI): ICD-10-CM

## 2025-08-12 DIAGNOSIS — N39.0 URINARY TRACT INFECTION WITHOUT HEMATURIA, SITE UNSPECIFIED: ICD-10-CM

## 2025-08-12 DIAGNOSIS — G89.29 CHRONIC LOW BACK PAIN, UNSPECIFIED BACK PAIN LATERALITY, UNSPECIFIED WHETHER SCIATICA PRESENT: ICD-10-CM

## 2025-08-12 DIAGNOSIS — Z78.0 ASYMPTOMATIC POSTMENOPAUSAL STATE: ICD-10-CM

## 2025-08-12 DIAGNOSIS — E66.01 OBESITY, MORBID (MULTI): ICD-10-CM

## 2025-08-12 DIAGNOSIS — M81.0 OSTEOPOROSIS, UNSPECIFIED OSTEOPOROSIS TYPE, UNSPECIFIED PATHOLOGICAL FRACTURE PRESENCE: ICD-10-CM

## 2025-08-12 DIAGNOSIS — E78.5 HYPERLIPIDEMIA, UNSPECIFIED HYPERLIPIDEMIA TYPE: ICD-10-CM

## 2025-08-12 DIAGNOSIS — N39.0 RECURRENT UTI: ICD-10-CM

## 2025-08-12 DIAGNOSIS — J45.909 ASTHMA, UNSPECIFIED ASTHMA SEVERITY, UNSPECIFIED WHETHER COMPLICATED, UNSPECIFIED WHETHER PERSISTENT (HHS-HCC): ICD-10-CM

## 2025-08-12 DIAGNOSIS — N18.31 STAGE 3A CHRONIC KIDNEY DISEASE (MULTI): ICD-10-CM

## 2025-08-12 DIAGNOSIS — I48.0 PAROXYSMAL ATRIAL FIBRILLATION (MULTI): ICD-10-CM

## 2025-08-12 DIAGNOSIS — M54.50 CHRONIC LOW BACK PAIN, UNSPECIFIED BACK PAIN LATERALITY, UNSPECIFIED WHETHER SCIATICA PRESENT: ICD-10-CM

## 2025-08-12 DIAGNOSIS — I50.32 CHRONIC DIASTOLIC CONGESTIVE HEART FAILURE: ICD-10-CM

## 2025-08-12 PROBLEM — M31.6 TEMPORAL ARTERITIS (MULTI): Status: RESOLVED | Noted: 2023-07-13 | Resolved: 2025-08-12

## 2025-08-12 PROCEDURE — 1159F MED LIST DOCD IN RCRD: CPT | Performed by: STUDENT IN AN ORGANIZED HEALTH CARE EDUCATION/TRAINING PROGRAM

## 2025-08-12 PROCEDURE — 3077F SYST BP >= 140 MM HG: CPT | Performed by: STUDENT IN AN ORGANIZED HEALTH CARE EDUCATION/TRAINING PROGRAM

## 2025-08-12 PROCEDURE — 99214 OFFICE O/P EST MOD 30 MIN: CPT | Performed by: STUDENT IN AN ORGANIZED HEALTH CARE EDUCATION/TRAINING PROGRAM

## 2025-08-12 PROCEDURE — G0439 PPPS, SUBSEQ VISIT: HCPCS | Performed by: STUDENT IN AN ORGANIZED HEALTH CARE EDUCATION/TRAINING PROGRAM

## 2025-08-12 PROCEDURE — 99397 PER PM REEVAL EST PAT 65+ YR: CPT | Performed by: STUDENT IN AN ORGANIZED HEALTH CARE EDUCATION/TRAINING PROGRAM

## 2025-08-12 PROCEDURE — 1036F TOBACCO NON-USER: CPT | Performed by: STUDENT IN AN ORGANIZED HEALTH CARE EDUCATION/TRAINING PROGRAM

## 2025-08-12 PROCEDURE — 3078F DIAST BP <80 MM HG: CPT | Performed by: STUDENT IN AN ORGANIZED HEALTH CARE EDUCATION/TRAINING PROGRAM

## 2025-08-12 PROCEDURE — 1160F RVW MEDS BY RX/DR IN RCRD: CPT | Performed by: STUDENT IN AN ORGANIZED HEALTH CARE EDUCATION/TRAINING PROGRAM

## 2025-08-12 PROCEDURE — 1170F FXNL STATUS ASSESSED: CPT | Performed by: STUDENT IN AN ORGANIZED HEALTH CARE EDUCATION/TRAINING PROGRAM

## 2025-08-12 RX ORDER — AMOXICILLIN AND CLAVULANATE POTASSIUM 875; 125 MG/1; MG/1
875 TABLET, FILM COATED ORAL 2 TIMES DAILY
Qty: 14 TABLET | Refills: 0 | Status: SHIPPED | OUTPATIENT
Start: 2025-08-12 | End: 2025-08-19

## 2025-08-12 RX ORDER — AMOXICILLIN AND CLAVULANATE POTASSIUM 875; 125 MG/1; MG/1
875 TABLET, FILM COATED ORAL 2 TIMES DAILY
Qty: 14 TABLET | Refills: 0 | Status: SHIPPED | OUTPATIENT
Start: 2025-08-12 | End: 2025-08-12 | Stop reason: SDUPTHER

## 2025-08-12 ASSESSMENT — ACTIVITIES OF DAILY LIVING (ADL)
TAKING_MEDICATION: INDEPENDENT
BATHING: INDEPENDENT
GROCERY_SHOPPING: INDEPENDENT
MANAGING_FINANCES: INDEPENDENT
DOING_HOUSEWORK: INDEPENDENT
DRESSING: INDEPENDENT

## 2025-08-12 ASSESSMENT — ENCOUNTER SYMPTOMS
LOSS OF SENSATION IN FEET: 0
OCCASIONAL FEELINGS OF UNSTEADINESS: 1
DEPRESSION: 0

## 2025-08-15 ENCOUNTER — OFFICE VISIT (OUTPATIENT)
Dept: UROLOGY | Facility: CLINIC | Age: 85
End: 2025-08-15
Payer: MEDICARE

## 2025-08-15 VITALS — TEMPERATURE: 97.3 F

## 2025-08-15 DIAGNOSIS — N39.0 RECURRENT UTI: Primary | ICD-10-CM

## 2025-08-15 LAB
POC APPEARANCE, URINE: CLEAR
POC BILIRUBIN, URINE: NEGATIVE
POC BLOOD, URINE: ABNORMAL
POC COLOR, URINE: YELLOW
POC GLUCOSE, URINE: NEGATIVE MG/DL
POC KETONES, URINE: ABNORMAL MG/DL
POC LEUKOCYTES, URINE: ABNORMAL
POC NITRITE,URINE: NEGATIVE
POC PH, URINE: 6 PH
POC PROTEIN, URINE: ABNORMAL MG/DL
POC SPECIFIC GRAVITY, URINE: 1.02
POC UROBILINOGEN, URINE: 1 EU/DL

## 2025-08-15 PROCEDURE — 81003 URINALYSIS AUTO W/O SCOPE: CPT | Performed by: STUDENT IN AN ORGANIZED HEALTH CARE EDUCATION/TRAINING PROGRAM

## 2025-08-15 PROCEDURE — 1159F MED LIST DOCD IN RCRD: CPT | Performed by: STUDENT IN AN ORGANIZED HEALTH CARE EDUCATION/TRAINING PROGRAM

## 2025-08-15 PROCEDURE — G2211 COMPLEX E/M VISIT ADD ON: HCPCS | Performed by: STUDENT IN AN ORGANIZED HEALTH CARE EDUCATION/TRAINING PROGRAM

## 2025-08-15 PROCEDURE — 1036F TOBACCO NON-USER: CPT | Performed by: STUDENT IN AN ORGANIZED HEALTH CARE EDUCATION/TRAINING PROGRAM

## 2025-08-15 PROCEDURE — 99213 OFFICE O/P EST LOW 20 MIN: CPT | Performed by: STUDENT IN AN ORGANIZED HEALTH CARE EDUCATION/TRAINING PROGRAM

## 2025-08-15 PROCEDURE — 1126F AMNT PAIN NOTED NONE PRSNT: CPT | Performed by: STUDENT IN AN ORGANIZED HEALTH CARE EDUCATION/TRAINING PROGRAM

## 2025-08-15 ASSESSMENT — PAIN SCALES - GENERAL: PAINLEVEL_OUTOF10: 0-NO PAIN

## 2025-08-16 ENCOUNTER — HOSPITAL ENCOUNTER (EMERGENCY)
Facility: HOSPITAL | Age: 85
Discharge: HOME | End: 2025-08-16
Attending: EMERGENCY MEDICINE
Payer: MEDICARE

## 2025-08-16 VITALS
RESPIRATION RATE: 18 BRPM | SYSTOLIC BLOOD PRESSURE: 126 MMHG | DIASTOLIC BLOOD PRESSURE: 64 MMHG | HEART RATE: 73 BPM | TEMPERATURE: 97.8 F | OXYGEN SATURATION: 97 %

## 2025-08-16 DIAGNOSIS — R51.9 ACUTE NONINTRACTABLE HEADACHE, UNSPECIFIED HEADACHE TYPE: Primary | ICD-10-CM

## 2025-08-16 LAB
ANION GAP SERPL CALC-SCNC: 12 MMOL/L (ref 10–20)
BASOPHILS # BLD AUTO: 0.03 X10*3/UL (ref 0–0.1)
BASOPHILS NFR BLD AUTO: 0.3 %
BUN SERPL-MCNC: 17 MG/DL (ref 6–23)
CALCIUM SERPL-MCNC: 8.5 MG/DL (ref 8.6–10.3)
CHLORIDE SERPL-SCNC: 109 MMOL/L (ref 98–107)
CO2 SERPL-SCNC: 24 MMOL/L (ref 21–32)
CREAT SERPL-MCNC: 0.87 MG/DL (ref 0.5–1.05)
EGFRCR SERPLBLD CKD-EPI 2021: 65 ML/MIN/1.73M*2
EOSINOPHIL # BLD AUTO: 0.23 X10*3/UL (ref 0–0.4)
EOSINOPHIL NFR BLD AUTO: 2.6 %
ERYTHROCYTE [DISTWIDTH] IN BLOOD BY AUTOMATED COUNT: 14.2 % (ref 11.5–14.5)
ERYTHROCYTE [SEDIMENTATION RATE] IN BLOOD BY WESTERGREN METHOD: 50 MM/H (ref 0–30)
GLUCOSE SERPL-MCNC: 110 MG/DL (ref 74–99)
HCT VFR BLD AUTO: 36.8 % (ref 36–46)
HGB BLD-MCNC: 11.9 G/DL (ref 12–16)
IMM GRANULOCYTES # BLD AUTO: 0.05 X10*3/UL (ref 0–0.5)
IMM GRANULOCYTES NFR BLD AUTO: 0.6 % (ref 0–0.9)
LYMPHOCYTES # BLD AUTO: 2.35 X10*3/UL (ref 0.8–3)
LYMPHOCYTES NFR BLD AUTO: 27 %
MCH RBC QN AUTO: 30 PG (ref 26–34)
MCHC RBC AUTO-ENTMCNC: 32.3 G/DL (ref 32–36)
MCV RBC AUTO: 93 FL (ref 80–100)
MONOCYTES # BLD AUTO: 0.75 X10*3/UL (ref 0.05–0.8)
MONOCYTES NFR BLD AUTO: 8.6 %
NEUTROPHILS # BLD AUTO: 5.28 X10*3/UL (ref 1.6–5.5)
NEUTROPHILS NFR BLD AUTO: 60.9 %
NRBC BLD-RTO: 0 /100 WBCS (ref 0–0)
PLATELET # BLD AUTO: 217 X10*3/UL (ref 150–450)
POTASSIUM SERPL-SCNC: 3.9 MMOL/L (ref 3.5–5.3)
RBC # BLD AUTO: 3.97 X10*6/UL (ref 4–5.2)
SODIUM SERPL-SCNC: 141 MMOL/L (ref 136–145)
WBC # BLD AUTO: 8.7 X10*3/UL (ref 4.4–11.3)

## 2025-08-16 PROCEDURE — 85025 COMPLETE CBC W/AUTO DIFF WBC: CPT | Performed by: EMERGENCY MEDICINE

## 2025-08-16 PROCEDURE — 36415 COLL VENOUS BLD VENIPUNCTURE: CPT | Performed by: EMERGENCY MEDICINE

## 2025-08-16 PROCEDURE — 80048 BASIC METABOLIC PNL TOTAL CA: CPT | Performed by: EMERGENCY MEDICINE

## 2025-08-16 PROCEDURE — 96374 THER/PROPH/DIAG INJ IV PUSH: CPT

## 2025-08-16 PROCEDURE — 85652 RBC SED RATE AUTOMATED: CPT | Performed by: EMERGENCY MEDICINE

## 2025-08-16 PROCEDURE — 2500000001 HC RX 250 WO HCPCS SELF ADMINISTERED DRUGS (ALT 637 FOR MEDICARE OP): Performed by: EMERGENCY MEDICINE

## 2025-08-16 PROCEDURE — 99284 EMERGENCY DEPT VISIT MOD MDM: CPT | Mod: 25 | Performed by: EMERGENCY MEDICINE

## 2025-08-16 PROCEDURE — 2500000004 HC RX 250 GENERAL PHARMACY W/ HCPCS (ALT 636 FOR OP/ED): Performed by: EMERGENCY MEDICINE

## 2025-08-16 RX ORDER — METOCLOPRAMIDE HYDROCHLORIDE 5 MG/ML
10 INJECTION INTRAMUSCULAR; INTRAVENOUS ONCE
Status: COMPLETED | OUTPATIENT
Start: 2025-08-16 | End: 2025-08-16

## 2025-08-16 RX ORDER — BUTALBITAL, ACETAMINOPHEN AND CAFFEINE 50; 325; 40 MG/1; MG/1; MG/1
1 TABLET ORAL EVERY 4 HOURS PRN
Qty: 15 TABLET | Refills: 0 | Status: SHIPPED | OUTPATIENT
Start: 2025-08-16

## 2025-08-16 RX ORDER — DIPHENHYDRAMINE HCL 25 MG
25 CAPSULE ORAL ONCE
Status: COMPLETED | OUTPATIENT
Start: 2025-08-16 | End: 2025-08-16

## 2025-08-16 RX ADMIN — METOCLOPRAMIDE 10 MG: 5 INJECTION, SOLUTION INTRAMUSCULAR; INTRAVENOUS at 08:49

## 2025-08-16 RX ADMIN — DIPHENHYDRAMINE HYDROCHLORIDE 25 MG: 25 CAPSULE ORAL at 08:48

## 2025-08-16 ASSESSMENT — PAIN SCALES - GENERAL: PAINLEVEL_OUTOF10: 8

## 2025-08-16 ASSESSMENT — PAIN - FUNCTIONAL ASSESSMENT: PAIN_FUNCTIONAL_ASSESSMENT: 0-10

## 2025-08-18 ENCOUNTER — HOSPITAL ENCOUNTER (OUTPATIENT)
Dept: CARDIOLOGY | Facility: CLINIC | Age: 85
Discharge: HOME | End: 2025-08-18
Payer: MEDICARE

## 2025-08-18 DIAGNOSIS — Z95.0 PRESENCE OF CARDIAC PACEMAKER: ICD-10-CM

## 2025-08-18 DIAGNOSIS — I44.2 ATRIOVENTRICULAR BLOCK, COMPLETE (MULTI): ICD-10-CM

## 2025-08-22 ENCOUNTER — APPOINTMENT (OUTPATIENT)
Dept: RADIOLOGY | Facility: HOSPITAL | Age: 85
End: 2025-08-22
Payer: MEDICARE

## 2025-08-22 ENCOUNTER — HOSPITAL ENCOUNTER (EMERGENCY)
Facility: HOSPITAL | Age: 85
Discharge: HOSPICE | End: 2025-08-22
Attending: STUDENT IN AN ORGANIZED HEALTH CARE EDUCATION/TRAINING PROGRAM
Payer: MEDICARE

## 2025-08-22 ENCOUNTER — APPOINTMENT (OUTPATIENT)
Dept: CARDIOLOGY | Facility: HOSPITAL | Age: 85
End: 2025-08-22
Payer: MEDICARE

## 2025-08-22 ENCOUNTER — HOSPITAL ENCOUNTER (EMERGENCY)
Facility: HOSPITAL | Age: 85
Discharge: HOME | End: 2025-08-22
Attending: STUDENT IN AN ORGANIZED HEALTH CARE EDUCATION/TRAINING PROGRAM
Payer: MEDICARE

## 2025-08-22 VITALS
RESPIRATION RATE: 18 BRPM | WEIGHT: 190 LBS | TEMPERATURE: 97.3 F | DIASTOLIC BLOOD PRESSURE: 64 MMHG | BODY MASS INDEX: 37.3 KG/M2 | SYSTOLIC BLOOD PRESSURE: 156 MMHG | HEIGHT: 60 IN | HEART RATE: 79 BPM | OXYGEN SATURATION: 94 %

## 2025-08-22 VITALS
RESPIRATION RATE: 19 BRPM | DIASTOLIC BLOOD PRESSURE: 75 MMHG | BODY MASS INDEX: 37.3 KG/M2 | TEMPERATURE: 97.7 F | HEART RATE: 70 BPM | WEIGHT: 190 LBS | OXYGEN SATURATION: 96 % | SYSTOLIC BLOOD PRESSURE: 180 MMHG | HEIGHT: 60 IN

## 2025-08-22 DIAGNOSIS — R51.9 ACUTE NONINTRACTABLE HEADACHE, UNSPECIFIED HEADACHE TYPE: Primary | ICD-10-CM

## 2025-08-22 DIAGNOSIS — R70.0 ELEVATED ERYTHROCYTE SEDIMENTATION RATE: ICD-10-CM

## 2025-08-22 DIAGNOSIS — R51.9 ACUTE INTRACTABLE HEADACHE, UNSPECIFIED HEADACHE TYPE: Primary | ICD-10-CM

## 2025-08-22 LAB
ANION GAP SERPL CALC-SCNC: 16 MMOL/L (ref 10–20)
ATRIAL RATE: 70 BPM
BASOPHILS # BLD AUTO: 0.03 X10*3/UL (ref 0–0.1)
BASOPHILS NFR BLD AUTO: 0.3 %
BUN SERPL-MCNC: 17 MG/DL (ref 6–23)
CALCIUM SERPL-MCNC: 8.9 MG/DL (ref 8.6–10.3)
CHLORIDE SERPL-SCNC: 106 MMOL/L (ref 98–107)
CO2 SERPL-SCNC: 23 MMOL/L (ref 21–32)
CREAT SERPL-MCNC: 0.88 MG/DL (ref 0.5–1.05)
CRP SERPL-MCNC: 5.72 MG/DL
EGFRCR SERPLBLD CKD-EPI 2021: 64 ML/MIN/1.73M*2
EOSINOPHIL # BLD AUTO: 0.19 X10*3/UL (ref 0–0.4)
EOSINOPHIL NFR BLD AUTO: 1.9 %
ERYTHROCYTE [DISTWIDTH] IN BLOOD BY AUTOMATED COUNT: 14.4 % (ref 11.5–14.5)
ERYTHROCYTE [SEDIMENTATION RATE] IN BLOOD BY WESTERGREN METHOD: 60 MM/H (ref 0–30)
GLUCOSE SERPL-MCNC: 105 MG/DL (ref 74–99)
HCT VFR BLD AUTO: 35 % (ref 36–46)
HGB BLD-MCNC: 11.2 G/DL (ref 12–16)
IMM GRANULOCYTES # BLD AUTO: 0.05 X10*3/UL (ref 0–0.5)
IMM GRANULOCYTES NFR BLD AUTO: 0.5 % (ref 0–0.9)
LYMPHOCYTES # BLD AUTO: 2.93 X10*3/UL (ref 0.8–3)
LYMPHOCYTES NFR BLD AUTO: 28.8 %
MCH RBC QN AUTO: 29.9 PG (ref 26–34)
MCHC RBC AUTO-ENTMCNC: 32 G/DL (ref 32–36)
MCV RBC AUTO: 94 FL (ref 80–100)
MONOCYTES # BLD AUTO: 0.9 X10*3/UL (ref 0.05–0.8)
MONOCYTES NFR BLD AUTO: 8.8 %
NEUTROPHILS # BLD AUTO: 6.07 X10*3/UL (ref 1.6–5.5)
NEUTROPHILS NFR BLD AUTO: 59.7 %
NRBC BLD-RTO: 0 /100 WBCS (ref 0–0)
P OFFSET: 182 MS
P ONSET: 155 MS
PLATELET # BLD AUTO: 289 X10*3/UL (ref 150–450)
POTASSIUM SERPL-SCNC: 3.8 MMOL/L (ref 3.5–5.3)
PR INTERVAL: 162 MS
Q ONSET: 216 MS
QRS COUNT: 12 BEATS
QRS DURATION: 134 MS
QT INTERVAL: 404 MS
QTC CALCULATION(BAZETT): 436 MS
QTC FREDERICIA: 425 MS
R AXIS: -25 DEGREES
RBC # BLD AUTO: 3.74 X10*6/UL (ref 4–5.2)
SODIUM SERPL-SCNC: 141 MMOL/L (ref 136–145)
T AXIS: 128 DEGREES
T OFFSET: 418 MS
VENTRICULAR RATE: 70 BPM
WBC # BLD AUTO: 10.2 X10*3/UL (ref 4.4–11.3)

## 2025-08-22 PROCEDURE — 86140 C-REACTIVE PROTEIN: CPT | Performed by: STUDENT IN AN ORGANIZED HEALTH CARE EDUCATION/TRAINING PROGRAM

## 2025-08-22 PROCEDURE — 70496 CT ANGIOGRAPHY HEAD: CPT | Performed by: RADIOLOGY

## 2025-08-22 PROCEDURE — 85025 COMPLETE CBC W/AUTO DIFF WBC: CPT | Performed by: STUDENT IN AN ORGANIZED HEALTH CARE EDUCATION/TRAINING PROGRAM

## 2025-08-22 PROCEDURE — 70498 CT ANGIOGRAPHY NECK: CPT

## 2025-08-22 PROCEDURE — 85652 RBC SED RATE AUTOMATED: CPT | Performed by: STUDENT IN AN ORGANIZED HEALTH CARE EDUCATION/TRAINING PROGRAM

## 2025-08-22 PROCEDURE — 96367 TX/PROPH/DG ADDL SEQ IV INF: CPT

## 2025-08-22 PROCEDURE — 82374 ASSAY BLOOD CARBON DIOXIDE: CPT | Performed by: STUDENT IN AN ORGANIZED HEALTH CARE EDUCATION/TRAINING PROGRAM

## 2025-08-22 PROCEDURE — 2500000004 HC RX 250 GENERAL PHARMACY W/ HCPCS (ALT 636 FOR OP/ED): Performed by: STUDENT IN AN ORGANIZED HEALTH CARE EDUCATION/TRAINING PROGRAM

## 2025-08-22 PROCEDURE — 99284 EMERGENCY DEPT VISIT MOD MDM: CPT | Performed by: STUDENT IN AN ORGANIZED HEALTH CARE EDUCATION/TRAINING PROGRAM

## 2025-08-22 PROCEDURE — 93005 ELECTROCARDIOGRAM TRACING: CPT

## 2025-08-22 PROCEDURE — 96365 THER/PROPH/DIAG IV INF INIT: CPT | Mod: 59

## 2025-08-22 PROCEDURE — 96366 THER/PROPH/DIAG IV INF ADDON: CPT

## 2025-08-22 PROCEDURE — 70498 CT ANGIOGRAPHY NECK: CPT | Performed by: RADIOLOGY

## 2025-08-22 PROCEDURE — 2550000001 HC RX 255 CONTRASTS: Performed by: STUDENT IN AN ORGANIZED HEALTH CARE EDUCATION/TRAINING PROGRAM

## 2025-08-22 PROCEDURE — 36415 COLL VENOUS BLD VENIPUNCTURE: CPT | Performed by: STUDENT IN AN ORGANIZED HEALTH CARE EDUCATION/TRAINING PROGRAM

## 2025-08-22 PROCEDURE — 99285 EMERGENCY DEPT VISIT HI MDM: CPT | Mod: 25 | Performed by: STUDENT IN AN ORGANIZED HEALTH CARE EDUCATION/TRAINING PROGRAM

## 2025-08-22 PROCEDURE — 2500000001 HC RX 250 WO HCPCS SELF ADMINISTERED DRUGS (ALT 637 FOR MEDICARE OP): Performed by: STUDENT IN AN ORGANIZED HEALTH CARE EDUCATION/TRAINING PROGRAM

## 2025-08-22 PROCEDURE — 96375 TX/PRO/DX INJ NEW DRUG ADDON: CPT | Mod: 59

## 2025-08-22 RX ORDER — PREDNISONE 20 MG/1
80 TABLET ORAL DAILY
Qty: 240 TABLET | Refills: 0 | Status: SHIPPED | OUTPATIENT
Start: 2025-08-22 | End: 2025-08-25 | Stop reason: SDUPTHER

## 2025-08-22 RX ORDER — PNV NO.95/FERROUS FUM/FOLIC AC 28MG-0.8MG
1 TABLET ORAL DAILY
Qty: 30 TABLET | Refills: 11 | Status: SHIPPED | OUTPATIENT
Start: 2025-08-22 | End: 2025-08-22

## 2025-08-22 RX ORDER — PREDNISONE 20 MG/1
80 TABLET ORAL DAILY
Qty: 112 TABLET | Refills: 0 | Status: SHIPPED | OUTPATIENT
Start: 2025-08-22 | End: 2025-08-22

## 2025-08-22 RX ORDER — PNV NO.95/FERROUS FUM/FOLIC AC 28MG-0.8MG
1 TABLET ORAL DAILY
Qty: 30 TABLET | Refills: 0 | Status: SHIPPED | OUTPATIENT
Start: 2025-08-22 | End: 2025-08-25 | Stop reason: SDUPTHER

## 2025-08-22 RX ORDER — PROCHLORPERAZINE EDISYLATE 5 MG/ML
5 INJECTION INTRAMUSCULAR; INTRAVENOUS ONCE
Status: COMPLETED | OUTPATIENT
Start: 2025-08-22 | End: 2025-08-22

## 2025-08-22 RX ORDER — METOCLOPRAMIDE HYDROCHLORIDE 5 MG/ML
10 INJECTION INTRAMUSCULAR; INTRAVENOUS ONCE
Status: COMPLETED | OUTPATIENT
Start: 2025-08-22 | End: 2025-08-22

## 2025-08-22 RX ORDER — OMEPRAZOLE 20 MG/1
20 CAPSULE, DELAYED RELEASE ORAL DAILY
Qty: 30 CAPSULE | Refills: 0 | Status: SHIPPED | OUTPATIENT
Start: 2025-08-22 | End: 2025-08-25 | Stop reason: SDUPTHER

## 2025-08-22 RX ORDER — KETOROLAC TROMETHAMINE 30 MG/ML
15 INJECTION, SOLUTION INTRAMUSCULAR; INTRAVENOUS ONCE
Status: COMPLETED | OUTPATIENT
Start: 2025-08-22 | End: 2025-08-22

## 2025-08-22 RX ORDER — ACETAMINOPHEN 325 MG/1
975 TABLET ORAL ONCE
Status: COMPLETED | OUTPATIENT
Start: 2025-08-22 | End: 2025-08-22

## 2025-08-22 RX ORDER — OMEPRAZOLE 20 MG/1
20 CAPSULE, DELAYED RELEASE ORAL DAILY
Qty: 30 CAPSULE | Refills: 0 | Status: SHIPPED | OUTPATIENT
Start: 2025-08-22 | End: 2025-08-22

## 2025-08-22 RX ORDER — MAGNESIUM SULFATE HEPTAHYDRATE 40 MG/ML
2 INJECTION, SOLUTION INTRAVENOUS ONCE
Status: COMPLETED | OUTPATIENT
Start: 2025-08-22 | End: 2025-08-22

## 2025-08-22 RX ADMIN — MAGNESIUM SULFATE HEPTAHYDRATE 2 G: 40 INJECTION, SOLUTION INTRAVENOUS at 06:35

## 2025-08-22 RX ADMIN — KETOROLAC TROMETHAMINE 15 MG: 30 INJECTION, SOLUTION INTRAMUSCULAR at 14:03

## 2025-08-22 RX ADMIN — METOCLOPRAMIDE 10 MG: 5 INJECTION, SOLUTION INTRAMUSCULAR; INTRAVENOUS at 06:36

## 2025-08-22 RX ADMIN — DEXTROSE 500 MG: 50 INJECTION, SOLUTION INTRAVENOUS at 12:45

## 2025-08-22 RX ADMIN — IOHEXOL 85 ML: 350 INJECTION, SOLUTION INTRAVENOUS at 11:16

## 2025-08-22 RX ADMIN — SODIUM CHLORIDE, SODIUM LACTATE, POTASSIUM CHLORIDE, AND CALCIUM CHLORIDE 500 ML: .6; .31; .03; .02 INJECTION, SOLUTION INTRAVENOUS at 06:36

## 2025-08-22 RX ADMIN — ACETAMINOPHEN 975 MG: 325 TABLET ORAL at 06:35

## 2025-08-22 RX ADMIN — PROCHLORPERAZINE EDISYLATE 5 MG: 5 INJECTION INTRAMUSCULAR; INTRAVENOUS at 14:04

## 2025-08-22 ASSESSMENT — PAIN SCALES - GENERAL
PAINLEVEL_OUTOF10: 3
PAINLEVEL_OUTOF10: 6
PAINLEVEL_OUTOF10: 10 - WORST POSSIBLE PAIN
PAINLEVEL_OUTOF10: 0 - NO PAIN

## 2025-08-22 ASSESSMENT — PAIN - FUNCTIONAL ASSESSMENT
PAIN_FUNCTIONAL_ASSESSMENT: 0-10

## 2025-08-22 ASSESSMENT — PAIN DESCRIPTION - DESCRIPTORS: DESCRIPTORS: HEADACHE

## 2025-08-22 ASSESSMENT — PAIN DESCRIPTION - FREQUENCY
FREQUENCY: CONSTANT/CONTINUOUS
FREQUENCY: CONSTANT/CONTINUOUS

## 2025-08-22 ASSESSMENT — PAIN DESCRIPTION - PROGRESSION: CLINICAL_PROGRESSION: GRADUALLY IMPROVING

## 2025-08-22 ASSESSMENT — PAIN DESCRIPTION - ONSET: ONSET: ONGOING

## 2025-08-22 ASSESSMENT — VISUAL ACUITY
OD: 20/20
OS: 20/20
OU: 20/20

## 2025-08-22 ASSESSMENT — PAIN DESCRIPTION - PAIN TYPE
TYPE: ACUTE PAIN
TYPE: ACUTE PAIN

## 2025-08-22 ASSESSMENT — PAIN DESCRIPTION - LOCATION
LOCATION: HEAD

## 2025-08-25 DIAGNOSIS — R51.9 ACUTE NONINTRACTABLE HEADACHE, UNSPECIFIED HEADACHE TYPE: ICD-10-CM

## 2025-08-25 DIAGNOSIS — M31.6 GCA (GIANT CELL ARTERITIS): Primary | ICD-10-CM

## 2025-08-25 RX ORDER — PREDNISONE 20 MG/1
80 TABLET ORAL DAILY
Qty: 240 TABLET | Refills: 0 | Status: SHIPPED | OUTPATIENT
Start: 2025-08-25 | End: 2025-08-26 | Stop reason: SDUPTHER

## 2025-08-25 RX ORDER — PNV NO.95/FERROUS FUM/FOLIC AC 28MG-0.8MG
1 TABLET ORAL DAILY
Qty: 360 TABLET | Refills: 0 | Status: SHIPPED | OUTPATIENT
Start: 2025-08-25 | End: 2025-08-26 | Stop reason: SDUPTHER

## 2025-08-25 RX ORDER — OMEPRAZOLE 20 MG/1
20 CAPSULE, DELAYED RELEASE ORAL DAILY
Qty: 30 CAPSULE | Refills: 0 | Status: SHIPPED | OUTPATIENT
Start: 2025-08-25 | End: 2025-08-26 | Stop reason: SDUPTHER

## 2025-08-26 ENCOUNTER — FOLLOW-UP (OUTPATIENT)
Dept: OPHTHALMOLOGY | Facility: CLINIC | Age: 85
End: 2025-08-26
Payer: MEDICARE

## 2025-08-26 ENCOUNTER — PREP FOR PROCEDURE (OUTPATIENT)
Dept: VASCULAR MEDICINE | Facility: CLINIC | Age: 85
End: 2025-08-26

## 2025-08-26 DIAGNOSIS — H53.40 UNSPECIFIED VISUAL FIELD DEFECTS: ICD-10-CM

## 2025-08-26 DIAGNOSIS — R51.9 ACUTE NONINTRACTABLE HEADACHE, UNSPECIFIED HEADACHE TYPE: ICD-10-CM

## 2025-08-26 DIAGNOSIS — M31.6 GCA (GIANT CELL ARTERITIS): Primary | ICD-10-CM

## 2025-08-26 DIAGNOSIS — M31.6 GIANT CELL ARTERITIS: Primary | ICD-10-CM

## 2025-08-26 PROCEDURE — 92133 CPTRZD OPH DX IMG PST SGM ON: CPT | Performed by: PSYCHIATRY & NEUROLOGY

## 2025-08-26 PROCEDURE — 92083 EXTENDED VISUAL FIELD XM: CPT | Performed by: PSYCHIATRY & NEUROLOGY

## 2025-08-26 PROCEDURE — 99205 OFFICE O/P NEW HI 60 MIN: CPT | Performed by: PSYCHIATRY & NEUROLOGY

## 2025-08-26 RX ORDER — OMEPRAZOLE 40 MG/1
40 CAPSULE, DELAYED RELEASE ORAL DAILY
Qty: 30 CAPSULE | Refills: 11 | Status: SHIPPED | OUTPATIENT
Start: 2025-08-26 | End: 2025-08-29 | Stop reason: HOSPADM

## 2025-08-26 RX ORDER — PREDNISONE 20 MG/1
80 TABLET ORAL DAILY
Qty: 120 TABLET | Refills: 2 | Status: SHIPPED | OUTPATIENT
Start: 2025-08-26 | End: 2025-11-24

## 2025-08-26 RX ORDER — PNV NO.95/FERROUS FUM/FOLIC AC 28MG-0.8MG
1 TABLET ORAL DAILY
Qty: 30 TABLET | Refills: 11 | Status: SHIPPED | OUTPATIENT
Start: 2025-08-26 | End: 2025-08-29 | Stop reason: HOSPADM

## 2025-08-26 ASSESSMENT — ENCOUNTER SYMPTOMS
HEMATOLOGIC/LYMPHATIC NEGATIVE: 0
RESPIRATORY NEGATIVE: 0
NEUROLOGICAL NEGATIVE: 0
CONSTITUTIONAL NEGATIVE: 0
MUSCULOSKELETAL NEGATIVE: 0
CARDIOVASCULAR NEGATIVE: 0
EYES NEGATIVE: 1
GASTROINTESTINAL NEGATIVE: 0
ENDOCRINE NEGATIVE: 0
PSYCHIATRIC NEGATIVE: 0
ALLERGIC/IMMUNOLOGIC NEGATIVE: 0

## 2025-08-26 ASSESSMENT — TONOMETRY
OD_IOP_MMHG: 17
IOP_METHOD: TONOPEN APPLANATION
OS_IOP_MMHG: 15

## 2025-08-26 ASSESSMENT — CUP TO DISC RATIO
OD_RATIO: 0.4
OS_RATIO: 0.4

## 2025-08-26 ASSESSMENT — CONF VISUAL FIELD
OS_SUPERIOR_NASAL_RESTRICTION: 0
OD_INFERIOR_TEMPORAL_RESTRICTION: 0
OS_INFERIOR_TEMPORAL_RESTRICTION: 0
OD_INFERIOR_NASAL_RESTRICTION: 0
OS_INFERIOR_NASAL_RESTRICTION: 0
METHOD: COUNTING FINGERS
OS_NORMAL: 1
OD_SUPERIOR_NASAL_RESTRICTION: 0
OD_NORMAL: 1
OS_SUPERIOR_TEMPORAL_RESTRICTION: 0
OD_SUPERIOR_TEMPORAL_RESTRICTION: 0

## 2025-08-26 ASSESSMENT — VISUAL ACUITY
OS_PH_CC: 20/25
CORRECTION_TYPE: GLASSES
OS_CC: 20/25-2
METHOD: SNELLEN - LINEAR
OD_CC: 20/30+2

## 2025-08-26 ASSESSMENT — REFRACTION_WEARINGRX
OS_SPHERE: +2.00
OD_ADD: +2.75
OD_SPHERE: -0.50
OD_CYLINDER: -2.00
OS_AXIS: 093
OS_ADD: +2.75
OD_AXIS: 122
OS_CYLINDER: -3.50

## 2025-08-26 ASSESSMENT — SLIT LAMP EXAM - LIDS
COMMENTS: NORMAL
COMMENTS: NORMAL

## 2025-08-26 ASSESSMENT — EXTERNAL EXAM - RIGHT EYE: OD_EXAM: NORMAL

## 2025-08-26 ASSESSMENT — EXTERNAL EXAM - LEFT EYE: OS_EXAM: NORMAL

## 2025-08-27 ENCOUNTER — PATIENT MESSAGE (OUTPATIENT)
Dept: OPHTHALMOLOGY | Facility: CLINIC | Age: 85
End: 2025-08-27
Payer: MEDICARE

## 2025-08-27 ENCOUNTER — TELEPHONE (OUTPATIENT)
Dept: PRIMARY CARE | Facility: CLINIC | Age: 85
End: 2025-08-27
Payer: MEDICARE

## 2025-08-27 DIAGNOSIS — R51.9 ACUTE NONINTRACTABLE HEADACHE, UNSPECIFIED HEADACHE TYPE: ICD-10-CM

## 2025-08-27 RX ORDER — PREDNISONE 20 MG/1
80 TABLET ORAL DAILY
Qty: 120 TABLET | Refills: 2 | OUTPATIENT
Start: 2025-08-27 | End: 2025-11-25

## 2025-08-28 ENCOUNTER — ANESTHESIA EVENT (OUTPATIENT)
Dept: OPERATING ROOM | Facility: HOSPITAL | Age: 85
End: 2025-08-28
Payer: MEDICARE

## 2025-08-29 ENCOUNTER — ANESTHESIA (OUTPATIENT)
Dept: OPERATING ROOM | Facility: HOSPITAL | Age: 85
End: 2025-08-29
Payer: MEDICARE

## 2025-08-29 ENCOUNTER — HOSPITAL ENCOUNTER (OUTPATIENT)
Facility: HOSPITAL | Age: 85
Setting detail: OUTPATIENT SURGERY
Discharge: HOME | End: 2025-08-29
Attending: SURGERY | Admitting: SURGERY
Payer: MEDICARE

## 2025-08-29 PROCEDURE — 2500000004 HC RX 250 GENERAL PHARMACY W/ HCPCS (ALT 636 FOR OP/ED)

## 2025-08-29 PROCEDURE — 2500000004 HC RX 250 GENERAL PHARMACY W/ HCPCS (ALT 636 FOR OP/ED): Performed by: NURSE ANESTHETIST, CERTIFIED REGISTERED

## 2025-08-29 RX ORDER — HYDRALAZINE HYDROCHLORIDE 20 MG/ML
INJECTION INTRAMUSCULAR; INTRAVENOUS AS NEEDED
Status: DISCONTINUED | OUTPATIENT
Start: 2025-08-29 | End: 2025-08-29

## 2025-08-29 RX ORDER — MIDAZOLAM HYDROCHLORIDE 2 MG/2ML
INJECTION, SOLUTION INTRAMUSCULAR; INTRAVENOUS AS NEEDED
Status: DISCONTINUED | OUTPATIENT
Start: 2025-08-29 | End: 2025-08-29

## 2025-08-29 RX ORDER — ONDANSETRON HYDROCHLORIDE 2 MG/ML
INJECTION, SOLUTION INTRAVENOUS AS NEEDED
Status: DISCONTINUED | OUTPATIENT
Start: 2025-08-29 | End: 2025-08-29

## 2025-08-29 RX ORDER — LABETALOL HYDROCHLORIDE 5 MG/ML
INJECTION, SOLUTION INTRAVENOUS AS NEEDED
Status: DISCONTINUED | OUTPATIENT
Start: 2025-08-29 | End: 2025-08-29

## 2025-08-29 RX ORDER — FENTANYL CITRATE 50 UG/ML
INJECTION, SOLUTION INTRAMUSCULAR; INTRAVENOUS AS NEEDED
Status: DISCONTINUED | OUTPATIENT
Start: 2025-08-29 | End: 2025-08-29

## 2025-08-29 RX ORDER — CEFAZOLIN 1 G/1
INJECTION, POWDER, FOR SOLUTION INTRAVENOUS AS NEEDED
Status: DISCONTINUED | OUTPATIENT
Start: 2025-08-29 | End: 2025-08-29

## 2025-08-29 RX ADMIN — MIDAZOLAM HYDROCHLORIDE 1 MG: 2 INJECTION, SOLUTION INTRAMUSCULAR; INTRAVENOUS at 13:59

## 2025-08-29 RX ADMIN — LABETALOL HYDROCHLORIDE 5 MG: 5 INJECTION, SOLUTION INTRAVENOUS at 14:22

## 2025-08-29 RX ADMIN — SODIUM CHLORIDE, SODIUM LACTATE, POTASSIUM CHLORIDE, AND CALCIUM CHLORIDE: 600; 310; 30; 20 INJECTION, SOLUTION INTRAVENOUS at 13:47

## 2025-08-29 RX ADMIN — ONDANSETRON 4 MG: 2 INJECTION INTRAMUSCULAR; INTRAVENOUS at 15:02

## 2025-08-29 RX ADMIN — LABETALOL HYDROCHLORIDE 2.5 MG: 5 INJECTION, SOLUTION INTRAVENOUS at 14:00

## 2025-08-29 RX ADMIN — CEFAZOLIN 2 G: 1 INJECTION, POWDER, FOR SOLUTION INTRAMUSCULAR; INTRAVENOUS at 13:58

## 2025-08-29 RX ADMIN — LABETALOL HYDROCHLORIDE 5 MG: 5 INJECTION, SOLUTION INTRAVENOUS at 14:40

## 2025-08-29 RX ADMIN — FENTANYL CITRATE 50 MCG: 50 INJECTION, SOLUTION INTRAMUSCULAR; INTRAVENOUS at 13:56

## 2025-08-29 RX ADMIN — HYDRALAZINE HYDROCHLORIDE 4 MG: 20 INJECTION INTRAMUSCULAR; INTRAVENOUS at 14:53

## 2025-08-29 RX ADMIN — MIDAZOLAM HYDROCHLORIDE 1 MG: 2 INJECTION, SOLUTION INTRAMUSCULAR; INTRAVENOUS at 13:47

## 2025-08-29 SDOH — HEALTH STABILITY: MENTAL HEALTH: CURRENT SMOKER: 0

## 2025-08-29 ASSESSMENT — PAIN SCALES - GENERAL
PAINLEVEL_OUTOF10: 0 - NO PAIN
PAIN_LEVEL: 4
PAINLEVEL_OUTOF10: 0 - NO PAIN
PAINLEVEL_OUTOF10: 0 - NO PAIN
PAINLEVEL_OUTOF10: 5 - MODERATE PAIN

## 2025-08-29 ASSESSMENT — PAIN - FUNCTIONAL ASSESSMENT
PAIN_FUNCTIONAL_ASSESSMENT: 0-10

## 2025-09-02 ENCOUNTER — APPOINTMENT (OUTPATIENT)
Dept: NEUROLOGY | Facility: CLINIC | Age: 85
End: 2025-09-02
Payer: MEDICARE

## 2025-09-03 ENCOUNTER — APPOINTMENT (OUTPATIENT)
Dept: PHARMACY | Facility: HOSPITAL | Age: 85
End: 2025-09-03
Payer: MEDICARE

## 2025-09-11 ENCOUNTER — APPOINTMENT (OUTPATIENT)
Dept: NEPHROLOGY | Facility: CLINIC | Age: 85
End: 2025-09-11
Payer: MEDICARE

## 2025-09-18 ENCOUNTER — APPOINTMENT (OUTPATIENT)
Dept: OPHTHALMOLOGY | Facility: CLINIC | Age: 85
End: 2025-09-18
Payer: MEDICARE

## 2025-09-29 ENCOUNTER — APPOINTMENT (OUTPATIENT)
Dept: INFUSION THERAPY | Facility: CLINIC | Age: 85
End: 2025-09-29
Payer: MEDICARE

## 2025-10-15 ENCOUNTER — APPOINTMENT (OUTPATIENT)
Dept: ORTHOPEDIC SURGERY | Facility: CLINIC | Age: 85
End: 2025-10-15
Payer: MEDICARE

## 2025-10-22 ENCOUNTER — APPOINTMENT (OUTPATIENT)
Dept: SLEEP MEDICINE | Facility: CLINIC | Age: 85
End: 2025-10-22
Payer: MEDICARE

## 2025-11-18 ENCOUNTER — APPOINTMENT (OUTPATIENT)
Dept: SLEEP MEDICINE | Facility: HOSPITAL | Age: 85
End: 2025-11-18
Payer: MEDICARE

## 2025-11-21 ENCOUNTER — APPOINTMENT (OUTPATIENT)
Dept: UROLOGY | Facility: CLINIC | Age: 85
End: 2025-11-21
Payer: MEDICARE

## 2025-11-25 ENCOUNTER — APPOINTMENT (OUTPATIENT)
Dept: CARDIOLOGY | Facility: CLINIC | Age: 85
End: 2025-11-25
Payer: MEDICARE

## 2025-12-10 ENCOUNTER — APPOINTMENT (OUTPATIENT)
Dept: PHARMACY | Facility: HOSPITAL | Age: 85
End: 2025-12-10
Payer: MEDICARE

## 2026-01-20 ENCOUNTER — APPOINTMENT (OUTPATIENT)
Dept: NEPHROLOGY | Facility: CLINIC | Age: 86
End: 2026-01-20
Payer: MEDICARE

## 2026-02-12 ENCOUNTER — APPOINTMENT (OUTPATIENT)
Dept: CARDIOLOGY | Facility: CLINIC | Age: 86
End: 2026-02-12
Payer: MEDICARE